# Patient Record
Sex: MALE | Race: ASIAN | NOT HISPANIC OR LATINO | Employment: OTHER | URBAN - METROPOLITAN AREA
[De-identification: names, ages, dates, MRNs, and addresses within clinical notes are randomized per-mention and may not be internally consistent; named-entity substitution may affect disease eponyms.]

---

## 2017-02-23 ENCOUNTER — SLEEP CENTER VISIT (OUTPATIENT)
Dept: SLEEP MEDICINE | Facility: MEDICAL CENTER | Age: 67
End: 2017-02-23
Payer: COMMERCIAL

## 2017-02-23 VITALS
WEIGHT: 175 LBS | RESPIRATION RATE: 16 BRPM | HEART RATE: 71 BPM | OXYGEN SATURATION: 91 % | HEIGHT: 69 IN | DIASTOLIC BLOOD PRESSURE: 82 MMHG | BODY MASS INDEX: 25.92 KG/M2 | SYSTOLIC BLOOD PRESSURE: 126 MMHG | TEMPERATURE: 97.7 F

## 2017-02-23 DIAGNOSIS — G47.33 OSA (OBSTRUCTIVE SLEEP APNEA): ICD-10-CM

## 2017-02-23 DIAGNOSIS — R06.02 SHORTNESS OF BREATH: ICD-10-CM

## 2017-02-23 PROCEDURE — 99213 OFFICE O/P EST LOW 20 MIN: CPT | Performed by: NURSE PRACTITIONER

## 2017-02-23 NOTE — MR AVS SNAPSHOT
"        Jamal Dee   2017 10:40 AM   Sleep Center Visit   MRN: [id number]    Department:  [facility]   Dept Phone:  [phone]    Description:  Male : 1950   Provider:  ERIK Devlin           Reason for Visit     Apnea           Allergies as of 2017     No Known Allergies      You were diagnosed with     DELMA (obstructive sleep apnea)   [966856]       Shortness of breath   [786.05.ICD-9-CM]         Vital Signs     Blood Pressure Pulse Temperature Respirations Height Weight    126/82 mmHg 71 36.5 °C (97.7 °F) 16 1.753 m (5' 9\") 79.379 kg (175 lb)    Body Mass Index Oxygen Saturation Smoking Status             25.83 kg/m2 91% Former Smoker         Basic Information     Date Of Birth Sex Race Ethnicity Preferred Language    1950 Male  Non- English      Problem List              ICD-10-CM Priority Class Noted - Resolved    DELMA (obstructive sleep apnea) G47.33   2016 - Present    Shortness of breath R06.02   2017 - Present      Health Maintenance        Date Due Completion Dates    IMM DTaP/Tdap/Td Vaccine (1 - Tdap) 1968 ---    COLONOSCOPY 1999 ---    IMM ZOSTER VACCINE 2009 ---    IMM PNEUMOCOCCAL 65+ (ADULT) LOW/MEDIUM RISK SERIES (1 of 2 - PCV13) 2014 ---    IMM INFLUENZA (1) 2016 ---            Current Immunizations     No immunizations on file.      Below and/or attached are the medications your provider expects you to take. Review all of your home medications and newly ordered medications with your provider and/or pharmacist. Follow medication instructions as directed by your provider and/or pharmacist. Please keep your medication list with you and share with your provider. Update the information when medications are discontinued, doses are changed, or new medications (including over-the-counter products) are added; and carry medication information at all times in the event of emergency situations     Allergies:  " No Known Allergies          Medications  Valid as of: February 23, 2017 - 10:58 AM    Generic Name Brand Name Tablet Size Instructions for use    Atorvastatin Calcium   Take  by mouth.        Bimatoprost (Solution) LUMIGAN 0.01 % Place 1 Drop in both eyes every bedtime. 1 DROP IN EACH EYE AT BED TIME.        Chlorhexidine Gluconate (Solution) PERIDEX 0.12 % RINSE 15ML'S AS DIRECTED AS NEEDED OR AFTER BREAKFAST/BEFORE BEDTIME FOLLOWING BRUSHING AND FLOSSING        Dutasteride (Cap) AVODART 0.5 MG Take 0.5 mg by mouth.        Dutasteride-Tamsulosin HCl (Cap) Dutasteride-Tamsulosin HCl 0.5-0.4 MG Take  by mouth every day. 1 TABLET DAILY        Fluticasone Propionate (Suspension) FLONASE 50 MCG/ACT USE 1 TO 2 SPRAYS IN EACH NOSTRIL ONCE DAILY        Fluvastatin Sodium (TABLET SR 24 HR) LESCOL XL 80 MG Take  by mouth every day.        PredniSONE (Tab) DELTASONE 10 MG TAKE 1 TABLET BY MOUTH EVERY MORNING FOR 2 DAYS        Zolpidem Tartrate (Tab) AMBIEN 5 MG Take 5 mg by mouth at bedtime as needed for Sleep. 1 to 3 po qhs prn. BRING TO SLEEP STUDY        .                 Medicines prescribed today were sent to:     [facility]Y #[id number] - [place], [place] - [address] [place] [address]    Phone: [phone] Fax: [phone]    Open 24 Hours?: No      Medication refill instructions:       If your prescription bottle indicates you have medication refills left, it is not necessary to call your provider’s office. Please contact your pharmacy and they will refill your medication.    If your prescription bottle indicates you do not have any refills left, you may request refills at any time through one of the following ways: The online [employer]on system (except Urgent Care), by calling your provider’s office, or by asking your pharmacy to contact your provider’s office with a refill request. Medication refills are processed only during regular business hours and may not be available  until the next business day. Your provider may request additional information or to have a follow-up visit with you prior to refilling your medication.   *Please Note: Medication refills are assigned a new Rx number when refilled electronically. Your pharmacy may indicate that no refills were authorized even though a new prescription for the same medication is available at the pharmacy. Please request the medicine by name with the pharmacy before contacting your provider for a refill.           QuarterSpot Access Code: [email or web address]  Expires: 3/25/2017 10:58 AM    QuarterSpot  A secure, online tool to manage your health information     [employer]’s QuarterSpot® is a secure, online tool that connects you to your personalized health information from the privacy of your home -- day or night - making it very easy for you to manage your healthcare. Once the activation process is completed, you can even access your medical information using the QuarterSpot gagan, which is available for free in the Apple Gagan store or Google Play store.     QuarterSpot provides the following levels of access (as shown below):   My Chart Features   [facility] Doctor [facility]  Specialists [facility]  Urgent  Care Non-[facility]  Primary Care  Doctor   Email your healthcare team securely and privately 24/7 X X X    Manage appointments: schedule your next appointment; view details of past/upcoming appointments X      Request prescription refills. X      View recent personal medical records, including lab and immunizations X X X X   View health record, including health history, allergies, medications X X X X   Read reports about your outpatient visits, procedures, consult and ER notes X X X X   See your discharge summary, which is a recap of your hospital and/or ER visit that includes your diagnosis, lab results, and care plan. X X       How to register for QuarterSpot:  1. Go to  [email or web address].  2. Click on the Sign Up Now box, which takes you to the  New Member Sign Up page. You will need to provide the following information:  a. Enter your Cabify Access Code exactly as it appears at the top of this page. (You will not need to use this code after you’ve completed the sign-up process. If you do not sign up before the expiration date, you must request a new code.)   b. Enter your date of birth.   c. Enter your home email address.   d. Click Submit, and follow the next screen’s instructions.  3. Create a Cabify ID. This will be your Cabify login ID and cannot be changed, so think of one that is secure and easy to remember.  4. Create a Cabify password. You can change your password at any time.  5. Enter your Password Reset Question and Answer. This can be used at a later time if you forget your password.   6. Enter your e-mail address. This allows you to receive e-mail notifications when new information is available in Cabify.  7. Click Sign Up. You can now view your health information.    For assistance activating your Cabify account, call [phone]

## 2017-02-23 NOTE — PROGRESS NOTES
Chief Complaint   Patient presents with   • Apnea       HPI:  Jamal Dee is a 66 y.o. year old male here today for follow-up on his obstructive sleep apnea. He has a seven-year history of excessive snoring, witnessed apneas, and daytime sleepiness. He underwent a dedicated in lab Polysomnogram on 5/2/2016 which indicates evidence of severe obstructive sleep apnea with an AHI of 55.6 and a low 02 saturation of 68%. He was successfully titrated to CPAP at 10 CM H20 with a resultant AHI of 2.3 and a low 02 saturation of 90%. He was started on a CPAP of 10 CM H20. He felt the pressures were too high on that setting and he requested a decrease. Compliance card download on CPAP of 10 CM H20 indicated an AHI of 4.6 with an average use of over 7 hours at night. He was empirically decreased to 8 CM H20, but continued to have tolerance issues. He was decreased to 6 CM at his last office visit. Repeat compliance card download today in the office indicates an AHI of 6.9 with an average use of 7 hours at night.   He had complaints of dyspnea at his last office visit. An echocardiogram was ordered. He did not have this performed as he is feeling much better. He denies dyspnea during the day. He denies current cough, mucous or wheeze. He denies any fevers or chills. He is using the dream wear mask which he feels is more comfortable. He tolerates the new pressure better. He is sleeping better and waking more refreshed. He denies daytime fatigue. He denies any morning headache.      Past Medical History   Diagnosis Date   • Hyperlipidemia    • Glaucoma        Past Surgical History   Procedure Laterality Date   • Cataract phaco with iol  5/2/2013     Performed by Theodore Guzman M.D. at [facility] ORS   • Cataract phaco with iol  5/16/2013     Performed by Theodore Guzman M.D. at [facility]       Family History   Problem Relation Age of Onset   • Heart Attack Father        Social History     Social  History   • Marital Status:      Spouse Name: N/A   • Number of Children: N/A   • Years of Education: N/A     Occupational History   • Not on file.     Social History Main Topics   • Smoking status: Former Smoker -- 1.50 packs/day   • Smokeless tobacco: Not on file      Comment: quit 1985   • Alcohol Use: 0.0 oz/week     0 Standard drinks or equivalent per week      Comment: wine   • Drug Use: Not on file   • Sexual Activity: Not on file     Other Topics Concern   • Not on file     Social History Narrative         ROS:  Constitutional: Denies fevers, chills, sweats, fatigue, weight loss  Eyes: Denies glasses, vision loss, pain, drainage, double vision  Ears/Nose/Mouth/Throat: Denies rhinitis, nasal congestion, ear ache, difficulty hearing, sore throat, persistent hoarseness, decayed teeth/toothache  Cardiovascular: Denies chest pain, tightness, palpitations, swelling in feet/legs, fainting, difficulty breathing when laying down  Respiratory: See HPI   GI: Denies heartburn, difficulty swallowing, nausea, vomiting, abdominal pain, diarrhea, constipation  : Denies frequent urination, painful urination  Integumentary: Denies rashes, lumps or color changes  MSK: Denies painful joints, sore muscles, and back pain.   Neurological: Denies frequent headaches, dizziness, weakness  Sleep: See HPI       Current Outpatient Prescriptions on File Prior to Visit   Medication Sig Dispense Refill   • dutasteride (AVODART) 0.5 MG capsule Take 0.5 mg by mouth.  0   • fluticasone (FLONASE) 50 MCG/ACT nasal spray USE 1 TO 2 SPRAYS IN EACH NOSTRIL ONCE DAILY  0   • predniSONE (DELTASONE) 10 MG Tab TAKE 1 TABLET BY MOUTH EVERY MORNING FOR 2 DAYS  0   • chlorhexidine (PERIDEX) 0.12 % Solution RINSE 15ML'S AS DIRECTED AS NEEDED OR AFTER BREAKFAST/BEFORE BEDTIME FOLLOWING BRUSHING AND FLOSSING  0   • Atorvastatin Calcium (LIPITOR PO) Take  by mouth.     • zolpidem (AMBIEN) 5 MG Tab Take 5 mg by mouth at bedtime as needed for Sleep. 1  "to 3 po qhs prn. BRING TO SLEEP STUDY     • bimatoprost (LUMIGAN) 0.01 % Solution Place 1 Drop in both eyes every bedtime. 1 DROP IN EACH EYE AT BED TIME.     • fluvastatin XL (LESCOL XL) 80 MG SR tablet Take  by mouth every day.     • Dutasteride-Tamsulosin HCl 0.5-0.4 MG Cap Take  by mouth every day. 1 TABLET DAILY       No current facility-administered medications on file prior to visit.     Review of patient's allergies indicates no known allergies.    Blood pressure 126/82, pulse 71, temperature 36.5 °C (97.7 °F), resp. rate 16, height 1.753 m (5' 9\"), weight 79.379 kg (175 lb), SpO2 91 %.  PE:   Appearance: Well developed, well nourished, no acute distress  Eyes: PERRL, EOM intact, sclera white, conjunctiva moist  Ears: no lesions or deformities  Hearing: grossly intact  Nose: no lesions or deformities  Oropharynx: tongue normal, posterior pharynx without erythema or exudate  Mallampati Classification: class 4  Neck: supple, trachea midline, no masses   Respiratory effort: no intercostal retractions or use of accessory muscles  Lung auscultation: no rales, rhonchi or wheezes  Heart auscultation: no murmur rub or gallop  Extremities: no cyanosis or edema  Abdomen: soft ,non tender, no masses  Gait and Station: normal  Digits and nails: no clubbing, cyanosis, petechiae or nodes.  Cranial nerves: grossly intact  Skin: no rashes, lesions or ulcers noted  Orientation: Oriented to time, person and place  Mood and affect: mood and affect appropriate, normal interaction with examiner  Judgement: Intact          Assessment:  1. DELMA (obstructive sleep apnea)           Plan:    1) Continue CPAP of 6 CM H20. He is tolerating the new pressure much better. He is sleeping better and waking more refreshed and his shortness of breath has resolved. D/C order for Echo.   2) Sleep hygiene discussed.   3) Annual follow up, sooner if needed.   "

## 2017-02-23 NOTE — PATIENT INSTRUCTIONS
Plan:    1) Continue CPAP of 6 CM H20. He is tolerating the new pressure much better. He is sleeping better and waking more refreshed and his shortness of breath has resolved. D/C order for Echo.   2) Sleep hygiene discussed.   3) Annual follow up, sooner if needed.

## 2017-03-27 ENCOUNTER — TELEPHONE (OUTPATIENT)
Dept: MEDICAL GROUP | Age: 67
End: 2017-03-27

## 2017-03-27 RX ORDER — ATORVASTATIN CALCIUM 40 MG/1
40 TABLET, FILM COATED ORAL
Refills: 0 | COMMUNITY
Start: 2017-03-13 | End: 2017-04-04 | Stop reason: SDUPTHER

## 2017-03-27 NOTE — Clinical Note
[facility]  Merari Juarez M.D.  [address] [place] [address]  Fax: [phone]   Authorization for Release/Disclosure of   Protected Health Information   Name: AYO DEE : 1950 SSN: [id number]   Address: [address]  [place] [address] Phone:    [phone] (home)    I authorize the entity listed below to release/disclose the PHI below to:   [facility]/ Maya Gage M.D.   Provider or Entity Name:  [facility]   Address   City, State, [facility]   Phone:      Fax:  [phone]     Reason for request: continuity of care   Information to be released:    [XX  ] LAST COLONOSCOPY,  including any PATH REPORT and follow-up  [  ] LAST FIT/COLOGUARD RESULT [  ] LAST DEXA  [  ] LAST MAMMOGRAM  [  ] LAST PAP  [  ] LAST LABS [  ] RETINA EXAM REPORT  [  ] IMMUNIZATION RECORDS  [  ] Release all info      [  ] Check here and initial the line next to each item to release ALL health information INCLUDING  _____ Care and treatment for drug and / or alcohol abuse  _____ HIV testing, infection status, or AIDS  _____ Genetic Testing    DATES OF SERVICE OR TIME PERIOD TO BE DISCLOSED: _____________  I understand and acknowledge that:  * This Authorization may be revoked at any time by you in writing, except if your health information has already been used or disclosed.  * Your health information that will be used or disclosed as a result of you signing this authorization could be re-disclosed by the recipient. If this occurs, your re-disclosed health information may no longer be protected by State or Federal laws.  * You may refuse to sign this Authorization. Your refusal will not affect your ability to obtain treatment.  * This Authorization becomes effective upon signing and will  on (date) __________.      If no date is indicated, this Authorization will  one (1) year from the signature date.    Name: Ayo Dee    Signature:   Date:     3/28/2017       PLEASE FAX REQUESTED RECORDS BACK  TO: [phone]

## 2017-03-27 NOTE — TELEPHONE ENCOUNTER
Called Jamal Dee and left a message to return my call regarding his/her upcoming NP appointment with Maya Gage M.D..   If patient returns the call please transfer them to extension: [phone]

## 2017-03-28 NOTE — TELEPHONE ENCOUNTER
NEW PATIENT VISIT PRE-VISIT PLANNING    1.  [employer] Patient is checked in Patient Demographics? YES    2.  Immunizations were updated in [facility] using WebIZ?: Yes       •  Web Iz Recommendations: FLU HEPATITIS A  HEPATITIS B TDAP    3.  Patient is due for the following Health Maintenance Topics:   Health Maintenance Due   Topic Date Due   • IMM DTaP/Tdap/Td Vaccine (1 - Tdap) 09/30/1968   • COLONOSCOPY  09/30/1999   • IMM PNEUMOCOCCAL 65+ (ADULT) LOW/MEDIUM RISK SERIES (1 of 2 - PCV13) 09/30/2014   • IMM INFLUENZA (1) 09/01/2016       - Patient has completed Colonoscopy/FIT and HMR Letter faxed to:  [facility] faxed on 3/28/17.    4.  Reviewed/Updated the following with patient:       •   Preferred Pharmacy? YES       •   Preferred Lab? YES       •   Medications? YES. Was Abstract Encounter opened and chart updated? YES       •   Social History? YES. Was Abstract Encounter opened and chart updated? YES       •   Family History? YES. Was Abstract Encounter opened and chart updated? YES    5.  Updated Care Team?       •   DME Company (gait device, O2, CPAP, etc.) N\A       •   Other Specialists (eye doctor, derm, GYN, cardiology, endo, etc): YES    6.  Patient was informed to arrive 15 min prior to their scheduled appointment and bring in their medication bottles? YES

## 2017-04-04 ENCOUNTER — OFFICE VISIT (OUTPATIENT)
Dept: MEDICAL GROUP | Age: 67
End: 2017-04-04
Payer: MEDICARE

## 2017-04-04 VITALS
TEMPERATURE: 97.3 F | HEART RATE: 75 BPM | SYSTOLIC BLOOD PRESSURE: 120 MMHG | DIASTOLIC BLOOD PRESSURE: 72 MMHG | WEIGHT: 180 LBS | HEIGHT: 67 IN | RESPIRATION RATE: 16 BRPM | OXYGEN SATURATION: 94 % | BODY MASS INDEX: 28.25 KG/M2

## 2017-04-04 DIAGNOSIS — R73.03 PREDIABETES: ICD-10-CM

## 2017-04-04 DIAGNOSIS — R33.8 BPH (BENIGN PROSTATIC HYPERTROPHY) WITH URINARY RETENTION: ICD-10-CM

## 2017-04-04 DIAGNOSIS — Z11.59 NEED FOR HEPATITIS C SCREENING TEST: ICD-10-CM

## 2017-04-04 DIAGNOSIS — E78.2 MIXED HYPERLIPIDEMIA: ICD-10-CM

## 2017-04-04 DIAGNOSIS — Z12.5 SCREENING FOR PROSTATE CANCER: ICD-10-CM

## 2017-04-04 DIAGNOSIS — Z11.59 NEED FOR HEPATITIS B SCREENING TEST: ICD-10-CM

## 2017-04-04 DIAGNOSIS — G47.33 OSA (OBSTRUCTIVE SLEEP APNEA): ICD-10-CM

## 2017-04-04 DIAGNOSIS — N40.1 BPH (BENIGN PROSTATIC HYPERTROPHY) WITH URINARY RETENTION: ICD-10-CM

## 2017-04-04 DIAGNOSIS — Z01.84 IMMUNITY STATUS TESTING: ICD-10-CM

## 2017-04-04 DIAGNOSIS — R73.01 IFG (IMPAIRED FASTING GLUCOSE): ICD-10-CM

## 2017-04-04 PROBLEM — R06.02 SHORTNESS OF BREATH: Status: RESOLVED | Noted: 2017-02-23 | Resolved: 2017-04-04

## 2017-04-04 PROCEDURE — 99204 OFFICE O/P NEW MOD 45 MIN: CPT | Performed by: INTERNAL MEDICINE

## 2017-04-04 RX ORDER — TAMSULOSIN HYDROCHLORIDE 0.4 MG/1
0.4 CAPSULE ORAL
Qty: 90 CAP | Refills: 3 | Status: SHIPPED | OUTPATIENT
Start: 2017-04-04 | End: 2018-03-27 | Stop reason: SDUPTHER

## 2017-04-04 RX ORDER — ATORVASTATIN CALCIUM 40 MG/1
40 TABLET, FILM COATED ORAL EVERY EVENING
Qty: 90 TAB | Refills: 3 | Status: SHIPPED | OUTPATIENT
Start: 2017-04-04 | End: 2018-03-27 | Stop reason: SDUPTHER

## 2017-04-04 RX ORDER — DUTASTERIDE 0.5 MG/1
0.5 CAPSULE, LIQUID FILLED ORAL DAILY
Qty: 90 CAP | Refills: 3 | Status: SHIPPED | OUTPATIENT
Start: 2017-04-04 | End: 2018-03-27 | Stop reason: SDUPTHER

## 2017-04-04 ASSESSMENT — PATIENT HEALTH QUESTIONNAIRE - PHQ9: CLINICAL INTERPRETATION OF PHQ2 SCORE: 0

## 2017-04-04 ASSESSMENT — PAIN SCALES - GENERAL: PAINLEVEL: NO PAIN

## 2017-04-04 NOTE — ASSESSMENT & PLAN NOTE
Patient has prediabetes with A1c 5.9-6. He reported that he started eating brown rice instead of white rice. He cut down carbohydrate and sweet. He does not exercise regularly.

## 2017-04-04 NOTE — MR AVS SNAPSHOT
"        Jamal Dee   2017 8:40 AM   Office Visit   MRN: [id number]    Department:  [address] [place] [address]   Dept Phone:  [phone]    Description:  Male : 1950   Provider:  Maya Gage M.D.           Reason for Visit     Establish Care Rx, blood test results    Hyperlipidemia     Benign Prostatic Hypertrophy     Other sleep apnea      Allergies as of 2017     Allergen Noted Reactions    Other Environmental 2017   Unspecified    Goose down      You were diagnosed with     Mixed hyperlipidemia   [272.2.ICD-9-CM]       BPH (benign prostatic hypertrophy) with urinary retention   [757619]       DELMA (obstructive sleep apnea)   [803835]       Screening for prostate cancer   [969657]       Need for hepatitis B screening test   [9856984]       Need for hepatitis C screening test   [825670]       Immunity status testing   [443776]       Prediabetes   [273913]         Vital Signs     Blood Pressure Pulse Temperature Respirations Height Weight    120/72 mmHg 75 36.3 °C (97.3 °F) 16 1.702 m (5' 7.01\") 81.647 kg (180 lb)    Body Mass Index Oxygen Saturation Smoking Status             28.19 kg/m2 94% Former Smoker         Basic Information     Date Of Birth Sex Race Ethnicity Preferred Language    1950 Male  Non- English      Your appointments     May 31, 2017  8:40 AM   Established Patient with Maya Gage M.D.   [address] [id number] [phone]           You will be receiving a confirmation call a few days before your appointment from our automated call confirmation system.              Problem List              ICD-10-CM Priority Class Noted - Resolved    DELMA (obstructive sleep apnea) G47.33   2016 - Present    Mixed hyperlipidemia E78.2   2017 - Present    BPH (benign prostatic hypertrophy) with urinary retention N40.1, R33.8   2017 - Present    Prediabetes R73.03   2017 - Present      "   Health Maintenance        Date Due Completion Dates    IMM DTaP/Tdap/Td Vaccine (1 - Tdap) 9/30/1968 ---    IMM PNEUMOCOCCAL 65+ (ADULT) LOW/MEDIUM RISK SERIES (1 of 2 - PCV13) 9/30/2014 ---    COLONOSCOPY 11/11/2018 11/11/2013            Current Immunizations     SHINGLES VACCINE 1/12/2015      Below and/or attached are the medications your provider expects you to take. Review all of your home medications and newly ordered medications with your provider and/or pharmacist. Follow medication instructions as directed by your provider and/or pharmacist. Please keep your medication list with you and share with your provider. Update the information when medications are discontinued, doses are changed, or new medications (including over-the-counter products) are added; and carry medication information at all times in the event of emergency situations     Allergies:  OTHER ENVIRONMENTAL - Unspecified               Medications  Valid as of: April 04, 2017 -  9:29 AM    Generic Name Brand Name Tablet Size Instructions for use    Atorvastatin Calcium (Tab) LIPITOR 40 MG Take 1 Tab by mouth every evening.        Bimatoprost (Solution) LUMIGAN 0.01 % Place 1 Drop in both eyes every bedtime. 1 DROP IN EACH EYE AT BED TIME.        Dutasteride (Cap) AVODART 0.5 MG Take 1 Cap by mouth every day.        Fluticasone Propionate (Suspension) FLONASE 50 MCG/ACT USE 1 TO 2 SPRAYS IN EACH NOSTRIL ONCE DAILY        Tamsulosin HCl (Cap) FLOMAX 0.4 MG Take 1 Cap by mouth ONE-HALF HOUR AFTER BREAKFAST.        .                 Medicines prescribed today were sent to:     [id number] - [place] - [address] [facility]    [address] [place] [address] [place] [address]    Phone: [phone] Fax: [phone]    Open 24 Hours?: No      Medication refill instructions:       If your prescription bottle indicates you have medication refills left, it is not necessary to call your provider’s office. Please contact your pharmacy and they will refill your  medication.    If your prescription bottle indicates you do not have any refills left, you may request refills at any time through one of the following ways: The online eucl3D system (except Urgent Care), by calling your provider’s office, or by asking your pharmacy to contact your provider’s office with a refill request. Medication refills are processed only during regular business hours and may not be available until the next business day. Your provider may request additional information or to have a follow-up visit with you prior to refilling your medication.   *Please Note: Medication refills are assigned a new Rx number when refilled electronically. Your pharmacy may indicate that no refills were authorized even though a new prescription for the same medication is available at the pharmacy. Please request the medicine by name with the pharmacy before contacting your provider for a refill.        Your To Do List     Future Labs/Procedures Complete By Expires    CBC WITH DIFFERENTIAL  As directed 4/5/2018    COMP METABOLIC PANEL  As directed 4/5/2018    HEP B CORE AB TOTAL  As directed 4/4/2018    HEP B SURFACE AB  As directed 4/5/2018    HEP C VIRUS ANTIBODY  As directed 4/5/2018    HEPATITIS A AB IGM+TOTAL  As directed 4/5/2018    HEPATITIS B SURFACE ANTIGEN  As directed 4/5/2018    LIPID PROFILE  As directed 4/5/2018    PROSTATE SPECIFIC AG SCREENING  As directed 4/5/2018         [email or web address] Access Code: [email or web address]  Expires: 4/26/2017  2:29 PM    eucl3D  A secure, online tool to manage your health information     [employer]’s eucl3D® is a secure, online tool that connects you to your personalized health information from the privacy of your home -- day or night - making it very easy for you to manage your healthcare. Once the activation process is completed, you can even access your medical information using the eucl3D gagan, which is available for free in the Apple Gagan store or Google Play store.      [employer] provides the following levels of access (as shown below):   My Chart Features   [facility] Doctor [facility]  Specialists [facility]  Urgent  Care Non-[facility]  Primary Care  Doctor   Email your healthcare team securely and privately 24/7 X X X    Manage appointments: schedule your next appointment; view details of past/upcoming appointments X      Request prescription refills. X      View recent personal medical records, including lab and immunizations X X X X   View health record, including health history, allergies, medications X X X X   Read reports about your outpatient visits, procedures, consult and ER notes X X X X   See your discharge summary, which is a recap of your hospital and/or ER visit that includes your diagnosis, lab results, and care plan. X X       How to register for [employer]:  1. Go to  [email or web address].  2. Click on the Sign Up Now box, which takes you to the New Member Sign Up page. You will need to provide the following information:  a. Enter your CelePost Access Code exactly as it appears at the top of this page. (You will not need to use this code after you’ve completed the sign-up process. If you do not sign up before the expiration date, you must request a new code.)   b. Enter your date of birth.   c. Enter your home email address.   d. Click Submit, and follow the next screen’s instructions.  3. Create a CelePost ID. This will be your CelePost login ID and cannot be changed, so think of one that is secure and easy to remember.  4. Create a CelePost password. You can change your password at any time.  5. Enter your Password Reset Question and Answer. This can be used at a later time if you forget your password.   6. Enter your e-mail address. This allows you to receive e-mail notifications when new information is available in CelePost.  7. Click Sign Up. You can now view your health information.    For assistance activating your [employer]Post account, call [phone]

## 2017-04-04 NOTE — ASSESSMENT & PLAN NOTE
He was diagnosed with sleep apnea in 2016. He started using CPAP 8 months ago. He follows with sleep medicine. He stated that he tolerates machine after adjusting the pressure.

## 2017-04-04 NOTE — ASSESSMENT & PLAN NOTE
Patient is taking atorvastatin 40 mg every evening. He denies side effects from taking atorvastatin. His last lipid panel showed that his cholesterol is well controlled except triglyceride which remains high.

## 2017-04-04 NOTE — ASSESSMENT & PLAN NOTE
Patient is taking dutasteride 0.5 mg every evening. He reported that his nocturia improves with medication but he still has urinary retention and frequency of urination in the daytime. We discussed to try Flomax 0.4 mg every morning after breakfast. He agreed with the plan.

## 2017-04-04 NOTE — PROGRESS NOTES
Jamal Dee is a 66 y.o. male here to establish care and the evaluation and management of:      HPI:    BPH (benign prostatic hypertrophy) with urinary retention  Patient is taking dutasteride 0.5 mg every evening. He reported that his nocturia improves with medication but he still has urinary retention and frequency of urination in the daytime. We discussed to try Flomax 0.4 mg every morning after breakfast. He agreed with the plan.    Mixed hyperlipidemia  Patient is taking atorvastatin 40 mg every evening. He denies side effects from taking atorvastatin. His last lipid panel showed that his cholesterol is well controlled except triglyceride which remains high.     DELMA (obstructive sleep apnea)  He was diagnosed with sleep apnea in 2016. He started using CPAP 8 months ago. He follows with sleep medicine. He stated that he tolerates machine after adjusting the pressure.    Prediabetes  Patient has prediabetes with A1c 5.9-6. He reported that he started eating brown rice instead of white rice. He cut down carbohydrate and sweet. He does not exercise regularly.    Current medicines (including changes today)  Current Outpatient Prescriptions   Medication Sig Dispense Refill   • atorvastatin (LIPITOR) 40 MG Tab Take 1 Tab by mouth every evening. 90 Tab 3   • dutasteride (AVODART) 0.5 MG capsule Take 1 Cap by mouth every day. 90 Cap 3   • tamsulosin (FLOMAX) 0.4 MG capsule Take 1 Cap by mouth ONE-HALF HOUR AFTER BREAKFAST. 90 Cap 3   • fluticasone (FLONASE) 50 MCG/ACT nasal spray USE 1 TO 2 SPRAYS IN EACH NOSTRIL ONCE DAILY  0   • bimatoprost (LUMIGAN) 0.01 % Solution Place 1 Drop in both eyes every bedtime. 1 DROP IN EACH EYE AT BED TIME.       No current facility-administered medications for this visit.     He  has a past medical history of Hyperlipidemia and Glaucoma.  He  has past surgical history that includes cataract phaco with iol (5/2/2013) and cataract phaco with iol (5/16/2013).  Social History  "  Substance Use Topics   • Smoking status: Former Smoker -- 1.50 packs/day for 15 years     Types: Cigarettes     Quit date: 1984   • Smokeless tobacco: Never Used      Comment: quit    • Alcohol Use: 4.2 oz/week     7 Glasses of wine, 0 Standard drinks or equivalent per week      Comment: 1 glass a night     Social History     Social History Narrative     Family History   Problem Relation Age of Onset   • Heart Attack Father    • Cancer Sister 62     colon cancer     Family Status   Relation Status Death Age   • Father     • Mother  95   • Sister     • Maternal Grandmother     • Maternal Grandfather     • Paternal Grandmother     • Paternal Grandfather       Health Maintenance Topics with due status: Overdue       Topic Date Due    IMM DTaP/Tdap/Td Vaccine 1968    IMM PNEUMOCOCCAL 65+ (ADULT) LOW/MEDIUM RISK SERIES 2014         ROS    Gen.: Denied weight change, appetite change, fatigue.  ENT: Denied sinus tenderness, nasal congestion, runny nose, or sore throat  CVS: Denied chest pain, palpitations, legs swelling.  Respiratory: Denied cough, shortness of breath, wheezing.  GI: Denied abdominal pain, constipation or diarrhea.  Endocrine: Denied temperature intolerance, increased frequency of urination, polyphagia or polydipsia.  Musculoskeletal: Denied back pain or joint pain.    All other systems reviewed and are negative     Objective:     Blood pressure 120/72, pulse 75, temperature 36.3 °C (97.3 °F), resp. rate 16, height 1.702 m (5' 7.01\"), weight 81.647 kg (180 lb), SpO2 94 %. Body mass index is 28.19 kg/(m^2).  Physical Exam:    Constitutional: Well nourished and Well developed, Alert, no distress.  Skin: Warm, dry, good turgor, no rashes in visible areas.  Eye: Equal, round and reactive, conjunctiva clear, lids normal.  ENMT: Lips without lesions, good dentition, oropharynx clear.  Neck: Trachea midline, no masses, no thyromegaly. " No cervical or supraclavicular lymphadenopathy.  Respiratory: Unlabored respiratory effort, lungs clear to auscultation, no wheezes, no ronchi.  Cardiovascular: Normal S1, S2, no murmur, no edema.   Abdomen: Soft, non distended, non-tender, no masses, no hepatosplenomegaly. Bowel sound normal.  Extremities: No edema, no clubbing, no cyanosis.  Psych: Alert and oriented x3, normal affect and mood.          Assessment and Plan:   The following treatment plan was discussed       1. Mixed hyperlipidemia  - Well-controlled except triglyceride is remaining high. Continue current regimens. Recheck lab 1-2 weeks before next follow up visit.  - Advised to eat low fat, low carbohydrate and high fiber diet as well as do cardio physical exercise regularly.   - atorvastatin (LIPITOR) 40 MG Tab; Take 1 Tab by mouth every evening.  Dispense: 90 Tab; Refill: 3  - COMP METABOLIC PANEL; Future  - LIPID PROFILE; Future    2. BPH (benign prostatic hypertrophy) with urinary retention  - Not well-controlled. Will add tamsulosin in the morning and continue dutasteride daily. Reviewed the side effects of medications with patient.  - dutasteride (AVODART) 0.5 MG capsule; Take 1 Cap by mouth every day.  Dispense: 90 Cap; Refill: 3  - tamsulosin (FLOMAX) 0.4 MG capsule; Take 1 Cap by mouth ONE-HALF HOUR AFTER BREAKFAST.  Dispense: 90 Cap; Refill: 3  - CBC WITH DIFFERENTIAL; Future  - COMP METABOLIC PANEL; Future    3. DELMA (obstructive sleep apnea)  - Well-controlled. Continue current regimens. Recheck lab 1-2 weeks before next follow up visit.  - CBC WITH DIFFERENTIAL; Future  - COMP METABOLIC PANEL; Future    4. Prediabetes  - Counseling for low carbohydrate diet and encouraged to do regular physical exercise.  - HEMOGLOBIN A1C; Future    5. Screening for prostate cancer  - Discussed pros and cons of PSA test as well as sensitivity and specificity of the test with patient. He would like to have early detection and request to order PSA  test.  - PROSTATE SPECIFIC AG SCREENING; Future    6. Need for hepatitis B screening test  - Patient does not know of his immunization status for hepatitis A and B. He wants to screening for hepatitis A, B, and C and also wants to know his immunization status.  - HEP B SURFACE AB; Future  - HEPATITIS B SURFACE ANTIGEN; Future  - HEP B CORE AB TOTAL; Future    7. Need for hepatitis C screening test  - He wants to screening for hepatitis A, B, and C and also wants to know his immunization status.  - HEP C VIRUS ANTIBODY; Future    8. Immunity status testing  - He wants to screening for hepatitis A, B, and C and also wants to know his immunization status.  - HEPATITIS A AB IGM+TOTAL; Future  - HEP B SURFACE AB; Future  - HEP B CORE AB TOTAL; Future    9. IFG (impaired fasting glucose)  - Fasting blood sugar improved with diet control.  - HEMOGLOBIN A1C; Future        Records requested.  Followup: Return in about 4 weeks (around 5/2/2017), or if symptoms worsen or fail to improve, for hyperlipidemia, DELMA, BPH, prediabetes, lab review. sooner should new symptoms or problems arise.      Please note that this dictation was created using voice recognition software. I have made every reasonable attempt to correct obvious errors, but I expect that there may have unintended errors in text, spelling, punctuation, or grammar that I did not discover.

## 2017-06-01 ENCOUNTER — HOSPITAL ENCOUNTER (OUTPATIENT)
Dept: LAB | Facility: MEDICAL CENTER | Age: 67
End: 2017-06-01
Attending: INTERNAL MEDICINE
Payer: MEDICARE

## 2017-06-01 DIAGNOSIS — E78.2 MIXED HYPERLIPIDEMIA: ICD-10-CM

## 2017-06-01 DIAGNOSIS — G47.33 OSA (OBSTRUCTIVE SLEEP APNEA): ICD-10-CM

## 2017-06-01 DIAGNOSIS — R73.01 IFG (IMPAIRED FASTING GLUCOSE): ICD-10-CM

## 2017-06-01 DIAGNOSIS — Z12.5 SCREENING FOR PROSTATE CANCER: ICD-10-CM

## 2017-06-01 DIAGNOSIS — R73.03 PREDIABETES: ICD-10-CM

## 2017-06-01 DIAGNOSIS — Z11.59 NEED FOR HEPATITIS C SCREENING TEST: ICD-10-CM

## 2017-06-01 DIAGNOSIS — N40.1 BPH (BENIGN PROSTATIC HYPERTROPHY) WITH URINARY RETENTION: ICD-10-CM

## 2017-06-01 DIAGNOSIS — Z11.59 NEED FOR HEPATITIS B SCREENING TEST: ICD-10-CM

## 2017-06-01 DIAGNOSIS — R33.8 BPH (BENIGN PROSTATIC HYPERTROPHY) WITH URINARY RETENTION: ICD-10-CM

## 2017-06-01 DIAGNOSIS — Z01.84 IMMUNITY STATUS TESTING: ICD-10-CM

## 2017-06-01 LAB
ALBUMIN SERPL BCP-MCNC: 4.1 G/DL (ref 3.2–4.9)
ALBUMIN/GLOB SERPL: 1.6 G/DL
ALP SERPL-CCNC: 65 U/L (ref 30–99)
ALT SERPL-CCNC: 19 U/L (ref 2–50)
ANION GAP SERPL CALC-SCNC: 6 MMOL/L (ref 0–11.9)
AST SERPL-CCNC: 19 U/L (ref 12–45)
BASOPHILS # BLD AUTO: 0.6 % (ref 0–1.8)
BASOPHILS # BLD: 0.03 K/UL (ref 0–0.12)
BILIRUB SERPL-MCNC: 0.6 MG/DL (ref 0.1–1.5)
BUN SERPL-MCNC: 18 MG/DL (ref 8–22)
CALCIUM SERPL-MCNC: 8.8 MG/DL (ref 8.5–10.5)
CHLORIDE SERPL-SCNC: 111 MMOL/L (ref 96–112)
CHOLEST SERPL-MCNC: 141 MG/DL (ref 100–199)
CO2 SERPL-SCNC: 26 MMOL/L (ref 20–33)
CREAT SERPL-MCNC: 0.92 MG/DL (ref 0.5–1.4)
EOSINOPHIL # BLD AUTO: 0.19 K/UL (ref 0–0.51)
EOSINOPHIL NFR BLD: 4.1 % (ref 0–6.9)
ERYTHROCYTE [DISTWIDTH] IN BLOOD BY AUTOMATED COUNT: 48.9 FL (ref 35.9–50)
EST. AVERAGE GLUCOSE BLD GHB EST-MCNC: 114 MG/DL
GFR SERPL CREATININE-BSD FRML MDRD: >60 ML/MIN/1.73 M 2
GLOBULIN SER CALC-MCNC: 2.6 G/DL (ref 1.9–3.5)
GLUCOSE SERPL-MCNC: 112 MG/DL (ref 65–99)
HBA1C MFR BLD: 5.6 % (ref 0–5.6)
HBV CORE AB SERPL QL IA: NEGATIVE
HBV SURFACE AB SERPL IA-ACNC: <3.1 MIU/ML (ref 0–10)
HCT VFR BLD AUTO: 48.9 % (ref 42–52)
HCV AB SER QL: NEGATIVE
HDLC SERPL-MCNC: 45 MG/DL
HGB BLD-MCNC: 15.7 G/DL (ref 14–18)
IMM GRANULOCYTES # BLD AUTO: 0.01 K/UL (ref 0–0.11)
IMM GRANULOCYTES NFR BLD AUTO: 0.2 % (ref 0–0.9)
LDLC SERPL CALC-MCNC: 67 MG/DL
LYMPHOCYTES # BLD AUTO: 1.15 K/UL (ref 1–4.8)
LYMPHOCYTES NFR BLD: 24.6 % (ref 22–41)
MCH RBC QN AUTO: 30.3 PG (ref 27–33)
MCHC RBC AUTO-ENTMCNC: 32.1 G/DL (ref 33.7–35.3)
MCV RBC AUTO: 94.4 FL (ref 81.4–97.8)
MONOCYTES # BLD AUTO: 0.34 K/UL (ref 0–0.85)
MONOCYTES NFR BLD AUTO: 7.3 % (ref 0–13.4)
NEUTROPHILS # BLD AUTO: 2.96 K/UL (ref 1.82–7.42)
NEUTROPHILS NFR BLD: 63.2 % (ref 44–72)
NRBC # BLD AUTO: 0 K/UL
NRBC BLD AUTO-RTO: 0 /100 WBC
PLATELET # BLD AUTO: 197 K/UL (ref 164–446)
PMV BLD AUTO: 10 FL (ref 9–12.9)
POTASSIUM SERPL-SCNC: 4 MMOL/L (ref 3.6–5.5)
PROT SERPL-MCNC: 6.7 G/DL (ref 6–8.2)
PSA SERPL-MCNC: 0.52 NG/ML (ref 0–4)
RBC # BLD AUTO: 5.18 M/UL (ref 4.7–6.1)
SODIUM SERPL-SCNC: 143 MMOL/L (ref 135–145)
TRIGL SERPL-MCNC: 147 MG/DL (ref 0–149)
WBC # BLD AUTO: 4.7 K/UL (ref 4.8–10.8)

## 2017-06-01 PROCEDURE — 86708 HEPATITIS A ANTIBODY: CPT

## 2017-06-01 PROCEDURE — 84153 ASSAY OF PSA TOTAL: CPT

## 2017-06-01 PROCEDURE — 86803 HEPATITIS C AB TEST: CPT

## 2017-06-01 PROCEDURE — 80061 LIPID PANEL: CPT

## 2017-06-01 PROCEDURE — 85025 COMPLETE CBC W/AUTO DIFF WBC: CPT

## 2017-06-01 PROCEDURE — 86706 HEP B SURFACE ANTIBODY: CPT

## 2017-06-01 PROCEDURE — 86704 HEP B CORE ANTIBODY TOTAL: CPT

## 2017-06-01 PROCEDURE — 36415 COLL VENOUS BLD VENIPUNCTURE: CPT

## 2017-06-01 PROCEDURE — 86709 HEPATITIS A IGM ANTIBODY: CPT

## 2017-06-01 PROCEDURE — 80053 COMPREHEN METABOLIC PANEL: CPT

## 2017-06-01 PROCEDURE — 83036 HEMOGLOBIN GLYCOSYLATED A1C: CPT

## 2017-06-02 LAB
HAV AB SER QL IA: POSITIVE
HAV IGM SERPL QL IA: NEGATIVE

## 2017-06-06 ENCOUNTER — OFFICE VISIT (OUTPATIENT)
Dept: MEDICAL GROUP | Age: 67
End: 2017-06-06
Payer: MEDICARE

## 2017-06-06 VITALS
HEART RATE: 85 BPM | OXYGEN SATURATION: 94 % | DIASTOLIC BLOOD PRESSURE: 72 MMHG | WEIGHT: 178 LBS | TEMPERATURE: 97.6 F | HEIGHT: 67 IN | BODY MASS INDEX: 27.94 KG/M2 | SYSTOLIC BLOOD PRESSURE: 120 MMHG

## 2017-06-06 DIAGNOSIS — Z23 NEED FOR PNEUMOCOCCAL VACCINATION: ICD-10-CM

## 2017-06-06 DIAGNOSIS — R73.03 PREDIABETES: ICD-10-CM

## 2017-06-06 DIAGNOSIS — E78.2 MIXED HYPERLIPIDEMIA: ICD-10-CM

## 2017-06-06 DIAGNOSIS — N40.1 BPH (BENIGN PROSTATIC HYPERTROPHY) WITH URINARY RETENTION: ICD-10-CM

## 2017-06-06 DIAGNOSIS — R33.8 BPH (BENIGN PROSTATIC HYPERTROPHY) WITH URINARY RETENTION: ICD-10-CM

## 2017-06-06 PROCEDURE — 1101F PT FALLS ASSESS-DOCD LE1/YR: CPT | Performed by: INTERNAL MEDICINE

## 2017-06-06 PROCEDURE — 99214 OFFICE O/P EST MOD 30 MIN: CPT | Mod: 25 | Performed by: INTERNAL MEDICINE

## 2017-06-06 PROCEDURE — G8432 DEP SCR NOT DOC, RNG: HCPCS | Performed by: INTERNAL MEDICINE

## 2017-06-06 PROCEDURE — 3017F COLORECTAL CA SCREEN DOC REV: CPT | Performed by: INTERNAL MEDICINE

## 2017-06-06 PROCEDURE — G8419 CALC BMI OUT NRM PARAM NOF/U: HCPCS | Performed by: INTERNAL MEDICINE

## 2017-06-06 PROCEDURE — 90670 PCV13 VACCINE IM: CPT | Performed by: INTERNAL MEDICINE

## 2017-06-06 PROCEDURE — G0009 ADMIN PNEUMOCOCCAL VACCINE: HCPCS | Performed by: INTERNAL MEDICINE

## 2017-06-06 PROCEDURE — 4040F PNEUMOC VAC/ADMIN/RCVD: CPT | Performed by: INTERNAL MEDICINE

## 2017-06-06 PROCEDURE — 1036F TOBACCO NON-USER: CPT | Performed by: INTERNAL MEDICINE

## 2017-06-06 RX ORDER — TIMOLOL MALEATE 5 MG/ML
1 SOLUTION/ DROPS OPHTHALMIC EVERY MORNING
Refills: 6 | COMMUNITY
Start: 2017-05-18

## 2017-06-06 ASSESSMENT — PAIN SCALES - GENERAL: PAINLEVEL: NO PAIN

## 2017-06-06 NOTE — PROGRESS NOTES
Subjective:   Jamal Dee is a 66 y.o. male here today for evaluation and management of:      Prediabetes  Patient still has elevated fasting blood sugar at 112 on 6/1/17. A1c improved to 5.6 on 6/1/17. He denied polyuria, polydipsia, extreme fatigue, weight loss. He likes to eat carbohydrate and drinks one or 2 glasses of wine every evening. He will cut down the carbohydrate and cut down the wine intake. He does not have regular cardio exercise, but he plays golf and summertime.    Mixed hyperlipidemia  He is taking Lipitor 40 mg every evening. He denies side effects from taking Lipitor. His recent chemistry panel showed normal liver enzymes. His cholesterol level is stable and well controlled. I reviewed recent blood test with patient today.    Results for JAMAL DEE (MRN [id number]) as of 6/6/2017 13:53   Ref. Range 6/1/2017 08:26   Cholesterol,Tot Latest Ref Range: 100-199 mg/dL 141   Triglycerides Latest Ref Range: 0-149 mg/dL 147   HDL Latest Ref Range: >=40 mg/dL 45   LDL Latest Ref Range: <100 mg/dL 67       BPH (benign prostatic hypertrophy) with urinary retention  Patient is taking Flomax 0.4 mg daily and dutasteride 0.5 mg every evening. He sometimes forgets to take Flomax. Patient reported that his urinary symptoms improved and well-controlled with current regimens and he does not have any adverse effects from taking Flomax and dutasteride. Recent PSA was 0.52 on 6/1/17.         Current medicines (including changes today)  Current Outpatient Prescriptions   Medication Sig Dispense Refill   • timolol (TIMOPTIC) 0.5 % Solution INSTILL 1 DROP INTO RIGHT EYE EVERY MORNING  6   • atorvastatin (LIPITOR) 40 MG Tab Take 1 Tab by mouth every evening. 90 Tab 3   • dutasteride (AVODART) 0.5 MG capsule Take 1 Cap by mouth every day. 90 Cap 3   • tamsulosin (FLOMAX) 0.4 MG capsule Take 1 Cap by mouth ONE-HALF HOUR AFTER BREAKFAST. 90 Cap 3   • fluticasone (FLONASE) 50 MCG/ACT nasal spray USE 1 TO 2  "SPRAYS IN EACH NOSTRIL ONCE DAILY  0   • bimatoprost (LUMIGAN) 0.01 % Solution Place 1 Drop in both eyes every bedtime. 1 DROP IN EACH EYE AT BED TIME.       No current facility-administered medications for this visit.     He  has a past medical history of Hyperlipidemia and Glaucoma.    ROS   No chest pain, no shortness of breath, no abdominal pain       Objective:     Blood pressure 120/72, pulse 85, temperature 36.4 °C (97.6 °F), height 1.702 m (5' 7.01\"), weight 80.74 kg (178 lb), SpO2 94 %. Body mass index is 27.87 kg/(m^2).   Physical Exam:  General: Alert, oriented and no acute distress.  Eye contact is good, speech goal directed, affect calm  HEENT: conjunctiva non-injected, sclera non-icteric.  Oral mucous membranes pink and moist with no lesions.  Pinna normal.   Lungs: Normal respiratory effort, clear to auscultation bilaterally with good excursion.  CV: regular rate and rhythm. No murmurs.   Abdomen: soft, non distended, nontender, Bowel sound normal.  Ext: no edema, color normal, vascularity normal, temperature normal      Assessment and Plan:   The following treatment plan was discussed     1. Prediabetes  - Patient will continue to control with diet and exercise for his blood sugar. He will cut down wine intake as well.    2. Mixed hyperlipidemia  - Well-controlled. Continue current regimens. Recheck lab 1-2 weeks before next follow up visit.  - Advised to eat low fat, low carbohydrate and high fiber diet as well as do cardio physical exercise regularly.     3. BPH (benign prostatic hypertrophy) with urinary retention  - Well-controlled. Continue current regimens. Recheck lab 1-2 weeks before next follow up visit.    4. Need for pneumococcal vaccination  - Prevnar 13 vaccine was given today after reviewing risks and benefits as well as side effects of vaccine.  - PNEUMOCOCCAL CONJUGATE VACCINE 13-VALENT    5. Health Maintenance  Patient has negative for hepatitis B screening test and he also has not " had immunization to Hepatitis B. He wants hepatitis B vaccine. He will confirm with his insurance regarding the coverage of hepatitis B vaccines.        Followup: Return in about 3 months (around 9/6/2017), or if symptoms worsen or fail to improve, for annual wellness visit.      Please note that this dictation was created using voice recognition software. I have made every reasonable attempt to correct obvious errors, but I expect that there may have unintended errors in text, spelling, punctuation, or grammar that I did not discover.

## 2017-06-06 NOTE — ASSESSMENT & PLAN NOTE
Patient still has elevated fasting blood sugar at 112 on 6/1/17. A1c improved to 5.6 on 6/1/17. He denied polyuria, polydipsia, extreme fatigue, weight loss. He likes to eat carbohydrate and drinks one or 2 glasses of wine every evening. He will cut down the carbohydrate and cut down the wine intake. He does not have regular cardio exercise, but he plays golf and summertime.

## 2017-06-06 NOTE — ASSESSMENT & PLAN NOTE
Patient is taking Flomax 0.4 mg daily and dutasteride 0.5 mg every evening. He sometimes forgets to take Flomax. Patient reported that his urinary symptoms improved and well-controlled with current regimens and he does not have any adverse effects from taking Flomax and dutasteride. Recent PSA was 0.52 on 6/1/17.

## 2017-06-06 NOTE — ASSESSMENT & PLAN NOTE
He is taking Lipitor 40 mg every evening. He denies side effects from taking Lipitor. His recent chemistry panel showed normal liver enzymes. His cholesterol level is stable and well controlled. I reviewed recent blood test with patient today.    Results for AYO HICKS (MRN [id number]) as of 6/6/2017 13:53   Ref. Range 6/1/2017 08:26   Cholesterol,Tot Latest Ref Range: 100-199 mg/dL 141   Triglycerides Latest Ref Range: 0-149 mg/dL 147   HDL Latest Ref Range: >=40 mg/dL 45   LDL Latest Ref Range: <100 mg/dL 67

## 2017-07-02 ENCOUNTER — HOSPITAL ENCOUNTER (EMERGENCY)
Facility: MEDICAL CENTER | Age: 67
End: 2017-07-02
Attending: EMERGENCY MEDICINE
Payer: MEDICARE

## 2017-07-02 VITALS
TEMPERATURE: 97.8 F | WEIGHT: 178.57 LBS | HEART RATE: 81 BPM | OXYGEN SATURATION: 93 % | HEIGHT: 67 IN | SYSTOLIC BLOOD PRESSURE: 96 MMHG | BODY MASS INDEX: 28.03 KG/M2 | RESPIRATION RATE: 18 BRPM | DIASTOLIC BLOOD PRESSURE: 57 MMHG

## 2017-07-02 DIAGNOSIS — S01.111A LACERATION OF EYEBROW, RIGHT, INITIAL ENCOUNTER: ICD-10-CM

## 2017-07-02 PROCEDURE — 304999 HCHG REPAIR-SIMPLE/INTERMED LEVEL 1

## 2017-07-02 PROCEDURE — 99283 EMERGENCY DEPT VISIT LOW MDM: CPT

## 2017-07-02 PROCEDURE — 304217 HCHG IRRIGATION SYSTEM

## 2017-07-02 PROCEDURE — 303747 HCHG EXTRA SUTURE

## 2017-07-02 ASSESSMENT — PAIN SCALES - GENERAL: PAINLEVEL_OUTOF10: 3

## 2017-07-02 NOTE — ED AVS SNAPSHOT
testhub Access Code: [email or web address]  Expires: 8/1/2017  8:09 PM    testhub  A secure, online tool to manage your health information     [employer]’s testhub® is a secure, online tool that connects you to your personalized health information from the privacy of your home -- day or night - making it very easy for you to manage your healthcare. Once the activation process is completed, you can even access your medical information using the testhub gagan, which is available for free in the Apple Gagan store or Google Play store.     testhub provides the following levels of access (as shown below):   My Chart Features   [facility] Primary Care Doctor [facility]  Specialists [facility]  Urgent  Care Non-[facility]  Primary Care  Doctor   Email your healthcare team securely and privately 24/7 X X X X   Manage appointments: schedule your next appointment; view details of past/upcoming appointments X      Request prescription refills. X      View recent personal medical records, including lab and immunizations X X X X   View health record, including health history, allergies, medications X X X X   Read reports about your outpatient visits, procedures, consult and ER notes X X X X   See your discharge summary, which is a recap of your hospital and/or ER visit that includes your diagnosis, lab results, and care plan. X X       How to register for testhub:  1. Go to  [email or web address].  2. Click on the Sign Up Now box, which takes you to the New Member Sign Up page. You will need to provide the following information:  a. Enter your testhub Access Code exactly as it appears at the top of this page. (You will not need to use this code after you’ve completed the sign-up process. If you do not sign up before the expiration date, you must request a new code.)   b. Enter your date of birth.   c. Enter your home email address.   d. Click Submit, and follow the next screen’s instructions.  3. Create a testhub ID. This will be your testhub  login ID and cannot be changed, so think of one that is secure and easy to remember.  4. Create a [employer] password. You can change your password at any time.  5. Enter your Password Reset Question and Answer. This can be used at a later time if you forget your password.   6. Enter your e-mail address. This allows you to receive e-mail notifications when new information is available in Streamix.  7. Click Sign Up. You can now view your health information.    For assistance activating your [employer] account, call [phone]

## 2017-07-02 NOTE — ED AVS SNAPSHOT
Home Care Instructions                                                                                                                Jamal Dee   MRN: [id number]    Department:  [facility], Emergency Dept   Date of Visit:  7/2/2017            [facility], Emergency Dept    [address]    [place] [address]    Phone:  [phone]      You were seen by     Chad Barba M.D.      Your Diagnosis Was     Laceration of eyebrow, right, initial encounter     S01.111A       Follow-up Information     1. Follow up with [facility], Emergency Dept.    Specialty:  Emergency Medicine    Why:  sutures out in 5 days or with your PCP    Contact information    [address] [id number] [phone]      Medication Information     Review all of your home medications and newly ordered medications with your primary doctor and/or pharmacist as soon as possible. Follow medication instructions as directed by your doctor and/or pharmacist.     Please keep your complete medication list with you and share with your physician. Update the information when medications are discontinued, doses are changed, or new medications (including over-the-counter products) are added; and carry medication information at all times in the event of emergency situations.               Medication List      ASK your doctor about these medications        Instructions    Morning Afternoon Evening Bedtime    atorvastatin 40 MG Tabs   Commonly known as:  LIPITOR        Take 1 Tab by mouth every evening.   Dose:  40 mg                        bimatoprost 0.01 % Soln   Commonly known as:  LUMIGAN        Place 1 Drop in both eyes every bedtime. 1 DROP IN EACH EYE AT BED TIME.   Dose:  1 Drop                        dutasteride 0.5 MG capsule   Commonly known as:  AVODART        Take 1 Cap by mouth every day.   Dose:  0.5 mg                        fluticasone 50  MCG/ACT nasal spray   Commonly known as:  FLONASE        USE 1 TO 2 SPRAYS IN EACH NOSTRIL ONCE DAILY                        tamsulosin 0.4 MG capsule   Commonly known as:  FLOMAX        Take 1 Cap by mouth ONE-HALF HOUR AFTER BREAKFAST.   Dose:  0.4 mg                        timolol 0.5 % Soln   Commonly known as:  TIMOPTIC        INSTILL 1 DROP INTO RIGHT EYE EVERY MORNING                                  Discharge Instructions       Laceration Care, Adult  A laceration is a cut that goes through all of the layers of the skin and into the tissue that is right under the skin. Some lacerations heal on their own. Others need to be closed with stitches (sutures), staples, skin adhesive strips, or skin glue. Proper laceration care minimizes the risk of infection and helps the laceration to heal better.  HOW TO CARE FOR YOUR LACERATION  If sutures or staples were used:  · Keep the wound clean and dry.  · If you were given a bandage (dressing), you should change it at least one time per day or as told by your health care provider. You should also change it if it becomes wet or dirty.  · Keep the wound completely dry for the first 24 hours or as told by your health care provider. After that time, you may shower or bathe. However, make sure that the wound is not soaked in water until after the sutures or staples have been removed.  · Clean the wound one time each day or as told by your health care provider:  ¨ Wash the wound with soap and water.  ¨ Rinse the wound with water to remove all soap.  ¨ Pat the wound dry with a clean towel. Do not rub the wound.  · After cleaning the wound, apply a thin layer of antibiotic ointment as told by your health care provider. This will help to prevent infection and keep the dressing from sticking to the wound.  · Have the sutures or staples removed as told by your health care provider.  If skin adhesive strips were used:  · Keep the wound clean and dry.  · If you were given a bandage  (dressing), you should change it at least one time per day or as told by your health care provider. You should also change it if it becomes dirty or wet.  · Do not get the skin adhesive strips wet. You may shower or bathe, but be careful to keep the wound dry.  · If the wound gets wet, pat it dry with a clean towel. Do not rub the wound.  · Skin adhesive strips fall off on their own. You may trim the strips as the wound heals. Do not remove skin adhesive strips that are still stuck to the wound. They will fall off in time.  If skin glue was used:  · Try to keep the wound dry, but you may briefly wet it in the shower or bath. Do not soak the wound in water, such as by swimming.  · After you have showered or bathed, gently pat the wound dry with a clean towel. Do not rub the wound.  · Do not do any activities that will make you sweat heavily until the skin glue has fallen off on its own.  · Do not apply liquid, cream, or ointment medicine to the wound while the skin glue is in place. Using those may loosen the film before the wound has healed.  · If you were given a bandage (dressing), you should change it at least one time per day or as told by your health care provider. You should also change it if it becomes dirty or wet.  · If a dressing is placed over the wound, be careful not to apply tape directly over the skin glue. Doing that may cause the glue to be pulled off before the wound has healed.  · Do not pick at the glue. The skin glue usually remains in place for 5-10 days, then it falls off of the skin.  General Instructions  · Take over-the-counter and prescription medicines only as told by your health care provider.  · If you were prescribed an antibiotic medicine or ointment, take or apply it as told by your doctor. Do not stop using it even if your condition improves.  · To help prevent scarring, make sure to cover your wound with sunscreen whenever you are outside after stitches are removed, after adhesive  strips are removed, or when glue remains in place and the wound is healed. Make sure to wear a sunscreen of at least 30 SPF.  · Do not scratch or pick at the wound.  · Keep all follow-up visits as told by your health care provider. This is important.  · Check your wound every day for signs of infection. Watch for:  ¨ Redness, swelling, or pain.  ¨ Fluid, blood, or pus.  · Raise (elevate) the injured area above the level of your heart while you are sitting or lying down, if possible.  SEEK MEDICAL CARE IF:  · You received a tetanus shot and you have swelling, severe pain, redness, or bleeding at the injection site.  · You have a fever.  · A wound that was closed breaks open.  · You notice a bad smell coming from your wound or your dressing.  · You notice something coming out of the wound, such as wood or glass.  · Your pain is not controlled with medicine.  · You have increased redness, swelling, or pain at the site of your wound.  · You have fluid, blood, or pus coming from your wound.  · You notice a change in the color of your skin near your wound.  · You need to change the dressing frequently due to fluid, blood, or pus draining from the wound.  · You develop a new rash.  · You develop numbness around the wound.  SEEK IMMEDIATE MEDICAL CARE IF:  · You develop severe swelling around the wound.  · Your pain suddenly increases and is severe.  · You develop painful lumps near the wound or on skin that is anywhere on your body.  · You have a red streak going away from your wound.  · The wound is on your hand or foot and you cannot properly move a finger or toe.  · The wound is on your hand or foot and you notice that your fingers or toes look pale or bluish.     This information is not intended to replace advice given to you by your health care provider. Make sure you discuss any questions you have with your health care provider.     Document Released: 12/18/2006 Document Revised: 05/03/2016 Document Reviewed:  12/14/2015  Elsevier Interactive Patient Education ©2016 Elsevier Inc.            Patient Information     Patient Information    Following emergency treatment: all patient requiring follow-up care must return either to a private physician or a clinic if your condition worsens before you are able to obtain further medical attention, please return to the emergency room.     Billing Information    At [facility], we work to make the billing process streamlined for our patients.  Our Representatives are here to answer any questions you may have regarding your hospital bill.  If you have insurance coverage and have supplied your insurance information to us, we will submit a claim to your insurer on your behalf.  Should you have any questions regarding your bill, we can be reached online or by phone as follows:  Online: You are able pay your bills online or live chat with our representatives about any billing questions you may have. We are here to help Monday - Friday from 8:00am to 7:30pm and 9:00am - 12:00pm on Saturdays.  Please visit [email or web address]  for more information.   Phone:  [phone] or [phone]    Please note that your emergency physician, surgeon, pathologist, radiologist, anesthesiologist, and other specialists are not employed by [facility] and will therefore bill separately for their services.  Please contact them directly for any questions concerning their bills at the numbers below:     Emergency Physician Services:  [phone]  [facility]:  [phone]  [facility]:  [phone]  [facility]:  [phone]    1. Your final bill may vary from the amount quoted upon discharge if all procedures are not complete at that time, or if your doctor has additional procedures of which we are not aware. You will receive an additional bill if you return to the Emergency Department at [facility] for suture removal  regardless of the facility of which the sutures were placed.     2. Please arrange for settlement of this account at the emergency registration.    3. All self-pay accounts are due in full at the time of treatment.  If you are unable to meet this obligation then payment is expected within 4-5 days.     4. If you have had radiology studies (CT, X-ray, Ultrasound, MRI), you have received a preliminary result during your emergency department visit. Please contact the radiology department [phone] to receive a copy of your final result. Please discuss the Final result with your primary physician or with the follow up physician provided.     Crisis Hotline:  [place] Crisis Hotline:  2-416-RIPQYDC or 1-636.156.9232  [place] Crisis Hotline:    1-428.783.7841 or 075-853-8870         ED Discharge Follow Up Questions    1. In order to provide you with very good care, we would like to follow up with a phone call in the next few days.  May we have your permission to contact you?     YES /  NO    2. What is the best phone number to call you? (       )_____-__________    3. What is the best time to call you?      Morning  /  Afternoon  /  Evening                   Patient Signature:  ____________________________________________________________    Date:  ____________________________________________________________      Your appointments     Sep 06, 2017  8:20 AM   ANNUAL WELLNESS with Maya Gage M.D., [facility]    [address])    [address] [place] [id number] [phone]

## 2017-07-02 NOTE — ED AVS SNAPSHOT
7/2/2017    Jamal Dee  [address] [place] [address]    Dear Jamal:    [facility] wants to ensure your discharge home is safe and you or your loved ones have had all of your questions answered regarding your care after you leave the hospital.    Below is a list of resources and contact information should you have any questions regarding your hospital stay, follow-up instructions, or active medical symptoms.    Questions or Concerns Regarding… Contact   Medical Questions Related to Your Discharge  (7 days a week, 8am-5pm) Contact a Nurse Care Coordinator   [phone]   Medical Questions Not Related to Your Discharge  (24 hours a day / 7 days a week)  Contact the Nurse Health Line   [phone]    Medications or Discharge Instructions Refer to your discharge packet   or contact your [facility] Primary Care Provider   [phone]   Follow-up Appointment(s) Schedule your appointment via [employer]   or contact Scheduling [phone]   Billing Review your statement via [employer]  or contact Billing [phone]   Medical Records Review your records via [employer]   or contact Medical Records [phone]     You may receive a telephone call within two days of discharge. This call is to make certain you understand your discharge instructions and have the opportunity to have any questions answered. You can also easily access your medical information, test results and upcoming appointments via the [employer] free online health management tool. You can learn more and sign up at [employer]. For assistance setting up your [employer] account, please call [phone].    Once again, we want to ensure your discharge home is safe and that you have a clear understanding of any next steps in your care. If you have any questions or concerns, please do not hesitate to contact us, we are here for you. Thank you for choosing [facility] for your healthcare needs.    Sincerely,    Your [facility] Healthcare Team

## 2017-07-03 NOTE — ED NOTES
ERP at bedside. Pt agrees with plan of care discussed by ERP. AIDET acknowledged with patient. Cj in low position, side rail up for pt safety. Call light within reach. Will continue to monitor.

## 2017-07-03 NOTE — ED NOTES
Dressing applied per ERP orders. Patient instructed for follow up in 5 days for suture removal. Discharge instructions provided.  Pt verbalized the understanding of discharge instructions to follow up with PCP and to return to ER if condition worsens.  Pt ambulated out of ER without difficulty.

## 2017-07-03 NOTE — ED PROVIDER NOTES
ED Provider Note    CHIEF COMPLAINT  Chief Complaint   Patient presents with   • Laceration       HPI  Jamal Dee is a 66 y.o. male who presents after suffering a laceration over his right eyebrow. The patient says he had finished golfing, was getting his his car, when he struck his head against the door. He suffered a laceration over the right eyebrow. He did have some bleeding initially, which resolved after applying pressure and placed a Band-Aid. The patient denies any other injuries. He denies any headache, visual changes, facial pain, or neck pain.    REVIEW OF SYSTEMS  See HPI for further details.      PAST MEDICAL HISTORY  Past Medical History   Diagnosis Date   • Hyperlipidemia    • Glaucoma        FAMILY HISTORY  Family History   Problem Relation Age of Onset   • Heart Attack Father    • Cancer Sister 62     colon cancer       SOCIAL HISTORY  Social History     Social History   • Marital Status:      Spouse Name: N/A   • Number of Children: N/A   • Years of Education: N/A     Social History Main Topics   • Smoking status: Former Smoker -- 1.50 packs/day for 15 years     Types: Cigarettes     Quit date: 01/28/1984   • Smokeless tobacco: Never Used      Comment: quit 1985   • Alcohol Use: 4.2 oz/week     7 Glasses of wine, 0 Standard drinks or equivalent per week      Comment: 1 glass a night   • Drug Use: No   • Sexual Activity:     Partners: Female     Other Topics Concern   • None     Social History Narrative       SURGICAL HISTORY  Past Surgical History   Procedure Laterality Date   • Cataract phaco with iol  5/2/2013     Performed by Theodore Guzman M.D. at [facility]   • Cataract phaco with iol  5/16/2013     Performed by Theodore Guzman M.D. at [facility]       CURRENT MEDICATIONS  Home Medications     Reviewed by Stalin Noland R.N. (Registered Nurse) on 07/02/17 at 1940  Med List Status: Complete    Medication Last Dose Status    atorvastatin (LIPITOR) 40  "MG Tab 7/1/2017 Active    bimatoprost (LUMIGAN) 0.01 % Solution 7/1/2017 Active    dutasteride (AVODART) 0.5 MG capsule 7/1/2017 Active    fluticasone (FLONASE) 50 MCG/ACT nasal spray  Active    tamsulosin (FLOMAX) 0.4 MG capsule 7/1/2017 Active    timolol (TIMOPTIC) 0.5 % Solution 7/1/2017 Active                ALLERGIES  Allergies   Allergen Reactions   • Other Environmental Unspecified     Goose down       PHYSICAL EXAM  VITAL SIGNS: /58 mmHg  Pulse 86  Temp(Src) 36.6 °C (97.8 °F)  Resp 20  Ht 1.702 m (5' 7.01\")  Wt 81 kg (178 lb 9.2 oz)  BMI 27.96 kg/m2  SpO2 94%  Constitutional: Awake, alert, in no acute distress, Non-toxic appearance.   HENT: Atraumatic. Bilateral external ears normal, mucous membranes moist, throat nonerythematous without exudates, nose is normal.  There is a 2 cm laceration over the right eyebrow. No active bleeding. There is no tenderness to the periorbital areas, zygoma, or mandible.  Eyes: PERRL, EOMI, conjunctiva moist, noninjected.      COURSE & MEDICAL DECISION MAKING  Pertinent Labs & Imaging studies reviewed. (See chart for details)  The patient presents with the above complaints. The wound was cleaned, anesthetized with 1% lidocaine with epinephrine. The wound was then thoroughly irrigated with sterile saline solution, and closed using 4 interrupted sutures of 6-0 nylon. Wound was stressed by nursing. Patient is to have sutures out in 5 days. He is to return to the ER for any worsening pain, redness, swelling, or any other problems.    FINAL IMPRESSION  1. Laceration right eyebrow 2 cm repaired  2.   3.         Electronically signed by: Chad Barba, 7/2/2017 8:08 PM    "

## 2017-07-03 NOTE — DISCHARGE INSTRUCTIONS
Laceration Care, Adult  A laceration is a cut that goes through all of the layers of the skin and into the tissue that is right under the skin. Some lacerations heal on their own. Others need to be closed with stitches (sutures), staples, skin adhesive strips, or skin glue. Proper laceration care minimizes the risk of infection and helps the laceration to heal better.  HOW TO CARE FOR YOUR LACERATION  If sutures or staples were used:  · Keep the wound clean and dry.  · If you were given a bandage (dressing), you should change it at least one time per day or as told by your health care provider. You should also change it if it becomes wet or dirty.  · Keep the wound completely dry for the first 24 hours or as told by your health care provider. After that time, you may shower or bathe. However, make sure that the wound is not soaked in water until after the sutures or staples have been removed.  · Clean the wound one time each day or as told by your health care provider:  ¨ Wash the wound with soap and water.  ¨ Rinse the wound with water to remove all soap.  ¨ Pat the wound dry with a clean towel. Do not rub the wound.  · After cleaning the wound, apply a thin layer of antibiotic ointment as told by your health care provider. This will help to prevent infection and keep the dressing from sticking to the wound.  · Have the sutures or staples removed as told by your health care provider.  If skin adhesive strips were used:  · Keep the wound clean and dry.  · If you were given a bandage (dressing), you should change it at least one time per day or as told by your health care provider. You should also change it if it becomes dirty or wet.  · Do not get the skin adhesive strips wet. You may shower or bathe, but be careful to keep the wound dry.  · If the wound gets wet, pat it dry with a clean towel. Do not rub the wound.  · Skin adhesive strips fall off on their own. You may trim the strips as the wound heals. Do not  remove skin adhesive strips that are still stuck to the wound. They will fall off in time.  If skin glue was used:  · Try to keep the wound dry, but you may briefly wet it in the shower or bath. Do not soak the wound in water, such as by swimming.  · After you have showered or bathed, gently pat the wound dry with a clean towel. Do not rub the wound.  · Do not do any activities that will make you sweat heavily until the skin glue has fallen off on its own.  · Do not apply liquid, cream, or ointment medicine to the wound while the skin glue is in place. Using those may loosen the film before the wound has healed.  · If you were given a bandage (dressing), you should change it at least one time per day or as told by your health care provider. You should also change it if it becomes dirty or wet.  · If a dressing is placed over the wound, be careful not to apply tape directly over the skin glue. Doing that may cause the glue to be pulled off before the wound has healed.  · Do not pick at the glue. The skin glue usually remains in place for 5-10 days, then it falls off of the skin.  General Instructions  · Take over-the-counter and prescription medicines only as told by your health care provider.  · If you were prescribed an antibiotic medicine or ointment, take or apply it as told by your doctor. Do not stop using it even if your condition improves.  · To help prevent scarring, make sure to cover your wound with sunscreen whenever you are outside after stitches are removed, after adhesive strips are removed, or when glue remains in place and the wound is healed. Make sure to wear a sunscreen of at least 30 SPF.  · Do not scratch or pick at the wound.  · Keep all follow-up visits as told by your health care provider. This is important.  · Check your wound every day for signs of infection. Watch for:  ¨ Redness, swelling, or pain.  ¨ Fluid, blood, or pus.  · Raise (elevate) the injured area above the level of your heart  while you are sitting or lying down, if possible.  SEEK MEDICAL CARE IF:  · You received a tetanus shot and you have swelling, severe pain, redness, or bleeding at the injection site.  · You have a fever.  · A wound that was closed breaks open.  · You notice a bad smell coming from your wound or your dressing.  · You notice something coming out of the wound, such as wood or glass.  · Your pain is not controlled with medicine.  · You have increased redness, swelling, or pain at the site of your wound.  · You have fluid, blood, or pus coming from your wound.  · You notice a change in the color of your skin near your wound.  · You need to change the dressing frequently due to fluid, blood, or pus draining from the wound.  · You develop a new rash.  · You develop numbness around the wound.  SEEK IMMEDIATE MEDICAL CARE IF:  · You develop severe swelling around the wound.  · Your pain suddenly increases and is severe.  · You develop painful lumps near the wound or on skin that is anywhere on your body.  · You have a red streak going away from your wound.  · The wound is on your hand or foot and you cannot properly move a finger or toe.  · The wound is on your hand or foot and you notice that your fingers or toes look pale or bluish.     This information is not intended to replace advice given to you by your health care provider. Make sure you discuss any questions you have with your health care provider.     Document Released: 12/18/2006 Document Revised: 05/03/2016 Document Reviewed: 12/14/2015  [employer] Interactive Patient Education ©2016 [employer]

## 2017-07-07 ENCOUNTER — HOSPITAL ENCOUNTER (EMERGENCY)
Facility: MEDICAL CENTER | Age: 67
End: 2017-07-07
Payer: MEDICARE

## 2017-07-07 VITALS
OXYGEN SATURATION: 94 % | HEART RATE: 60 BPM | DIASTOLIC BLOOD PRESSURE: 81 MMHG | SYSTOLIC BLOOD PRESSURE: 112 MMHG | RESPIRATION RATE: 16 BRPM | TEMPERATURE: 97.2 F

## 2017-07-07 PROCEDURE — 99281 EMR DPT VST MAYX REQ PHY/QHP: CPT

## 2017-07-07 ASSESSMENT — PAIN SCALES - GENERAL: PAINLEVEL_OUTOF10: 0

## 2017-07-07 NOTE — ED NOTES
Ambulatory to triage for suture removal. Denies fevers or pain. Healing well. 4 sutures removed without difficulty. Patient educated on wound care and sun protection.

## 2017-08-31 ENCOUNTER — TELEPHONE (OUTPATIENT)
Dept: MEDICAL GROUP | Age: 67
End: 2017-08-31

## 2017-08-31 NOTE — TELEPHONE ENCOUNTER
ANNUAL WELLNESS VISIT PRE-VISIT PLANNING     1.  Reviewed note from last office visit with PCP: YES    2.  If any orders were placed at last visit, do we have Results/Consult Notes?        •  Labs - Labs ordered, completed and results are in chart.       •  Imaging - Imaging was not ordered at last office visit.       •  Referrals - No referrals were ordered at last office visit.    3.  Immunizations were updated in [facility] using WebIZ?: Yes       •  WebIZ Recommendations: FLU and TDAP       •  Is patient due for Tdap? YES. Patient was notified of copay.       •  Is patient due for Shingles? NO     4.  Patient is due for the following Health Maintenance Topics:   Health Maintenance Due   Topic Date Due   • Annual Wellness Visit  1950   • IMM DTaP/Tdap/Td Vaccine (1 - Tdap) 09/30/1968   • IMM INFLUENZA (1) 09/01/2017       - Patient has completed PREVNAR (PCV13)  and ZOSTAVAX (Shingles) Immunization(s) per WebIZ. Chart has been updated.      5.  Reviewed/Updated the following with patient:       •   Preferred Pharmacy? YES       •   Preferred Lab? YES       •   Medications? YES. Was Abstract Encounter opened and chart updated? YES       •   Social History? YES. Was Abstract Encounter opened and chart updated? YES       •   Family History? YES. Was Abstract Encounter opened and chart updated? YES    6.  Care Team Updated:       •   DME Company (gait device, O2, CPAP, etc.): NO       •   Other Specialists (eye doctor, derm, GYN, cardiology, endo, etc): YES    7.  Patient has the following Care Path diagnoses on Problem List:  NONE    8.  Specialty Comments was updated with diagnosis information provided by [facility]: YES    9.  Patient was advised: “This is a free wellness visit. The provider will screen for medical conditions to help you stay healthy. If you have other concerns to address you may be asked to discuss these at a separate visit or there may be an additional fee.”     6.  Patient was informed to arrive 15  min prior to their scheduled appointment and bring in their medication bottles.

## 2017-09-06 ENCOUNTER — OFFICE VISIT (OUTPATIENT)
Dept: MEDICAL GROUP | Age: 67
End: 2017-09-06
Payer: MEDICARE

## 2017-09-06 VITALS
HEART RATE: 60 BPM | TEMPERATURE: 97.2 F | OXYGEN SATURATION: 95 % | WEIGHT: 180 LBS | DIASTOLIC BLOOD PRESSURE: 72 MMHG | HEIGHT: 68 IN | SYSTOLIC BLOOD PRESSURE: 118 MMHG | BODY MASS INDEX: 27.28 KG/M2

## 2017-09-06 DIAGNOSIS — G47.33 OSA (OBSTRUCTIVE SLEEP APNEA): ICD-10-CM

## 2017-09-06 DIAGNOSIS — E78.2 MIXED HYPERLIPIDEMIA: ICD-10-CM

## 2017-09-06 DIAGNOSIS — R73.01 IFG (IMPAIRED FASTING GLUCOSE): ICD-10-CM

## 2017-09-06 DIAGNOSIS — Z23 NEED FOR VACCINATION: ICD-10-CM

## 2017-09-06 DIAGNOSIS — N40.1 BPH (BENIGN PROSTATIC HYPERTROPHY) WITH URINARY RETENTION: ICD-10-CM

## 2017-09-06 DIAGNOSIS — R33.8 BPH (BENIGN PROSTATIC HYPERTROPHY) WITH URINARY RETENTION: ICD-10-CM

## 2017-09-06 DIAGNOSIS — R73.03 PREDIABETES: ICD-10-CM

## 2017-09-06 DIAGNOSIS — Z00.00 MEDICARE ANNUAL WELLNESS VISIT, INITIAL: ICD-10-CM

## 2017-09-06 PROCEDURE — G0439 PPPS, SUBSEQ VISIT: HCPCS | Mod: 25 | Performed by: INTERNAL MEDICINE

## 2017-09-06 PROCEDURE — 90662 IIV NO PRSV INCREASED AG IM: CPT | Performed by: INTERNAL MEDICINE

## 2017-09-06 PROCEDURE — 90715 TDAP VACCINE 7 YRS/> IM: CPT | Performed by: INTERNAL MEDICINE

## 2017-09-06 PROCEDURE — G0008 ADMIN INFLUENZA VIRUS VAC: HCPCS | Performed by: INTERNAL MEDICINE

## 2017-09-06 PROCEDURE — 90472 IMMUNIZATION ADMIN EACH ADD: CPT | Performed by: INTERNAL MEDICINE

## 2017-09-06 ASSESSMENT — PATIENT HEALTH QUESTIONNAIRE - PHQ9
CLINICAL INTERPRETATION OF PHQ2 SCORE: 0
SUM OF ALL RESPONSES TO PHQ QUESTIONS 1-9: 0

## 2017-09-06 NOTE — PROGRESS NOTES
Chief Complaint   Patient presents with   • Annual Wellness Visit         HPI:  Jamal is a 66 y.o. here for Medicare Annual Wellness Visit        Patient Active Problem List    Diagnosis Date Noted   • Mixed hyperlipidemia 04/04/2017   • BPH (benign prostatic hypertrophy) with urinary retention 04/04/2017   • Prediabetes 04/04/2017   • DELMA (obstructive sleep apnea) 05/06/2016       Current Outpatient Prescriptions   Medication Sig Dispense Refill   • timolol (TIMOPTIC) 0.5 % Solution INSTILL 1 DROP INTO RIGHT EYE EVERY MORNING  6   • atorvastatin (LIPITOR) 40 MG Tab Take 1 Tab by mouth every evening. 90 Tab 3   • dutasteride (AVODART) 0.5 MG capsule Take 1 Cap by mouth every day. 90 Cap 3   • tamsulosin (FLOMAX) 0.4 MG capsule Take 1 Cap by mouth ONE-HALF HOUR AFTER BREAKFAST. 90 Cap 3   • bimatoprost (LUMIGAN) 0.01 % Solution Place 1 Drop in both eyes every bedtime. 1 DROP IN EACH EYE AT BED TIME.       No current facility-administered medications for this visit.         Patient is taking medications as noted in medication list.  Current supplements as per medication list.     Allergies: Other environmental    Current social contact/activities: learning how to cook, playing golf, spending time with spouse     Is patient current with immunizations? No, due for FLU and TDAP. Patient is interested in receiving FLU and TDAP today.    He  reports that he quit smoking about 33 years ago. His smoking use included Cigarettes. He has a 22.50 pack-year smoking history. He has never used smokeless tobacco. He reports that he drinks about 4.2 oz of alcohol per week . He reports that he does not use drugs.  Counseling given: Yes        DPA/Advanced directive: Patient does not have an Advanced Directive.  A packet and workshop information was given on Advanced Directives.    ROS:    Gait: Uses no assistive device   Ostomy: no   Other tubes: no   Amputations: no   Chronic oxygen use no   Last eye exam 8/2017   Wears hearing  aids: no   : Denies incontinence.         Depression Screening    Little interest or pleasure in doing things?  0 - not at all  Feeling down, depressed, or hopeless? 0 - not at all  Patient Health Questionnaire Score: 0    If depressive symptoms identified deferred to follow up visit unless specifically addressed in assessment and plan.    Interpretation of PHQ-9 Total Score   Score Severity   1-4 No Depression   5-9 Mild Depression   10-14 Moderate Depression   15-19 Moderately Severe Depression   20-27 Severe Depression    Screening for Cognitive Impairment    Three Minute Recall (apple, watch, zuleika)  2/3    Draw clock face with all 12 numbers set to the hand to show 10 minutes past 11 o'clock  1 5/5  If cognitive concerns identified, deferred for follow up unless specifically addressed in assessment and plan.    Fall Risk Assessment    Has the patient had two or more falls in the last year or any fall with injury in the last year?  No  If fall risk identified, deferred for follow up unless specifically addressed in assessment and plan.    Safety Assessment    Throw rugs on floor.  Yes  Handrails on all stairs.  Yes  Good lighting in all hallways.  Yes  Difficulty hearing.  No  Patient counseled about all safety risks that were identified.    Functional Assessment ADLs    Are there any barriers preventing you from cooking for yourself or meeting nutritional needs?  No.    Are there any barriers preventing you from driving safely or obtaining transportation?  No.    Are there any barriers preventing you from using a telephone or calling for help?  No.    Are there any barriers preventing you from shopping?  No.    Are there any barriers preventing you from taking care of your own finances?  No.    Are there any barriers preventing you from managing your medications?  No.    Are you currently engaging any exercise or physical activity?  Yes.  Plays golf.    Health Maintenance Summary                Annual  "Wellness Visit Overdue 1950     IMM DTaP/Tdap/Td Vaccine Overdue 9/30/1968     IMM INFLUENZA Overdue 9/1/2017     IMM PNEUMOCOCCAL 65+ (ADULT) LOW/MEDIUM RISK SERIES Next Due 6/6/2018      Done 6/6/2017 Imm Admin: Pneumococcal Conjugate Vaccine (Prevnar/PCV-13)    COLONOSCOPY Next Due 11/11/2018      Done 11/11/2013 REFERRAL TO GI FOR COLONOSCOPY          Patient Care Team:  Maya Gage M.D. as PCP - General (Internal Medicine)  Theodore Guzman M.D. as Consulting Physician (Ophthalmology)  [facility] as Consulting Physician (Gastroenterology)    Social History   Substance Use Topics   • Smoking status: Former Smoker     Packs/day: 1.50     Years: 15.00     Types: Cigarettes     Quit date: 1/28/1984   • Smokeless tobacco: Never Used      Comment: quit 1985   • Alcohol use 4.2 oz/week     7 Glasses of wine per week      Comment: 1 glass a night     Family History   Problem Relation Age of Onset   • Heart Attack Father    • Cancer Sister 62     colon cancer     He  has a past medical history of Glaucoma and Hyperlipidemia.   Past Surgical History:   Procedure Laterality Date   • CATARACT PHACO WITH IOL  5/16/2013    Performed by Theodore Guzman M.D. at [facility]   • CATARACT PHACO WITH IOL  5/2/2013    Performed by Theodore Guzman M.D. at [facility]           Exam:     Blood pressure 118/72, pulse 60, temperature 36.2 °C (97.2 °F), height 1.727 m (5' 8\"), weight 81.6 kg (180 lb), SpO2 95 %. Body mass index is 27.37 kg/m².    Hearing excellent.    Dentition good  Alert, oriented in no acute distress.  Eye contact is good, speech goal directed, affect calm      Assessment and Plan. The following treatment and monitoring plan is recommended:    1. Medicare annual wellness visit, initial       2. Prediabetes  COMP METABOLIC PANEL    HEMOGLOBIN A1C    Well-controlled with diet and exercise. We will recheck lab in 6 month.     3. IFG (impaired fasting glucose)  COMP " METABOLIC PANEL    HEMOGLOBIN A1C     4. DELMA (obstructive sleep apnea)  CBC WITH DIFFERENTIAL    COMP METABOLIC PANEL    Chronic and stable. Well controlled with CPAP machine. Discussed the complications of untreated sleep apnea. Counseling for compliance with CPAP.     5. Mixed hyperlipidemia  COMP METABOLIC PANEL    LIPID PROFILE    Well-controlled. Continue. Lipitor 40 mg every evening Recheck lab 1-2 weeks before next follow up visit.    Advised to eat low fat, low carbohydrate and high fiber diet as well as do cardio physical exercise regularly.      6. BPH (benign prostatic hypertrophy) with urinary retention      Well-controlled. Continue current regimens, Flomax 0.4 mg daily and dutasteride 0.5 mg daily. Recheck lab 1-2 weeks before next follow up visit.     7. Need for vaccination  INFLUENZA VACCINE, HIGH DOSE (65+ ONLY)               Tdap and influenza vaccine were given today          after reviewing risks and benefits as well as side          effects of vaccine.   TDAP VACCINE =>6YO IM         Services suggested: No services needed at this time  Health Care Screening recommendations as per orders if indicated.  Referrals offered: PT/OT/Nutrition counseling/Behavioral Health/Smoking cessation as per orders if indicated.    Discussion today about general wellness and lifestyle habits:    · Prevent falls and reduce trip hazards; Cautioned about securing or removing rugs.  · Have a working fire alarm and carbon monoxide detector;   · Engage in regular physical activity and social activities       Follow-up: Return in about 6 months (around 3/6/2018), or if symptoms worsen or fail to improve, for hyperlipidemia, BPH, prediabetes, DELMA, lab review.

## 2018-02-01 ENCOUNTER — TELEPHONE (OUTPATIENT)
Dept: SLEEP MEDICINE | Facility: MEDICAL CENTER | Age: 68
End: 2018-02-01

## 2018-02-01 DIAGNOSIS — G47.33 OSA (OBSTRUCTIVE SLEEP APNEA): ICD-10-CM

## 2018-02-01 NOTE — TELEPHONE ENCOUNTER
Pt needs supply order faxed to [facility]. Was unaware of the change for SCP. Last seen by Robert on 02/23/2017. Has a 1 Year follow up but not until 05/01/2018. Has not had new supplies for 8 months.

## 2018-02-02 NOTE — TELEPHONE ENCOUNTER
Please sign order for mask and supplies. Patient has annual visit on 5/1/18 and was last seen 2/23/17

## 2018-03-06 ENCOUNTER — APPOINTMENT (OUTPATIENT)
Dept: MEDICAL GROUP | Age: 68
End: 2018-03-06
Payer: MEDICARE

## 2018-03-08 ENCOUNTER — APPOINTMENT (OUTPATIENT)
Dept: MEDICAL GROUP | Age: 68
End: 2018-03-08
Payer: MEDICARE

## 2018-03-13 ENCOUNTER — HOSPITAL ENCOUNTER (OUTPATIENT)
Dept: LAB | Facility: MEDICAL CENTER | Age: 68
End: 2018-03-13
Attending: INTERNAL MEDICINE
Payer: MEDICARE

## 2018-03-13 DIAGNOSIS — R73.01 IFG (IMPAIRED FASTING GLUCOSE): ICD-10-CM

## 2018-03-13 DIAGNOSIS — R73.03 PREDIABETES: ICD-10-CM

## 2018-03-13 DIAGNOSIS — E78.2 MIXED HYPERLIPIDEMIA: ICD-10-CM

## 2018-03-13 DIAGNOSIS — G47.33 OSA (OBSTRUCTIVE SLEEP APNEA): ICD-10-CM

## 2018-03-13 LAB
ALBUMIN SERPL BCP-MCNC: 3.9 G/DL (ref 3.2–4.9)
ALBUMIN/GLOB SERPL: 1.4 G/DL
ALP SERPL-CCNC: 58 U/L (ref 30–99)
ALT SERPL-CCNC: 24 U/L (ref 2–50)
ANION GAP SERPL CALC-SCNC: 6 MMOL/L (ref 0–11.9)
AST SERPL-CCNC: 24 U/L (ref 12–45)
BASOPHILS # BLD AUTO: 0.9 % (ref 0–1.8)
BASOPHILS # BLD: 0.04 K/UL (ref 0–0.12)
BILIRUB SERPL-MCNC: 0.8 MG/DL (ref 0.1–1.5)
BUN SERPL-MCNC: 16 MG/DL (ref 8–22)
CALCIUM SERPL-MCNC: 9.2 MG/DL (ref 8.5–10.5)
CHLORIDE SERPL-SCNC: 108 MMOL/L (ref 96–112)
CHOLEST SERPL-MCNC: 110 MG/DL (ref 100–199)
CO2 SERPL-SCNC: 27 MMOL/L (ref 20–33)
CREAT SERPL-MCNC: 0.98 MG/DL (ref 0.5–1.4)
EOSINOPHIL # BLD AUTO: 0.18 K/UL (ref 0–0.51)
EOSINOPHIL NFR BLD: 4 % (ref 0–6.9)
ERYTHROCYTE [DISTWIDTH] IN BLOOD BY AUTOMATED COUNT: 48.8 FL (ref 35.9–50)
EST. AVERAGE GLUCOSE BLD GHB EST-MCNC: 117 MG/DL
GLOBULIN SER CALC-MCNC: 2.8 G/DL (ref 1.9–3.5)
GLUCOSE SERPL-MCNC: 87 MG/DL (ref 65–99)
HBA1C MFR BLD: 5.7 % (ref 0–5.6)
HCT VFR BLD AUTO: 48.9 % (ref 42–52)
HDLC SERPL-MCNC: 39 MG/DL
HGB BLD-MCNC: 15.2 G/DL (ref 14–18)
IMM GRANULOCYTES # BLD AUTO: 0.02 K/UL (ref 0–0.11)
IMM GRANULOCYTES NFR BLD AUTO: 0.4 % (ref 0–0.9)
LDLC SERPL CALC-MCNC: 49 MG/DL
LYMPHOCYTES # BLD AUTO: 1.23 K/UL (ref 1–4.8)
LYMPHOCYTES NFR BLD: 27.2 % (ref 22–41)
MCH RBC QN AUTO: 30.2 PG (ref 27–33)
MCHC RBC AUTO-ENTMCNC: 31.1 G/DL (ref 33.7–35.3)
MCV RBC AUTO: 97.2 FL (ref 81.4–97.8)
MONOCYTES # BLD AUTO: 0.39 K/UL (ref 0–0.85)
MONOCYTES NFR BLD AUTO: 8.6 % (ref 0–13.4)
NEUTROPHILS # BLD AUTO: 2.67 K/UL (ref 1.82–7.42)
NEUTROPHILS NFR BLD: 58.9 % (ref 44–72)
NRBC # BLD AUTO: 0 K/UL
NRBC BLD-RTO: 0 /100 WBC
PLATELET # BLD AUTO: 187 K/UL (ref 164–446)
PMV BLD AUTO: 11 FL (ref 9–12.9)
POTASSIUM SERPL-SCNC: 4.1 MMOL/L (ref 3.6–5.5)
PROT SERPL-MCNC: 6.7 G/DL (ref 6–8.2)
RBC # BLD AUTO: 5.03 M/UL (ref 4.7–6.1)
SODIUM SERPL-SCNC: 141 MMOL/L (ref 135–145)
TRIGL SERPL-MCNC: 110 MG/DL (ref 0–149)
WBC # BLD AUTO: 4.5 K/UL (ref 4.8–10.8)

## 2018-03-13 PROCEDURE — 80061 LIPID PANEL: CPT

## 2018-03-13 PROCEDURE — 85025 COMPLETE CBC W/AUTO DIFF WBC: CPT

## 2018-03-13 PROCEDURE — 80053 COMPREHEN METABOLIC PANEL: CPT

## 2018-03-13 PROCEDURE — 83036 HEMOGLOBIN GLYCOSYLATED A1C: CPT

## 2018-03-13 PROCEDURE — 36415 COLL VENOUS BLD VENIPUNCTURE: CPT

## 2018-03-26 ENCOUNTER — TELEPHONE (OUTPATIENT)
Dept: MEDICAL GROUP | Age: 68
End: 2018-03-26

## 2018-03-26 NOTE — TELEPHONE ENCOUNTER
ESTABLISHED PATIENT PRE-VISIT PLANNING     Note: Patient will not be contacted if there is no indication to call.     1.  Reviewed notes from the last few office visits within the medical group: Yes    2.  If any orders were placed at last visit or intended to be done for this visit (i.e. 6 mos follow-up), do we have Results/Consult Notes?        •  Labs - Labs ordered, completed on 3/13/18 and results are in chart.   Note: If patient appointment is for lab review and patient did not complete labs, check with provider if OK to reschedule patient until labs completed.       •  Imaging - Imaging was not ordered at last office visit.       •  Referrals - No referrals were ordered at last office visit.    3. Is this appointment scheduled as a Hospital Follow-Up? No    4.  Immunizations were updated in Epic using WebIZ?: Epic matches WebIZ       •  Web Iz Recommendations: Patient is up to date on all vaccines    5.  Patient is due for the following Health Maintenance Topics:   There are no preventive care reminders to display for this patient.    - Patient is up-to-date on all Health Maintenance topics. No records have been requested at this time.    6.  MDX printed for Provider? NO YES    7.  Patient was NOT informed to arrive 15 min prior to their scheduled appointment and bring in their medication bottles.

## 2018-03-27 ENCOUNTER — OFFICE VISIT (OUTPATIENT)
Dept: MEDICAL GROUP | Age: 68
End: 2018-03-27
Payer: MEDICARE

## 2018-03-27 VITALS
BODY MASS INDEX: 26.9 KG/M2 | DIASTOLIC BLOOD PRESSURE: 70 MMHG | TEMPERATURE: 97 F | HEART RATE: 90 BPM | SYSTOLIC BLOOD PRESSURE: 110 MMHG | WEIGHT: 181.6 LBS | OXYGEN SATURATION: 92 % | HEIGHT: 69 IN

## 2018-03-27 DIAGNOSIS — R33.8 BENIGN PROSTATIC HYPERPLASIA WITH URINARY RETENTION: ICD-10-CM

## 2018-03-27 DIAGNOSIS — E78.2 MIXED HYPERLIPIDEMIA: ICD-10-CM

## 2018-03-27 DIAGNOSIS — Z23 NEED FOR HEPATITIS B VACCINATION: ICD-10-CM

## 2018-03-27 DIAGNOSIS — J30.89 CHRONIC NONSEASONAL ALLERGIC RHINITIS DUE TO POLLEN: ICD-10-CM

## 2018-03-27 DIAGNOSIS — N40.1 BENIGN PROSTATIC HYPERPLASIA WITH URINARY RETENTION: ICD-10-CM

## 2018-03-27 DIAGNOSIS — R73.01 IFG (IMPAIRED FASTING GLUCOSE): ICD-10-CM

## 2018-03-27 PROCEDURE — 90746 HEPB VACCINE 3 DOSE ADULT IM: CPT | Performed by: INTERNAL MEDICINE

## 2018-03-27 PROCEDURE — 99214 OFFICE O/P EST MOD 30 MIN: CPT | Mod: 25 | Performed by: INTERNAL MEDICINE

## 2018-03-27 PROCEDURE — G0010 ADMIN HEPATITIS B VACCINE: HCPCS | Performed by: INTERNAL MEDICINE

## 2018-03-27 RX ORDER — ATORVASTATIN CALCIUM 40 MG/1
40 TABLET, FILM COATED ORAL EVERY EVENING
Qty: 90 TAB | Refills: 3 | Status: SHIPPED | OUTPATIENT
Start: 2018-03-27 | End: 2019-04-27 | Stop reason: SDUPTHER

## 2018-03-27 RX ORDER — DUTASTERIDE 0.5 MG/1
0.5 CAPSULE, LIQUID FILLED ORAL DAILY
Qty: 90 CAP | Refills: 3 | Status: SHIPPED | OUTPATIENT
Start: 2018-03-27 | End: 2019-04-27 | Stop reason: SDUPTHER

## 2018-03-27 RX ORDER — MOMETASONE FUROATE 50 UG/1
2 SPRAY, METERED NASAL DAILY
Qty: 16 G | Refills: 3 | Status: SHIPPED | OUTPATIENT
Start: 2018-03-27 | End: 2018-07-23

## 2018-03-27 RX ORDER — TAMSULOSIN HYDROCHLORIDE 0.4 MG/1
0.4 CAPSULE ORAL
Qty: 90 CAP | Refills: 3 | Status: SHIPPED | OUTPATIENT
Start: 2018-03-27 | End: 2019-04-27 | Stop reason: SDUPTHER

## 2018-03-27 NOTE — PROGRESS NOTES
Subjective:   Jamal Dee is a 67 y.o. male here today for evaluation and management of:      Mixed hyperlipidemia  Patient is taking Lipitor 40 mg every evening without side effects. His cholesterol is stable and well-controlled except low HDL on recent blood tests. I reviewed the blood tests with him in clinic today. Patient reported that he is exercising regularly.    Results for JAMAL DEE (MRN [id number]) as of 3/27/2018 10:34   Ref. Range 3/13/2018 10:39   Cholesterol,Tot Latest Ref Range: 100 - 199 mg/dL 110   Triglycerides Latest Ref Range: 0 - 149 mg/dL 110   HDL Latest Ref Range: >=40 mg/dL 39 (A)   LDL Latest Ref Range: <100 mg/dL 49       Chronic nonseasonal allergic rhinitis due to pollen  Patient stated that he has runny nose constantly and sometimes have stuffy nose. He tries over-the-counter oral antihistamines without improvement. He wants to know how to rinse his nose. He also has Flonase nasal spray at home, but he did not feel improvement by using Flonase nasal spray.    BPH (benign prostatic hypertrophy) with urinary retention  Patient is taking dutasteride 0.5 mg daily and Flomax 0.4 mg daily. His urinary symptom is stable with current regimens. She denies side effects from taking dutasteride and Flomax.         Current medicines (including changes today)  Current Outpatient Prescriptions   Medication Sig Dispense Refill   • mometasone (NASONEX) 50 MCG/ACT nasal spray Spray 2 Sprays in nose every day. 16 g 3   • tamsulosin (FLOMAX) 0.4 MG capsule Take 1 Cap by mouth ONE-HALF HOUR AFTER BREAKFAST. 90 Cap 3   • dutasteride (AVODART) 0.5 MG capsule Take 1 Cap by mouth every day. 90 Cap 3   • atorvastatin (LIPITOR) 40 MG Tab Take 1 Tab by mouth every evening. 90 Tab 3   • timolol (TIMOPTIC) 0.5 % Solution INSTILL 1 DROP INTO RIGHT EYE EVERY MORNING  6   • bimatoprost (LUMIGAN) 0.01 % Solution Place 1 Drop in both eyes every bedtime. 1 DROP IN EACH EYE AT BED TIME.       No current  "facility-administered medications for this visit.      He  has a past medical history of Glaucoma and Hyperlipidemia.    ROS   No chest pain, no shortness of breath, no abdominal pain       Objective:     Blood pressure 110/70, pulse 90, temperature 36.1 °C (97 °F), height 1.753 m (5' 9\"), weight 82.4 kg (181 lb 9.6 oz), SpO2 92 %. Body mass index is 26.82 kg/m².   Physical Exam:  General: Alert, oriented and no acute distress.  Eye contact is good, speech goal directed, affect calm  HEENT: conjunctiva non-injected, sclera non-icteric.  Oral mucous membranes pink and moist with no lesions.  Pinna normal.   Lungs: Normal respiratory effort, clear to auscultation bilaterally with good excursion.  CV: regular rate and rhythm. No murmurs.   Abdomen: soft, non distended, nontender, Bowel sound normal.  Ext: no edema, color normal, vascularity normal, temperature normal      Assessment and Plan:   The following treatment plan was discussed     1. Chronic nonseasonal allergic rhinitis due to pollen  - Discussed at length to rinse sinuses with over the counter nasal wash or Netti pot once or twice a day and then use Nasonex nasal spray once or twice a day after rinsing sinuses  - Advised to try nasal steam therapy.   - mometasone (NASONEX) 50 MCG/ACT nasal spray; Spray 2 Sprays in nose every day.  Dispense: 16 g; Refill: 3    2. Mixed hyperlipidemia  - Well-controlled. Continue current regimens. Recheck lab 1-2 weeks before next follow up visit.  - Advised to eat low fat, low carbohydrate and high fiber diet as well as do cardio physical exercise regularly.   - atorvastatin (LIPITOR) 40 MG Tab; Take 1 Tab by mouth every evening.  Dispense: 90 Tab; Refill: 3  - COMP METABOLIC PANEL; Future  - LIPID PROFILE; Future    3. BPH (benign prostatic hypertrophy) with urinary retention  - Well-controlled. Continue current regimens. Recheck lab 1-2 weeks before next follow up visit.  - tamsulosin (FLOMAX) 0.4 MG capsule; Take 1 Cap by " mouth ONE-HALF HOUR AFTER BREAKFAST.  Dispense: 90 Cap; Refill: 3  - dutasteride (AVODART) 0.5 MG capsule; Take 1 Cap by mouth every day.  Dispense: 90 Cap; Refill: 3    4. IFG (impaired fasting glucose)  - Continue to control with diet and exercise. Recheck lab in 6 months.  - COMP METABOLIC PANEL; Future  - HEMOGLOBIN A1C; Future    5. Need for hepatitis B vaccination  - Hepatitis B vaccine was given today after reviewing risks and benefits as well as side effects of vaccine.  - HEPATITIS B VACCINE ADULT IM      Followup: Return in about 6 months (around 9/27/2018), or if symptoms worsen or fail to improve, for hyperlipidemia, BPH, chronic allergic rhinitis, lab review.      Please note that this dictation was created using voice recognition software. I have made every reasonable attempt to correct obvious errors, but I expect that there may have unintended errors in text, spelling, punctuation, or grammar that I did not discover.

## 2018-03-27 NOTE — ASSESSMENT & PLAN NOTE
Patient is taking dutasteride 0.5 mg daily and Flomax 0.4 mg daily. His urinary symptom is stable with current regimens. She denies side effects from taking dutasteride and Flomax.

## 2018-03-27 NOTE — ASSESSMENT & PLAN NOTE
Patient stated that he has runny nose constantly and sometimes have stuffy nose. He tries over-the-counter oral antihistamines without improvement. He wants to know how to rinse his nose. He also has Flonase nasal spray at home, but he did not feel improvement by using Flonase nasal spray.

## 2018-03-27 NOTE — ASSESSMENT & PLAN NOTE
Patient is taking Lipitor 40 mg every evening without side effects. His cholesterol is stable and well-controlled except low HDL on recent blood tests. I reviewed the blood tests with him in clinic today. Patient reported that he is exercising regularly.    Results for AYO HICKS (MRN [id number]) as of 3/27/2018 10:34   Ref. Range 3/13/2018 10:39   Cholesterol,Tot Latest Ref Range: 100 - 199 mg/dL 110   Triglycerides Latest Ref Range: 0 - 149 mg/dL 110   HDL Latest Ref Range: >=40 mg/dL 39 (A)   LDL Latest Ref Range: <100 mg/dL 49

## 2018-05-29 ENCOUNTER — TELEPHONE (OUTPATIENT)
Dept: MEDICAL GROUP | Age: 68
End: 2018-05-29

## 2018-05-29 ENCOUNTER — PATIENT OUTREACH (OUTPATIENT)
Dept: HEALTH INFORMATION MANAGEMENT | Facility: OTHER | Age: 68
End: 2018-05-29

## 2018-05-29 DIAGNOSIS — Z23 NEED FOR VACCINATION: ICD-10-CM

## 2018-05-29 NOTE — TELEPHONE ENCOUNTER
Patient is on the MA Schedule today for Hep B #2 vaccine/injection.    SPECIFIC Action To Be Taken: Orders pending, please sign.

## 2018-05-29 NOTE — PROGRESS NOTES
1. Attempt #: 1    2. HealthConnect Verified: yes    3. Verify PCP: yes    4. Care Team Updated:       •   DME Company (gait device, O2, CPAP, etc.): YES       •   Other Specialists (eye doctor, derm, GYN, cardiology, endo, etc): YES    5.  Reviewed/Updated the following with patient:       •   Communication Preference Obtained? YES       •   Preferred Pharmacy? YES       •   Preferred Lab? YES       •   Family History (document living status of immediate family members and if + hx of cancer, diabetes, hypertension, hyperlipidemia, heart attack, stroke) YES. Was Abstract Encounter opened and chart updated? YES    6. Pressy Activation: already active    7. Pressy Gagan: no    8. Annual Wellness Visit Scheduling  Scheduling Status:Scheduled      9. Care Gap Scheduling (Attempt to Schedule EACH Overdue Care Gap!)     There are no preventive care reminders to display for this patient.     Scheduled patient for Annual Wellness Visit    10. Patient was advised: “This is a free wellness visit. The provider will screen for medical conditions to help you stay healthy. If you have other concerns to address you may be asked to discuss these at a separate visit or there may be an additional fee.”     11. Patient was informed to arrive 15 min prior to their scheduled appointment and bring in their medication bottles.

## 2018-05-30 ENCOUNTER — NON-PROVIDER VISIT (OUTPATIENT)
Dept: MEDICAL GROUP | Age: 68
End: 2018-05-30
Payer: MEDICARE

## 2018-05-30 DIAGNOSIS — Z23 NEED FOR VACCINATION: ICD-10-CM

## 2018-05-30 PROCEDURE — 90746 HEPB VACCINE 3 DOSE ADULT IM: CPT | Performed by: INTERNAL MEDICINE

## 2018-05-30 PROCEDURE — G0010 ADMIN HEPATITIS B VACCINE: HCPCS | Performed by: INTERNAL MEDICINE

## 2018-05-30 NOTE — NON-PROVIDER
"Jamal Dee is a 67 y.o. male here for a non-provider visit for:   HEPATITIS B 2 of 3    Reason for immunization: continue or complete series started at the office  Immunization records indicate need for vaccine: Yes, confirmed with Epic  Minimum interval has been met for this vaccine: Yes  ABN completed: Not Indicated    Order and dose verified by: SOPHIA  VIS Dated  7/20/2016 was given to patient: Yes  All IAC Questionnaire questions were answered \"No.\"    Patient tolerated injection and no adverse effects were observed or reported: Yes    Pt scheduled for next dose in series: No    "

## 2018-07-23 ENCOUNTER — SLEEP CENTER VISIT (OUTPATIENT)
Dept: SLEEP MEDICINE | Facility: MEDICAL CENTER | Age: 68
End: 2018-07-23
Payer: MEDICARE

## 2018-07-23 VITALS
RESPIRATION RATE: 15 BRPM | DIASTOLIC BLOOD PRESSURE: 65 MMHG | HEIGHT: 69 IN | BODY MASS INDEX: 26.96 KG/M2 | WEIGHT: 182 LBS | OXYGEN SATURATION: 90 % | HEART RATE: 66 BPM | SYSTOLIC BLOOD PRESSURE: 117 MMHG

## 2018-07-23 DIAGNOSIS — G47.33 OSA (OBSTRUCTIVE SLEEP APNEA): ICD-10-CM

## 2018-07-23 PROCEDURE — 99213 OFFICE O/P EST LOW 20 MIN: CPT | Performed by: NURSE PRACTITIONER

## 2018-07-23 NOTE — PROGRESS NOTES
Chief Complaint   Patient presents with   • Follow-Up     ANNUAL    • Apnea       HPI:  Jamal Dee is a 67 y.o. year old male here today for follow-up on his obstructive sleep apnea. Polysomnogram on 5/2/2016 which indicates evidence of severe obstructive sleep apnea with an AHI of 55.6 and a low 02 saturation of 68%. He was successfully titrated to CPAP at 10 CM H20 with a resultant AHI of 2.3 and a low 02 saturation of 90%. He was started on a CPAP of 10 CM H20. He felt the pressures were too high on that setting and he requested a decrease. He was empirically decreased to 8 CM H20, but continued to have tolerance issues. He was decreased to 6 CM. Repeat compliance card download at his last office visit indicated an AHI of 6.9 with an average use of 7 hours at night.   Repeat compliance card download today in the office indicates an AHI of 15.1 with an average use of 7.5 hours at night.   His weight has been stable. He denies excessive mask leaks. He has a full face mask which he feels is comfortable. He does feel he sleeps better on therapy and wakes more refreshed. He denies any morning headaches.       Past Medical History:   Diagnosis Date   • Glaucoma    • Hyperlipidemia        Past Surgical History:   Procedure Laterality Date   • CATARACT PHACO WITH IOL  5/16/2013    Performed by Theodore Guzman M.D. at [facility]   • CATARACT PHACO WITH IOL  5/2/2013    Performed by Theodore Guzman M.D. at [facility]       Family History   Problem Relation Age of Onset   • Heart Attack Father    • Cancer Sister 62     colon cancer   • Sleep Apnea Neg Hx        Social History     Social History   • Marital status:      Spouse name: N/A   • Number of children: N/A   • Years of education: N/A     Occupational History   • Not on file.     Social History Main Topics   • Smoking status: Former Smoker     Packs/day: 1.50     Years: 15.00     Types: Cigarettes     Quit date: 1/28/1984    • Smokeless tobacco: Never Used      Comment: quit 1985   • Alcohol use 4.2 oz/week     7 Glasses of wine per week      Comment: 1 glass a night   • Drug use: No   • Sexual activity: Not Currently     Partners: Female     Other Topics Concern   • Not on file     Social History Narrative   • No narrative on file         ROS:  Constitutional: Denies fevers, chills, sweats, fatigue, weight loss  Eyes: Denies glasses, vision loss, pain, drainage, double vision  Ears/Nose/Mouth/Throat: Denies rhinitis, nasal congestion, ear ache, difficulty hearing, sore throat, persistent hoarseness, decayed teeth/toothache  Cardiovascular: Denies chest pain, tightness, palpitations, swelling in feet/legs, fainting, difficulty breathing when laying down  Respiratory: Denies shortness of breath, cough, sputum, wheezing, painful breathing, coughing up blood  GI: Denies heartburn, difficulty swallowing, nausea, vomiting, abdominal pain, diarrhea, constipation  : Denies frequent urination, painful urination  Integumentary: Denies rashes, lumps or color changes  MSK: Denies painful joints, sore muscles, and back pain.   Neurological: Denies frequent headaches, dizziness, weakness  Sleep: See HPI       Current Outpatient Prescriptions   Medication Sig Dispense Refill   • dutasteride (AVODART) 0.5 MG capsule Take 1 Cap by mouth every day. 90 Cap 3   • atorvastatin (LIPITOR) 40 MG Tab Take 1 Tab by mouth every evening. 90 Tab 3   • timolol (TIMOPTIC) 0.5 % Solution INSTILL 1 DROP INTO RIGHT EYE EVERY MORNING  6   • tamsulosin (FLOMAX) 0.4 MG capsule Take 1 Cap by mouth ONE-HALF HOUR AFTER BREAKFAST. 90 Cap 3   • bimatoprost (LUMIGAN) 0.01 % Solution Place 1 Drop in both eyes every bedtime. 1 DROP IN EACH EYE AT BED TIME.       No current facility-administered medications for this visit.        Allergies   Allergen Reactions   • Other Environmental Unspecified     Goose down       Blood pressure 117/65, pulse 66, resp. rate 15, height 1.753  "m (5' 9\"), weight 82.6 kg (182 lb), SpO2 90 %.    PE:   Appearance: Well developed, well nourished, no acute distress  Eyes: PERRL, EOM intact, sclera white, conjunctiva moist  Ears: no lesions or deformities  Hearing: grossly intact  Nose: no lesions or deformities  Oropharynx: tongue normal, posterior pharynx without erythema or exudate  Mallampati Classification: Class 4  Neck: supple, trachea midline, no masses   Respiratory effort: no intercostal retractions or use of accessory muscles  Lung auscultation: no rales, rhonchi or wheezes  Heart auscultation: no murmur rub or gallop  Extremities: no cyanosis or edema  Abdomen: soft ,non tender, no masses  Gait and Station: normal  Digits and nails: no clubbing, cyanosis, petechiae or nodes.  Cranial nerves: grossly intact  Skin: no rashes, lesions or ulcers noted  Orientation: Oriented to time, person and place  Mood and affect: mood and affect appropriate, normal interaction with examiner  Judgement: Intact          Assessment:    1. DELMA (obstructive sleep apnea)  DME OTHER         Plan:    1) His AHI has increased to 15.1. He does not feel his mask is leaking. Increase CPAP to 7 CM H20.   2) Sleep hygiene discussed.  3) Follow up in 3 months with repeat compliance card download, sooner if needed.       "

## 2018-07-23 NOTE — PATIENT INSTRUCTIONS
Plan:    1) His AHI has increased to 15.1. He does not feel his mask is leaking. Increase CPAP to 7 CM H20.   2) Sleep hygiene discussed.  3) Follow up in 3 months with repeat compliance card download, sooner if needed.

## 2018-09-14 ENCOUNTER — TELEPHONE (OUTPATIENT)
Dept: MEDICAL GROUP | Age: 68
End: 2018-09-14

## 2018-09-14 NOTE — TELEPHONE ENCOUNTER
ANNUAL WELLNESS VISIT PRE-VISIT PLANNING WITH OUTREACH    1.  If any orders were placed at last visit, do we have Results/Consult Notes?        •  Labs - Labs ordered, but not to be completed until 10/18.  Note: If patient appointment is for lab review and patient did not complete labs, check with provider if OK to reschedule patient until labs completed.       •  Imaging - Imaging was not ordered at last office visit.       •  Referrals - No referrals were ordered at last office visit.    2.  Immunizations were updated in Epic using WebIZ?:Epic matches WebIZ       •  WebIZ Recommendations: FLU and PNEUMOVAX (PPSV23)       •  Is patient due for Tdap? NO       •  Is patient due for Shingles?NO    3.  Patient has the following Care Path diagnoses on Problem List:  NONE    4.  MDX printed for Provider? NO

## 2018-09-18 ENCOUNTER — OFFICE VISIT (OUTPATIENT)
Dept: MEDICAL GROUP | Age: 68
End: 2018-09-18
Payer: MEDICARE

## 2018-09-18 VITALS
BODY MASS INDEX: 27.19 KG/M2 | SYSTOLIC BLOOD PRESSURE: 112 MMHG | TEMPERATURE: 97.1 F | HEIGHT: 69 IN | HEART RATE: 58 BPM | OXYGEN SATURATION: 91 % | DIASTOLIC BLOOD PRESSURE: 66 MMHG | WEIGHT: 183.6 LBS

## 2018-09-18 DIAGNOSIS — R73.03 PREDIABETES: ICD-10-CM

## 2018-09-18 DIAGNOSIS — J30.89 CHRONIC NONSEASONAL ALLERGIC RHINITIS DUE TO POLLEN: ICD-10-CM

## 2018-09-18 DIAGNOSIS — Z23 NEED FOR VACCINATION: ICD-10-CM

## 2018-09-18 DIAGNOSIS — G47.33 OSA (OBSTRUCTIVE SLEEP APNEA): ICD-10-CM

## 2018-09-18 DIAGNOSIS — N40.1 BENIGN PROSTATIC HYPERPLASIA WITH URINARY RETENTION: ICD-10-CM

## 2018-09-18 DIAGNOSIS — Z12.11 SCREENING FOR COLON CANCER: ICD-10-CM

## 2018-09-18 DIAGNOSIS — Z00.00 MEDICARE ANNUAL WELLNESS VISIT, SUBSEQUENT: ICD-10-CM

## 2018-09-18 DIAGNOSIS — E78.2 MIXED HYPERLIPIDEMIA: ICD-10-CM

## 2018-09-18 DIAGNOSIS — R33.8 BENIGN PROSTATIC HYPERPLASIA WITH URINARY RETENTION: ICD-10-CM

## 2018-09-18 PROCEDURE — G0009 ADMIN PNEUMOCOCCAL VACCINE: HCPCS | Performed by: INTERNAL MEDICINE

## 2018-09-18 PROCEDURE — 90732 PPSV23 VACC 2 YRS+ SUBQ/IM: CPT | Performed by: INTERNAL MEDICINE

## 2018-09-18 PROCEDURE — 90662 IIV NO PRSV INCREASED AG IM: CPT | Performed by: INTERNAL MEDICINE

## 2018-09-18 PROCEDURE — G0439 PPPS, SUBSEQ VISIT: HCPCS | Mod: 25 | Performed by: INTERNAL MEDICINE

## 2018-09-18 PROCEDURE — G0008 ADMIN INFLUENZA VIRUS VAC: HCPCS | Performed by: INTERNAL MEDICINE

## 2018-09-18 ASSESSMENT — PATIENT HEALTH QUESTIONNAIRE - PHQ9: CLINICAL INTERPRETATION OF PHQ2 SCORE: 0

## 2018-09-18 ASSESSMENT — ACTIVITIES OF DAILY LIVING (ADL): BATHING_REQUIRES_ASSISTANCE: 0

## 2018-09-18 ASSESSMENT — ENCOUNTER SYMPTOMS: GENERAL WELL-BEING: GOOD

## 2018-09-18 NOTE — PROGRESS NOTES
Chief Complaint   Patient presents with   • Annual Wellness Visit         HPI:  Jamal is a 67 y.o. here for Medicare Annual Wellness Visit        Patient Active Problem List    Diagnosis Date Noted   • Chronic nonseasonal allergic rhinitis due to pollen 03/27/2018   • Mixed hyperlipidemia 04/04/2017   • BPH (benign prostatic hypertrophy) with urinary retention 04/04/2017   • Prediabetes 04/04/2017   • DELMA (obstructive sleep apnea) 05/06/2016       Current Outpatient Prescriptions   Medication Sig Dispense Refill   • tamsulosin (FLOMAX) 0.4 MG capsule Take 1 Cap by mouth ONE-HALF HOUR AFTER BREAKFAST. 90 Cap 3   • dutasteride (AVODART) 0.5 MG capsule Take 1 Cap by mouth every day. 90 Cap 3   • atorvastatin (LIPITOR) 40 MG Tab Take 1 Tab by mouth every evening. 90 Tab 3   • timolol (TIMOPTIC) 0.5 % Solution INSTILL 1 DROP INTO RIGHT EYE EVERY MORNING  6   • bimatoprost (LUMIGAN) 0.01 % Solution Place 1 Drop in both eyes every bedtime. 1 DROP IN EACH EYE AT BED TIME.       No current facility-administered medications for this visit.         Patient is taking medications as noted in medication list.  Current supplements as per medication list.     Allergies: Other environmental    Current social contact/activities: travel     Is patient current with immunizations? No, due for FLU and PNEUMOVAX (PPSV23). Patient is interested in receiving FLU and PNEUMOVAX (PPSV23) today.    He  reports that he quit smoking about 34 years ago. His smoking use included Cigarettes. He has a 22.50 pack-year smoking history. He has never used smokeless tobacco. He reports that he drinks about 4.2 oz of alcohol per week . He reports that he does not use drugs.  Counseling given: Yes        DPA/Advanced directive: Patient does not have an Advanced Directive.  A packet and workshop information was given on Advanced Directives.    ROS:    Gait: Uses no assistive device   Ostomy: No   Other tubes: No   Amputations: No   Chronic oxygen use No    Last eye exam 8/18   Wears hearing aids: No   : Denies any urinary leakage during the last 6 months      Screening:        Depression Screening    Little interest or pleasure in doing things?  0 - not at all  Feeling down, depressed, or hopeless? 0 - not at all  Patient Health Questionnaire Score: 0    If depressive symptoms identified deferred to follow up visit unless specifically addressed in assessment and plan.    Interpretation of PHQ-9 Total Score   Score Severity   1-4 No Depression   5-9 Mild Depression   10-14 Moderate Depression   15-19 Moderately Severe Depression   20-27 Severe Depression    Screening for Cognitive Impairment    Three Minute Recall (leader, season, table)  3/3    James clock face with all 12 numbers and set the hands to show 10 past 11.  No Correct time  If cognitive concerns identified, deferred for follow up unless specifically addressed in assessment and plan.    Fall Risk Assessment    Has the patient had two or more falls in the last year or any fall with injury in the last year?  No  If fall risk identified, deferred for follow up unless specifically addressed in assessment and plan.    Safety Assessment    Throw rugs on floor.  Yes  Handrails on all stairs.  Yes  Good lighting in all hallways.  Yes  Difficulty hearing.  No  Patient counseled about all safety risks that were identified.    Functional Assessment ADLs    Are there any barriers preventing you from cooking for yourself or meeting nutritional needs?  No.    Are there any barriers preventing you from driving safely or obtaining transportation?  No.    Are there any barriers preventing you from using a telephone or calling for help?  No.    Are there any barriers preventing you from shopping?  No.    Are there any barriers preventing you from taking care of your own finances?  No.    Are there any barriers preventing you from managing your medications?  No.    Are there any barriers preventing you from showering,  bathing or dressing yourself?  No.    Are you currently engaging in any exercise or physical activity?  Yes.  walk  What is your perception of your health?  Good.    Health Maintenance Summary                IMM ZOSTER VACCINES Overdue 3/9/2015      Done 1/12/2015 Imm Admin: Zoster Vaccine Live (ZVL) (Zostavax)    IMM PNEUMOCOCCAL 65+ (ADULT) LOW/MEDIUM RISK SERIES Overdue 6/6/2018      Done 6/6/2017 Imm Admin: Pneumococcal Conjugate Vaccine (Prevnar/PCV-13)    IMM INFLUENZA Overdue 9/1/2018      Done 9/6/2017 Imm Admin: Influenza Vaccine Adult HD    Annual Wellness Visit Overdue 9/7/2018      Done 9/6/2017 Visit Dx: Medicare annual wellness visit, initial    COLONOSCOPY Next Due 11/11/2018      Done 11/11/2013 REFERRAL TO GI FOR COLONOSCOPY    IMM DTaP/Tdap/Td Vaccine Next Due 9/6/2027      Done 9/6/2017 Imm Admin: Tdap Vaccine          Patient Care Team:  Maya Gage M.D. as PCP - General (Internal Medicine)  Theodore Guzman M.D. as Consulting Physician (Ophthalmology)  [facility] as Consulting Physician (Gastroenterology)  [facility] as Home Health Provider (DME Supplier)    Social History   Substance Use Topics   • Smoking status: Former Smoker     Packs/day: 1.50     Years: 15.00     Types: Cigarettes     Quit date: 1/28/1984   • Smokeless tobacco: Never Used      Comment: quit 1985   • Alcohol use 4.2 oz/week     7 Glasses of wine per week      Comment: 1 glass a night     Family History   Problem Relation Age of Onset   • Heart Attack Father    • Cancer Sister 62        colon cancer   • Sleep Apnea Neg Hx      He  has a past medical history of Glaucoma and Hyperlipidemia.   Past Surgical History:   Procedure Laterality Date   • CATARACT PHACO WITH IOL  5/16/2013    Performed by Theodore Guzman M.D. at [facility]   • CATARACT PHACO WITH IOL  5/2/2013    Performed by Theodore Guzman M.D. at [facility]           Exam:     Blood pressure 112/66, pulse (!)  "58, temperature 36.2 °C (97.1 °F), height 1.753 m (5' 9\"), weight 83.3 kg (183 lb 9.6 oz), SpO2 91 %. Body mass index is 27.11 kg/m².    Hearing good.    Dentition good  Alert, oriented in no acute distress.  Eye contact is good, speech goal directed, affect calm      Assessment and Plan. The following treatment and monitoring plan is recommended:    1. Medicare annual wellness visit, subsequent  Annual Wellness Visit - Includes PPPS Subsequent ()   2. Prediabetes  Annual Wellness Visit - Includes PPPS Subsequent ()    Stable. Last A1c was 5.7 in March 2018. Patient is advised to avoid eating simple carbohydrate and processed sugar. Encourage patient to do regular physical ex   3. DELMA (obstructive sleep apnea)  Annual Wellness Visit - Includes PPPS Subsequent ()    Stable. Discussed to comply with CPAP machine and the complications of uncontrolled sleep apnea.  Patient will follow with pulmonologist in November 2018.   4. Mixed hyperlipidemia  Annual Wellness Visit - Includes PPPS Subsequent ()    Well-controlled. Continue current regimen, atorvastatin 40 mg every evening. Recheck lab 1-2 weeks before next follow up visit.  Reviewed the risks and benefits of treatment and potential side effects of medication.  Advised to eat low fat, low carbohydrate and high fiber diet as well as do cardio physical exercise regularly.   5. Chronic nonseasonal allergic rhinitis due to pollen  Annual Wellness Visit - Includes PPPS Subsequent ()    Chronic and stable. Continue conservative treatment with sinus rinse and over-the-counter antihistamine as needed.   6. BPH (benign prostatic hypertrophy) with urinary retention  Annual Wellness Visit - Includes PPPS Subsequent ()    Improved. Continue dutasteride 0.5 mg daily and Flomax 0.4 mg daily half hour after breakfast. Reviewed potential side effects of medications with patient.  Patient denied side effects from taking those medications.   7. Need for " vaccination  INFLUENZA VACCINE, HIGH DOSE (65+ ONLY)    Pneumococal Polysaccharide Vaccine 23-Valent =>1yo SQ/IM    Annual Wellness Visit - Includes PPPS Subsequent ()    Influenza and Pneumovax 23 vaccine were given today after reviewing risks and benefits as well as side effects of vaccine.   8. Screening for colon cancer  REFERRAL TO GI FOR COLONOSCOPY    Annual Wellness Visit - Includes PPPS Subsequent ()    Patient had colonoscopy 5 years ago with digestive [facility] and recommended to repeat it in 5 year due to tubular adenoma polyp.  Referral to digestive health was placed.         Services suggested: No services needed at this time  Health Care Screening recommendations as per orders if indicated.  Referrals offered: PT/OT/Nutrition counseling/Behavioral Health/Smoking cessation as per orders if indicated.    Discussion today about general wellness and lifestyle habits:    · Prevent falls and reduce trip hazards; Cautioned about securing or removing rugs.  · Have a working fire alarm and carbon monoxide detector;   · Engage in regular physical activity and social activities       Follow-up: Return in about 2 weeks (around 10/2/2018), or if symptoms worsen or fail to improve, for Prediabetes, Hyperlipidemia, DELMA, BPH, Lab review.

## 2018-10-01 ENCOUNTER — TELEPHONE (OUTPATIENT)
Dept: MEDICAL GROUP | Age: 68
End: 2018-10-01

## 2018-10-01 NOTE — TELEPHONE ENCOUNTER
ESTABLISHED PATIENT PRE-VISIT PLANNING     Note: Patient will not be contacted if there is no indication to call.     1.  Reviewed notes from the last few office visits within the medical group: Yes    2.  If any orders were placed at last visit or intended to be done for this visit (i.e. 6 mos follow-up), do we have Results/Consult Notes?        •  Labs - Labs were not ordered at last office visit.   Note: If patient appointment is for lab review and patient did not complete labs, check with provider if OK to reschedule patient until labs completed.       •  Imaging - Imaging was not ordered at last office visit.       •  Referrals - Referral ordered, patient has NOT been seen.    3. Is this appointment scheduled as a Hospital Follow-Up? No    4.  Immunizations were updated in Epic using WebIZ?: Epic matches WebIZ       •  Web Iz Recommendations: Patient is up to date on all vaccines    5.  Patient is due for the following Health Maintenance Topics:   Health Maintenance Due   Topic Date Due   • IMM ZOSTER VACCINES (2 of 3) 03/09/2015       - Patient is up-to-date on all Health Maintenance topics. No records have been requested at this time.    6.  MDX printed for Provider? NO    7.  Patient was NOT informed to arrive 15 min prior to their scheduled appointment and bring in their medication bottles.

## 2018-10-02 ENCOUNTER — OFFICE VISIT (OUTPATIENT)
Dept: MEDICAL GROUP | Age: 68
End: 2018-10-02
Payer: MEDICARE

## 2018-10-02 VITALS
BODY MASS INDEX: 28.47 KG/M2 | TEMPERATURE: 97.7 F | WEIGHT: 181.4 LBS | SYSTOLIC BLOOD PRESSURE: 106 MMHG | OXYGEN SATURATION: 95 % | HEIGHT: 67 IN | DIASTOLIC BLOOD PRESSURE: 68 MMHG | HEART RATE: 73 BPM

## 2018-10-02 DIAGNOSIS — R73.03 PREDIABETES: ICD-10-CM

## 2018-10-02 DIAGNOSIS — R33.8 BENIGN PROSTATIC HYPERPLASIA WITH URINARY RETENTION: ICD-10-CM

## 2018-10-02 DIAGNOSIS — N40.1 BENIGN PROSTATIC HYPERPLASIA WITH URINARY RETENTION: ICD-10-CM

## 2018-10-02 DIAGNOSIS — J30.89 CHRONIC NONSEASONAL ALLERGIC RHINITIS DUE TO POLLEN: ICD-10-CM

## 2018-10-02 DIAGNOSIS — G47.33 OSA (OBSTRUCTIVE SLEEP APNEA): ICD-10-CM

## 2018-10-02 DIAGNOSIS — E78.2 MIXED HYPERLIPIDEMIA: ICD-10-CM

## 2018-10-02 PROCEDURE — 99214 OFFICE O/P EST MOD 30 MIN: CPT | Performed by: INTERNAL MEDICINE

## 2018-10-02 NOTE — ASSESSMENT & PLAN NOTE
Patient stated that he is using CPAP machine as managed by pulmonology sleep medicine.  He is doing stable with CPAP machine and he tolerates CPAP.

## 2018-10-02 NOTE — ASSESSMENT & PLAN NOTE
Patient reported that his allergy symptoms is well controlled.  He has Martin pot and Flonase nasal spray at home which he uses as needed.  He does not require to take oral antihistamine lately.

## 2018-10-02 NOTE — ASSESSMENT & PLAN NOTE
Patient is taking Flomax 0.4 mg daily and dutasteride 0.5 mg daily.  His urinary symptom improved with current regimens and he denies side effects from taking due to steroids and Flomax.

## 2018-10-02 NOTE — ASSESSMENT & PLAN NOTE
Patient has prediabetes with A1c 5.7 on 3/13/18.  Patient stated that he will try to do more exercise as he has not exercised regularly.  He will also avoid processed sugar.

## 2018-10-02 NOTE — PROGRESS NOTES
Subjective:   Jamal Dee is a 68 y.o. male here today for evaluation and management of:      Prediabetes  Patient has prediabetes with A1c 5.7 on 3/13/18.  Patient stated that he will try to do more exercise as he has not exercised regularly.  He will also avoid processed sugar.    DELMA (obstructive sleep apnea)  Patient stated that he is using CPAP machine as managed by pulmonology sleep medicine.  He is doing stable with CPAP machine and he tolerates CPAP.    Mixed hyperlipidemia  He is taking atorvastatin 40 mg every evening.  His cholesterol is stable and well controlled.  He denies side effects from taking atorvastatin.  I reviewed blood test result with him in clinic today.    Results for JAMAL DEE (MRN [id number]) as of 10/2/2018 10:47   Ref. Range 6/1/2017 08:26 3/13/2018 10:39   Cholesterol,Tot Latest Ref Range: 100 - 199 mg/dL 141 110   Triglycerides Latest Ref Range: 0 - 149 mg/dL 147 110   HDL Latest Ref Range: >=40 mg/dL 45 39 (A)   LDL Latest Ref Range: <100 mg/dL 67 49       Chronic nonseasonal allergic rhinitis due to pollen  Patient reported that his allergy symptoms is well controlled.  He has Vandana pot and Flonase nasal spray at home which he uses as needed.  He does not require to take oral antihistamine lately.    BPH (benign prostatic hypertrophy) with urinary retention  Patient is taking Flomax 0.4 mg daily and dutasteride 0.5 mg daily.  His urinary symptom improved with current regimens and he denies side effects from taking due to steroids and Flomax.         Current medicines (including changes today)  Current Outpatient Prescriptions   Medication Sig Dispense Refill   • tamsulosin (FLOMAX) 0.4 MG capsule Take 1 Cap by mouth ONE-HALF HOUR AFTER BREAKFAST. 90 Cap 3   • dutasteride (AVODART) 0.5 MG capsule Take 1 Cap by mouth every day. 90 Cap 3   • atorvastatin (LIPITOR) 40 MG Tab Take 1 Tab by mouth every evening. 90 Tab 3   • timolol (TIMOPTIC) 0.5 % Solution INSTILL 1  "DROP INTO RIGHT EYE EVERY MORNING  6   • bimatoprost (LUMIGAN) 0.01 % Solution Place 1 Drop in both eyes every bedtime. 1 DROP IN EACH EYE AT BED TIME.       No current facility-administered medications for this visit.      He  has a past medical history of Glaucoma and Hyperlipidemia.    ROS   No chest pain, no shortness of breath, no abdominal pain       Objective:     Blood pressure 106/68, pulse 73, temperature 36.5 °C (97.7 °F), temperature source Temporal, height 1.689 m (5' 6.5\"), weight 82.3 kg (181 lb 6.4 oz), SpO2 95 %. Body mass index is 28.84 kg/m².   Physical Exam:  General: Alert, oriented and no acute distress.  Eye contact is good, speech goal directed, affect calm  HEENT: conjunctiva non-injected, sclera non-icteric.  Oral mucous membranes pink and moist with no lesions.  Pinna normal.  Lungs: Normal respiratory effort, clear to auscultation bilaterally with good excursion.  CV: regular rate and rhythm. No murmurs.   Abdomen: soft, non distended, nontender, Bowel sound normal.  Ext: no edema, color normal, vascularity normal, temperature normal        Assessment and Plan:   The following treatment plan was discussed     1. Mixed hyperlipidemia  - Well-controlled. Continue current regimens, atorvastatin 40 mg every evening. Recheck lab 1-2 weeks before next follow up visit.  - Reviewed the risks and benefits of treatment and potential side effects of medication.  - Advised to eat low fat, low carbohydrate and high fiber diet as well as do cardio physical exercise regularly.    2. Chronic nonseasonal allergic rhinitis due to pollen  - Stable.  Patient will continue Central pot to rinse sinus as needed and Flonase nasal spray 1-2 spray/day as needed.  Patient already received flu vaccine and pneumonia vaccine on 9/18/18.    3. BPH (benign prostatic hypertrophy) with urinary retention  - Well-controlled. Continue current regimens, Flomax 0.4 mg daily and dutasteride 0.5 mg daily. Recheck lab 1-2 weeks " before next follow up visit.    4. Prediabetes  - Stable.  Continue to control with diet and exercise.  Advised to avoid processed sugar and simple carbohydrate.    5. DELMA (obstructive sleep apnea)  - Stable.  Continue CPAP machine and follow with pulmonology sleep medicine as scheduled.      Followup: Return in about 6 months (around 4/2/2019), or if symptoms worsen or fail to improve, for Hyperlipidemia, Prediabetes, BPH, Lab review.      Please note that this dictation was created using voice recognition software. I have made every reasonable attempt to correct obvious errors, but I expect that there may have unintended errors in text, spelling, punctuation, or grammar that I did not discover.

## 2018-11-05 ENCOUNTER — APPOINTMENT (OUTPATIENT)
Dept: SLEEP MEDICINE | Facility: MEDICAL CENTER | Age: 68
End: 2018-11-05
Payer: MEDICARE

## 2019-01-30 ENCOUNTER — HOSPITAL ENCOUNTER (EMERGENCY)
Facility: MEDICAL CENTER | Age: 69
End: 2019-01-30
Attending: EMERGENCY MEDICINE
Payer: MEDICARE

## 2019-01-30 VITALS
SYSTOLIC BLOOD PRESSURE: 144 MMHG | DIASTOLIC BLOOD PRESSURE: 74 MMHG | TEMPERATURE: 97.7 F | HEART RATE: 62 BPM | RESPIRATION RATE: 16 BRPM | BODY MASS INDEX: 27.43 KG/M2 | OXYGEN SATURATION: 94 % | HEIGHT: 69 IN | WEIGHT: 185.19 LBS

## 2019-01-30 DIAGNOSIS — Z23 NEED FOR TETANUS BOOSTER: ICD-10-CM

## 2019-01-30 DIAGNOSIS — S61.216A LACERATION OF RIGHT LITTLE FINGER WITHOUT DAMAGE TO NAIL, FOREIGN BODY PRESENCE UNSPECIFIED, INITIAL ENCOUNTER: ICD-10-CM

## 2019-01-30 PROCEDURE — 303485 HCHG DRESSING MEDIUM

## 2019-01-30 PROCEDURE — 99283 EMERGENCY DEPT VISIT LOW MDM: CPT

## 2019-01-30 NOTE — ED TRIAGE NOTES
Jamal Dee 68 y.o. male     Chief Complaint   Patient presents with   • Laceration     Right 5th digit.  Cut on stove doll in kitchen. Bleeding controlled in triage with bandage.  Wound not visualized.  Tetanus >10 years        Pt returned to lobby and educated on triage process.  Advised to notify RN with changes or concerns.

## 2019-01-30 NOTE — ED NOTES
Tetanus given and dressing applied per ERp order. Release with all follow-up and instructions for wound care.

## 2019-01-30 NOTE — ED PROVIDER NOTES
ED Provider Note    CHIEF COMPLAINT   Chief Complaint   Patient presents with   • Laceration     Right 5th digit.  Cut on stove doll in kitchen. Bleeding controlled in triage with bandage.  Wound not visualized.  Tetanus >10 years       HPI   Jamal Dee is a 68 y.o. male who presents To the emergency department with a chief complaint of a laceration to the right small finger.  The patient accidentally cut his finger on the doll of his of it at home.  He sustained a laceration.  He is worried that he may need suturing and therefore comes emergency department for evaluation.  He denies any numbness or weakness.  Wound is hemostatic with direct pressure.  He is not sure when he had his tetanus booster.    REVIEW OF SYSTEMS   See HPI for further details. All other systems are negative.     PAST MEDICAL HISTORY   Past Medical History:   Diagnosis Date   • Glaucoma    • Hyperlipidemia        FAMILY HISTORY  Family History   Problem Relation Age of Onset   • Heart Attack Father    • Cancer Sister 62        colon cancer   • Sleep Apnea Neg Hx        SOCIAL HISTORY  Social History     Social History   • Marital status:      Spouse name: N/A   • Number of children: N/A   • Years of education: N/A     Social History Main Topics   • Smoking status: Former Smoker     Packs/day: 1.50     Years: 15.00     Types: Cigarettes     Quit date: 1/28/1984   • Smokeless tobacco: Never Used      Comment: quit 1985   • Alcohol use 4.2 oz/week     7 Glasses of wine per week      Comment: 1 glass a night   • Drug use: No   • Sexual activity: Not Currently     Partners: Female     Other Topics Concern   • Not on file     Social History Narrative   • No narrative on file       SURGICAL HISTORY  Past Surgical History:   Procedure Laterality Date   • CATARACT PHACO WITH IOL  5/16/2013    Performed by Theodore Guzman M.D. at [facility]S   • CATARACT PHACO WITH IOL  5/2/2013    Performed by Theodore Guzman M.D. at  "[facility]       CURRENT MEDICATIONS   Home Medications    **Home medications have not yet been reviewed for this encounter**         ALLERGIES   Allergies   Allergen Reactions   • Other Environmental Unspecified     Goose down       PHYSICAL EXAM  VITAL SIGNS: /62   Pulse 74   Temp 36.6 °C (97.8 °F) (Temporal)   Resp 16   Ht 1.753 m (5' 9\")   Wt 84 kg (185 lb 3 oz)   SpO2 94%   BMI 27.35 kg/m²   Nursing note and vitals reviewed.  Constitutional: Well-developed and well-nourished. No distress.   HENT: Head is normocephalic and atraumatic. Oropharynx is clear and moist without exudate or erythema.   Eyes: Pupils are equal, round. Conjunctiva are normal.   Cardiovascular: Strong radial pulse. Capillary refill is less than 2 seconds distal to the point of injury.  Pulmonary/Chest: No respiratory distress. Chest wall is atraumatic.   Abdominal: Soft and non-tender. No distention. Atraumatic.    Musculoskeletal: Extremities exhibit normal range of motion there is tenderness along the ulnar aspect of the right small finger.  The patient has 2 parallel lacerations each measuring approximately 1 cm.  These are superficial.  No bone visible.  There is no foreign body.  Wound is hemostatic.. Bilateral lower extremities are atraumatic.  Neurological: Awake, alert and oriented to person, place, and time. No focal deficits noted. Strength and sensation are normal distal to the point of injury.  Skin: Skin is warm and dry. No rash.  Laceration as above  Psychiatric: Appropriate for clinical situation.    RADIOLOGY/PROCEDURES      COURSE & MEDICAL DECISION MAKING  Pertinent Labs & Imaging studies reviewed. (See chart for details)    Patient presents today with a laceration.  This is a fairly superficial laceration.  It is not gaping.  No exposed bone or tendon.  This should heal with local wound care.  Antibiotic ointment and dressing is applied.  The patient requires a tetanus booster.  This provided.  " Patient's discharged home in stable condition.    FINAL IMPRESSION  1. Laceration of right little finger without damage to nail, foreign body presence unspecified, initial encounter    2. Need for tetanus booster            Electronically signed by: Manish Hastings, 1/30/2019 12:17 PM

## 2019-02-12 ENCOUNTER — SLEEP CENTER VISIT (OUTPATIENT)
Dept: SLEEP MEDICINE | Facility: MEDICAL CENTER | Age: 69
End: 2019-02-12
Payer: MEDICARE

## 2019-02-12 VITALS
WEIGHT: 184 LBS | HEIGHT: 69 IN | RESPIRATION RATE: 16 BRPM | BODY MASS INDEX: 27.25 KG/M2 | SYSTOLIC BLOOD PRESSURE: 130 MMHG | HEART RATE: 66 BPM | OXYGEN SATURATION: 92 % | TEMPERATURE: 97 F | DIASTOLIC BLOOD PRESSURE: 74 MMHG

## 2019-02-12 DIAGNOSIS — G47.33 OSA (OBSTRUCTIVE SLEEP APNEA): ICD-10-CM

## 2019-02-12 PROCEDURE — 99213 OFFICE O/P EST LOW 20 MIN: CPT | Performed by: NURSE PRACTITIONER

## 2019-02-12 NOTE — PATIENT INSTRUCTIONS
Plan:    1) Increase CPAP to 8 CM H20. Order sent to his DME for new mask and supplies. He also states he was provided padding for his mask straps which have helped with comfort. Order to replace this as well.   2) Sleep hygiene discussed. Recommend keeping a set sleep/wake schedule. Logging enough hours of sleep. Limiting/Avoiding naps. No caffeine after noon and no heavy meals in the evening. Recommend daily exercise.   3) Patient is up to date on his Prevnar 13, Pneumovax 23 and Influenza vaccinations.   4) Follow up in 6 months with repeat compliance card download, sooner if needed.

## 2019-02-12 NOTE — PROGRESS NOTES
Chief Complaint   Patient presents with   • Apnea     Last Seen 7/23/18       HPI:  Jamal Dee is a 68 y.o. year old male here today for follow-up on his obstructive sleep apnea. Polysomnogram on 5/2/2016 which indicated evidence of severe obstructive sleep apnea with an AHI of 55.6 and a low 02 saturation of 68%. He was successfully titrated to CPAP at 10 CM H20 with a resultant AHI of 2.3 and a low 02 saturation of 90%. He was started on a CPAP of 10 CM H20. He felt the pressures were too high on that setting and he requested a decrease. He was empirically decreased to 8 CM H20, but continued to have tolerance issues. He was decreased to 6 CM. Repeat compliance card download at his last office visit indicated an AHI of 15.1 with an average use of 7.5 hours at night. His pressure was increased to 7 CM H20 at his last office visit. Repeat compliance card download today in the office indicates an AHI of 12.1 with an average use of 7.5 hours at night. He does have evidence of mask leaks.   He is tolerating the new pressure well. He states he is due for a new mask. He does note occasional mask leaks. He has a full face mask. He states when it is new he does not experience mask leaks. He denies significant daytime fatigue. He denies any morning headaches.       Past Medical History:   Diagnosis Date   • Glaucoma    • Hyperlipidemia        Past Surgical History:   Procedure Laterality Date   • CATARACT PHACO WITH IOL  5/16/2013    Performed by Theodore Guzman M.D. at SUR[facility]S   • CATARACT PHACO WITH IOL  5/2/2013    Performed by Theodore Guzman M.D. at [facility]       Family History   Problem Relation Age of Onset   • Heart Attack Father    • Cancer Sister 62        colon cancer   • Sleep Apnea Neg Hx        Social History     Social History   • Marital status:      Spouse name: N/A   • Number of children: N/A   • Years of education: N/A     Occupational History   • Not on  file.     Social History Main Topics   • Smoking status: Former Smoker     Packs/day: 1.50     Years: 15.00     Types: Cigarettes     Quit date: 1/28/1984   • Smokeless tobacco: Never Used      Comment: quit 1985   • Alcohol use 4.2 oz/week     7 Glasses of wine per week      Comment: 1 glass a night   • Drug use: No   • Sexual activity: Not Currently     Partners: Female     Other Topics Concern   • Not on file     Social History Narrative   • No narrative on file       ROS:  Constitutional: Denies fevers, chills, sweats, weight loss  Eyes: Denies glasses, vision loss, pain, drainage, double vision  Ears/Nose/Mouth/Throat: Denies rhinitis, nasal congestion, ear ache, difficulty hearing, sore throat, persistent hoarseness, decayed teeth/toothache  Cardiovascular: Denies chest pain, tightness, palpitations, swelling in feet/legs, fainting, difficulty breathing when laying down  Respiratory: Denies shortness of breath, cough, sputum, wheezing, painful breathing, coughing up blood  GI: Denies heartburn, difficulty swallowing, nausea, vomiting, abdominal pain, diarrhea, constipation  : Denies frequent urination, painful urination  Integumentary: Denies rashes, lumps or color changes  MSK: Denies painful joints, sore muscles, and back pain.   Neurological: Denies frequent headaches, dizziness, weakness  Sleep: See HPI       Current Outpatient Prescriptions   Medication Sig Dispense Refill   • dutasteride (AVODART) 0.5 MG capsule Take 1 Cap by mouth every day. 90 Cap 3   • atorvastatin (LIPITOR) 40 MG Tab Take 1 Tab by mouth every evening. 90 Tab 3   • timolol (TIMOPTIC) 0.5 % Solution INSTILL 1 DROP INTO RIGHT EYE EVERY MORNING  6   • bimatoprost (LUMIGAN) 0.01 % Solution Place 1 Drop in both eyes every bedtime. 1 DROP IN EACH EYE AT BED TIME.     • tamsulosin (FLOMAX) 0.4 MG capsule Take 1 Cap by mouth ONE-HALF HOUR AFTER BREAKFAST. (Patient not taking: Reported on 2/12/2019) 90 Cap 3     No current  "facility-administered medications for this visit.        Allergies   Allergen Reactions   • Other Environmental Unspecified     Goose down       Blood pressure 130/74, pulse 66, temperature 36.1 °C (97 °F), temperature source Temporal, resp. rate 16, height 1.753 m (5' 9\"), weight 83.5 kg (184 lb), SpO2 92 %.    PE:   Appearance: Well developed, well nourished, no acute distress  Eyes: PERRL, EOM intact, sclera white, conjunctiva moist  Ears: no lesions or deformities  Hearing: grossly intact  Nose: no lesions or deformities  Oropharynx: tongue normal, posterior pharynx without erythema or exudate  Mallampati Classification: Class 4   Neck: supple, trachea midline, no masses   Respiratory effort: no intercostal retractions or use of accessory muscles  Lung auscultation: no rales, rhonchi or wheezes  Heart auscultation: no murmur rub or gallop  Extremities: no cyanosis or edema  Abdomen: soft ,non tender, no masses  Gait and Station: normal  Digits and nails: no clubbing, cyanosis, petechiae or nodes.  Cranial nerves: grossly intact  Skin: no rashes, lesions or ulcers noted  Orientation: Oriented to time, person and place  Mood and affect: mood and affect appropriate, normal interaction with examiner  Judgement: Intact          Assessment:    1. DELMA (obstructive sleep apnea)  DME Mask and Supplies    DME Other    DME Other         Plan:    1) Increase CPAP to 8 CM H20. Order sent to his DME for new mask and supplies. He also states he was provided padding for his mask straps which have helped with comfort. Order to replace this as well.   2) Sleep hygiene discussed. Recommend keeping a set sleep/wake schedule. Logging enough hours of sleep. Limiting/Avoiding naps. No caffeine after noon and no heavy meals in the evening. Recommend daily exercise.   3) Patient is up to date on his Prevnar 13, Pneumovax 23 and Influenza vaccinations.   4) Follow up in 6 months with repeat compliance card download, sooner if needed. "

## 2019-03-19 ENCOUNTER — TELEPHONE (OUTPATIENT)
Dept: MEDICAL GROUP | Age: 69
End: 2019-03-19

## 2019-03-19 ENCOUNTER — HOSPITAL ENCOUNTER (OUTPATIENT)
Dept: LAB | Facility: MEDICAL CENTER | Age: 69
End: 2019-03-19
Attending: INTERNAL MEDICINE
Payer: MEDICARE

## 2019-03-19 DIAGNOSIS — E78.2 MIXED HYPERLIPIDEMIA: ICD-10-CM

## 2019-03-19 DIAGNOSIS — R73.01 IFG (IMPAIRED FASTING GLUCOSE): ICD-10-CM

## 2019-03-19 LAB
ALBUMIN SERPL BCP-MCNC: 4 G/DL (ref 3.2–4.9)
ALBUMIN/GLOB SERPL: 1.4 G/DL
ALP SERPL-CCNC: 56 U/L (ref 30–99)
ALT SERPL-CCNC: 32 U/L (ref 2–50)
ANION GAP SERPL CALC-SCNC: 7 MMOL/L (ref 0–11.9)
AST SERPL-CCNC: 25 U/L (ref 12–45)
BILIRUB SERPL-MCNC: 0.7 MG/DL (ref 0.1–1.5)
BUN SERPL-MCNC: 16 MG/DL (ref 8–22)
CALCIUM SERPL-MCNC: 8.5 MG/DL (ref 8.5–10.5)
CHLORIDE SERPL-SCNC: 111 MMOL/L (ref 96–112)
CHOLEST SERPL-MCNC: 113 MG/DL (ref 100–199)
CO2 SERPL-SCNC: 26 MMOL/L (ref 20–33)
CREAT SERPL-MCNC: 0.83 MG/DL (ref 0.5–1.4)
EST. AVERAGE GLUCOSE BLD GHB EST-MCNC: 131 MG/DL
FASTING STATUS PATIENT QL REPORTED: NORMAL
GLOBULIN SER CALC-MCNC: 2.8 G/DL (ref 1.9–3.5)
GLUCOSE SERPL-MCNC: 100 MG/DL (ref 65–99)
HBA1C MFR BLD: 6.2 % (ref 0–5.6)
HDLC SERPL-MCNC: 44 MG/DL
LDLC SERPL CALC-MCNC: 45 MG/DL
POTASSIUM SERPL-SCNC: 3.8 MMOL/L (ref 3.6–5.5)
PROT SERPL-MCNC: 6.8 G/DL (ref 6–8.2)
SODIUM SERPL-SCNC: 144 MMOL/L (ref 135–145)
TRIGL SERPL-MCNC: 122 MG/DL (ref 0–149)

## 2019-03-19 PROCEDURE — 80053 COMPREHEN METABOLIC PANEL: CPT

## 2019-03-19 PROCEDURE — 36415 COLL VENOUS BLD VENIPUNCTURE: CPT

## 2019-03-19 PROCEDURE — 80061 LIPID PANEL: CPT

## 2019-03-19 PROCEDURE — 83036 HEMOGLOBIN GLYCOSYLATED A1C: CPT

## 2019-03-19 NOTE — TELEPHONE ENCOUNTER
ESTABLISHED PATIENT PRE-VISIT PLANNING     Patient was NOT contacted to complete PVP.     Note: Patient will not be contacted if there is no indication to call.     1.  Reviewed notes from the last few office visits within the medical group: Yes    2.  If any orders were placed at last visit or intended to be done for this visit (i.e. 6 mos follow-up), do we have Results/Consult Notes?        •  Labs - Labs ordered, but not to be completed until 3/19/19.   Note: If patient appointment is for lab review and patient did not complete labs, check with provider if OK to reschedule patient until labs completed.       •  Imaging - Imaging was not ordered at last office visit.       •  Referrals - No referrals were ordered at last office visit.    3. Is this appointment scheduled as a Hospital Follow-Up? No    4.  Immunizations were updated in Epic using WebIZ?: Epic matches WebIZ       •  Web Iz Recommendations: SHINGRIX (Shingles)    5.  Patient is due for the following Health Maintenance Topics:   Health Maintenance Due   Topic Date Due   • IMM ZOSTER VACCINES (2 of 3) 03/09/2015           6. Orders for overdue Health Maintenance topics pended in Pre-Charting? N\A    7.  AHA (MDX) form printed for Provider? YES    8.  Patient was NOT informed to arrive 15 min prior to their scheduled appointment and bring in their medication bottles.

## 2019-03-26 ENCOUNTER — OFFICE VISIT (OUTPATIENT)
Dept: MEDICAL GROUP | Age: 69
End: 2019-03-26
Payer: MEDICARE

## 2019-03-26 VITALS
HEART RATE: 78 BPM | HEIGHT: 66 IN | OXYGEN SATURATION: 90 % | SYSTOLIC BLOOD PRESSURE: 120 MMHG | DIASTOLIC BLOOD PRESSURE: 82 MMHG | TEMPERATURE: 96.8 F | BODY MASS INDEX: 29.8 KG/M2 | WEIGHT: 185.4 LBS

## 2019-03-26 DIAGNOSIS — N40.1 BENIGN PROSTATIC HYPERPLASIA WITH URINARY RETENTION: ICD-10-CM

## 2019-03-26 DIAGNOSIS — A60.00 RECURRENT GENITAL HERPES: ICD-10-CM

## 2019-03-26 DIAGNOSIS — E78.2 MIXED HYPERLIPIDEMIA: ICD-10-CM

## 2019-03-26 DIAGNOSIS — R73.01 IFG (IMPAIRED FASTING GLUCOSE): ICD-10-CM

## 2019-03-26 DIAGNOSIS — R73.03 PREDIABETES: ICD-10-CM

## 2019-03-26 DIAGNOSIS — R33.8 BENIGN PROSTATIC HYPERPLASIA WITH URINARY RETENTION: ICD-10-CM

## 2019-03-26 DIAGNOSIS — J30.89 CHRONIC NONSEASONAL ALLERGIC RHINITIS DUE TO POLLEN: ICD-10-CM

## 2019-03-26 PROCEDURE — 99214 OFFICE O/P EST MOD 30 MIN: CPT | Performed by: INTERNAL MEDICINE

## 2019-03-26 PROCEDURE — 8041 PR SCP AHA: Performed by: INTERNAL MEDICINE

## 2019-03-26 RX ORDER — FAMCICLOVIR 125 MG/1
125 TABLET ORAL 2 TIMES DAILY
Qty: 60 TAB | Refills: 3 | Status: SHIPPED | OUTPATIENT
Start: 2019-03-26 | End: 2021-06-17 | Stop reason: SDUPTHER

## 2019-03-26 ASSESSMENT — PATIENT HEALTH QUESTIONNAIRE - PHQ9: CLINICAL INTERPRETATION OF PHQ2 SCORE: 0

## 2019-03-26 NOTE — NON-PROVIDER
Annual Health Assessment Questions:    1.  Are you currently engaging in any exercise or physical activity? Yes    2.  How would you describe your mood or emotional well-being today? good    3.  Have you had any falls in the last year? No    4.  Have you noticed any problems with your balance or had difficulty walking? No    5.  In the last six months have you experienced any leakage of urine? Yes    6. DPA/Advanced Directive: Patient has Advanced Directive, but it is not on file. Instructed to bring in a copy to scan into their chart.

## 2019-03-26 NOTE — PROGRESS NOTES
Subjective:   Jamal Dee is a 68 y.o. male here today for evaluation and management of:    1. Prediabetes/IFG (impaired fasting glucose)  Recent A1C has increased to 6.2. He is not on diabetic medications at this time. He has not been watching his diet, but does not eat sugary foods. He does drink one glass of wine daily. He is not exercising regularly.     I discussed the blood test with the patient in clinic today  Results for JAMAL DEE (MRN [id number]) as of 3/26/2019 10:38   Ref. Range 6/1/2017 08:26 3/13/2018 10:39 3/19/2019 08:52   Glycohemoglobin Latest Ref Range: 0.0 - 5.6 % 5.6 5.7 (H) 6.2 (H)   Estim. Avg Glu Latest Units: mg/dL 114 117 131     2. Mixed hyperlipidemia  Chronic problem and stable on Atorvastatin 40mg once daily. Recent cholesterol levels have normalized. He has been tolerating medications and denies any side effects such as myalgia.     I discussed the blood test with the patient in clinic today  Results for JAMAL DEE (MRN [id number]) as of 3/26/2019 10:38   Ref. Range 6/1/2017 08:26 3/13/2018 10:39 3/19/2019 08:52   Cholesterol,Tot Latest Ref Range: 100 - 199 mg/dL 141 110 113   Triglycerides Latest Ref Range: 0 - 149 mg/dL 147 110 122   HDL Latest Ref Range: >=40 mg/dL 45 39 (A) 44   LDL Latest Ref Range: <100 mg/dL 67 49 45     3. BPH (benign prostatic hypertrophy) with urinary retention  Chronic problem. He is taking Tamsulosin 0.4mg once daily and Dutasteride 0.5mg for treatment. He is not monitoring his blood pressure at home. He continues to report of minimal urinary retention in the morning and will have to urinate twice in the morning, but denies any other urinary symptoms. He does drink 4-5 cups of black coffee daily.     4. Chronic nonseasonal allergic rhinitis due to pollen  Chronic problem. He reports of a recent incident where he was exposed to an allergen, goose's jose and developed symptoms of dizziness, congestion, and sinus discomfort. He has  used Flonase and a nasal rinse previously with success, but did not use it during that incident.     5. Recurrent genital herpes  Chronic problem. He has been taking Famciclovir 125mg since 2009 and has been tolerating it without side effects. He has a breakout only once a year and takes 2 tablets a day for 4-5 days with successful treatment of symptoms. He would like a refill.       Annual Health Assessment Questions:     1.  Are you currently engaging in any exercise or physical activity? Yes     2.  How would you describe your mood or emotional well-being today? good     3.  Have you had any falls in the last year? No     4.  Have you noticed any problems with your balance or had difficulty walking? No     5.  In the last six months have you experienced any leakage of urine? Yes     6. DPA/Advanced Directive: Patient has Advanced Directive, but it is not on file. Instructed to bring in a copy to scan into their chart.    Current medicines (including changes today)  Current Outpatient Prescriptions   Medication Sig Dispense Refill   • famciclovir (FAMVIR) 125 MG Tab Take 1 Tab by mouth 2 Times a Day. Take for 5 days if symptoms flare up. 60 Tab 3   • tamsulosin (FLOMAX) 0.4 MG capsule Take 1 Cap by mouth ONE-HALF HOUR AFTER BREAKFAST. 90 Cap 3   • dutasteride (AVODART) 0.5 MG capsule Take 1 Cap by mouth every day. 90 Cap 3   • atorvastatin (LIPITOR) 40 MG Tab Take 1 Tab by mouth every evening. 90 Tab 3   • timolol (TIMOPTIC) 0.5 % Solution INSTILL 1 DROP INTO RIGHT EYE EVERY MORNING  6   • bimatoprost (LUMIGAN) 0.01 % Solution Place 1 Drop in both eyes every bedtime. 1 DROP IN EACH EYE AT BED TIME.       No current facility-administered medications for this visit.      He  has a past medical history of Glaucoma and Hyperlipidemia.    ROS   No chest pain, no shortness of breath, no abdominal pain       Objective:     Blood pressure 120/82, pulse 78, temperature 36 °C (96.8 °F), temperature source Temporal, height  "1.676 m (5' 6\"), weight 84.1 kg (185 lb 6.4 oz), SpO2 90 %. Body mass index is 29.92 kg/m².   Physical Exam:  General: Alert, oriented and no acute distress.  Eye contact is good, speech goal directed, affect calm  HEENT: conjunctiva non-injected, sclera non-icteric.  Oral mucous membranes pink and moist with no lesions.  Pinna normal.   Lungs: Normal respiratory effort, clear to auscultation bilaterally with good excursion.  CV: regular rate and rhythm. No murmurs.   Abdomen: soft, non distended, nontender, Bowel sound normal.  Ext: no edema, color normal, vascularity normal, temperature normal    Assessment and Plan:   The following treatment plan was discussed     1. Prediabetes  - Recent A1C was elevated to 6.2. He is not taking medications at this time.  - I advised to make diet changes to improve the glucose levels.  Advised to avoid sweets, decrease the carbs, decrease alcohol and coffee consumption, exercise regularly and keep a healthy weight.   - Comp Metabolic Panel; Future  - HEMOGLOBIN A1C; Future    2. Mixed hyperlipidemia  - Well-controlled. Continue current regimens with Atorvastatin 40mg once daily. Recheck lab 1-2 weeks before next follow up visit.  - Reviewed the risks and benefits of treatment and potential side effects of medication.  - Advised to eat low fat, low carbohydrate and high fiber diet as well as do cardio physical exercise regularly.    - Comp Metabolic Panel; Future  - Lipid Profile; Future    3. BPH (benign prostatic hypertrophy) with urinary retention  - Currently taking Tamsulosin 0.4mg once daily and Dutasteride 0.5mg, but continues to have minimal urinary retention in the morning. He does consume 4-5 cups of black coffee daily, so I recommended he decrease the amount of coffee intake as caffeine can play a role in urinary retention. Also advised he hydrate with plenty of fluids.  - Recommended he occasionally monitor his blood pressure at home   - CBC WITH DIFFERENTIAL; " Future  - Comp Metabolic Panel; Future    4. Chronic nonseasonal allergic rhinitis due to pollen  - Discussed at length to rinse sinuses with over the counter nasal wash or Netti pot once or twice a day and then use flonase nasal spray once or twice a day after rinsing sinuses  - Advised to try nasal steam therapy.   - I also recommended he take Claritin 10mg once a day for 5-7 days.   - He is advised to return to clinic if symptoms do not improve with above treatment.  - CBC WITH DIFFERENTIAL; Future  - Comp Metabolic Panel; Future    5. Recurrent genital herpes  - He has been taking Famciclovir for treatment since 2009 and only has one outbreak a year. Since this is working well for him, I will refill his prescription. I recommended he only take  this for 5-7 days when symptoms occur and should avoid taking this medication daily if symptoms resolve. Advised he continue to hydrate with plenty of fluids.   - famciclovir (FAMVIR) 125 MG Tab; Take 1 Tab by mouth 2 Times a Day. Take for 5 days if symptoms flare up.  Dispense: 60 Tab; Refill: 3    6. IFG (impaired fasting glucose)  - Advised to eat low fat, low carbohydrate and high fiber diet as well as do cardio physical exercise regularly.  - Comp Metabolic Panel; Future  - HEMOGLOBIN A1C; Future    7. Health Maintenance   - Shingrex vaccine is recommended, however will not be given today due to shortage.     AHA and MDX form are reviewed and completed in clinic today.  Appropriate discussion and counseling are provided based on annual health assessment questions.      Followup: Return in about 7 months (around 10/26/2019), or if symptoms worsen or fail to improve, for Hyperlipidemia, BPH, allergic rhinitis, Prediabetes, Lab review.      Please note that this dictation was created using voice recognition software. I have made every reasonable attempt to correct obvious errors, but I expect that there may have unintended errors in text, spelling, punctuation, or  grammar that I did not discover.    I, Shandra Gonsalves (Scribe), am scribing for, and in the presence of, Maya Gage M.D..    Electronically signed by: Shandra Gonsalves (Ros[place]), 3/26/2019    I, Maya Gage M.D., personally performed the services described in this documentation, as scribed by Shandra Gonsalves in my presence, and it is both accurate and complete.

## 2019-04-10 ENCOUNTER — OFFICE VISIT (OUTPATIENT)
Dept: MEDICAL GROUP | Age: 69
End: 2019-04-10
Payer: MEDICARE

## 2019-04-10 VITALS
TEMPERATURE: 96.8 F | WEIGHT: 184.2 LBS | HEIGHT: 66 IN | SYSTOLIC BLOOD PRESSURE: 100 MMHG | DIASTOLIC BLOOD PRESSURE: 68 MMHG | HEART RATE: 64 BPM | OXYGEN SATURATION: 92 % | BODY MASS INDEX: 29.6 KG/M2

## 2019-04-10 DIAGNOSIS — H61.22 IMPACTED CERUMEN OF LEFT EAR: ICD-10-CM

## 2019-04-10 DIAGNOSIS — J30.89 CHRONIC NONSEASONAL ALLERGIC RHINITIS DUE TO POLLEN: ICD-10-CM

## 2019-04-10 DIAGNOSIS — R33.8 BENIGN PROSTATIC HYPERPLASIA WITH URINARY RETENTION: ICD-10-CM

## 2019-04-10 DIAGNOSIS — R73.03 PREDIABETES: ICD-10-CM

## 2019-04-10 DIAGNOSIS — H66.92 ACUTE EAR INFECTION, LEFT: ICD-10-CM

## 2019-04-10 DIAGNOSIS — E78.2 MIXED HYPERLIPIDEMIA: ICD-10-CM

## 2019-04-10 DIAGNOSIS — N40.1 BENIGN PROSTATIC HYPERPLASIA WITH URINARY RETENTION: ICD-10-CM

## 2019-04-10 PROCEDURE — 99214 OFFICE O/P EST MOD 30 MIN: CPT | Performed by: INTERNAL MEDICINE

## 2019-04-10 RX ORDER — AZITHROMYCIN 250 MG/1
TABLET, FILM COATED ORAL
Qty: 6 TAB | Refills: 0 | Status: SHIPPED | OUTPATIENT
Start: 2019-04-10 | End: 2019-04-15

## 2019-04-10 RX ORDER — FLUTICASONE PROPIONATE 50 MCG
2 SPRAY, SUSPENSION (ML) NASAL DAILY
Qty: 16 G | Refills: 3 | Status: SHIPPED | OUTPATIENT
Start: 2019-04-10 | End: 2019-08-01

## 2019-04-10 ASSESSMENT — PAIN SCALES - GENERAL: PAINLEVEL: NO PAIN

## 2019-04-10 NOTE — PROGRESS NOTES
Subjective:   Jamal Dee is a 68 y.o. male here today for evaluation and management of:      Chronic nonseasonal allergic rhinitis due to pollen  Chronic with acute exacerbation. Patient reports acute dizziness and congestion from allergies. 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 He recently visited his daughter and slept on a goose down feather couch. Since, he has had dizziness and allergy symptoms including congestion and rhinorrhea. He reports symptoms are gradually improving and he has been using a Neti pot and Claritin.     Impacted cerumen of left ear  Acute. He does report a sensation of left ear blockage, but denies true ear pain, discharge, or hearing loss. Symptoms are worse in left ear than right. Denies coughing, sneezing, or wheezing.    Prediabetes  Recent A1C from 3/19/19 is 6.2. He is not on diabetic medications at this time and is considered prediabetic. He has not been watching his diet, but does not eat sugary foods. He does drink one glass of wine daily. He is not exercising regularly. I discussed the blood test with the patient in clinic today.    Results for JAMAL DEE (MRN [id number]) as of 4/10/2019 09:16   Ref. Range 3/19/2019 08:52   Glycohemoglobin Latest Ref Range: 0.0 - 5.6 % 6.2 (H)       Mixed hyperlipidemia  Chronic and stable on Atorvastatin 40mg once daily. Recent cholesterol levels have normalized. He has been tolerating medications and denies any side effects such as myalgia. I discussed the blood test with the patient in clinic today.    Results for JAMAL DEE (MRN [id number]) as of 4/10/2019 09:16   Ref. Range 3/19/2019 08:52   Cholesterol,Tot Latest Ref Range: 100 - 199 mg/dL 113   Triglycerides Latest Ref Range: 0 - 149 mg/dL 122   HDL Latest Ref Range: >=40 mg/dL 44   LDL Latest Ref Range: <100 mg/dL 45       BPH (benign prostatic hypertrophy) with urinary retention  Chronic. He reports he sporadically uses Flomax in addition to  "Tamsulosin 0.4 mg QD and Dutasteride 0.5 mg QD. He reports he is staying hydrated and denies any recent changes.        Current medicines (including changes today)  Current Outpatient Prescriptions   Medication Sig Dispense Refill   • fluticasone (FLONASE) 50 MCG/ACT nasal spray Spray 2 Sprays in nose every day. 16 g 3   • azithromycin (ZITHROMAX) 250 MG Tab 2 tabs by mouth day 1, 1 tab by mouth days 2-5 6 Tab 0   • famciclovir (FAMVIR) 125 MG Tab Take 1 Tab by mouth 2 Times a Day. Take for 5 days if symptoms flare up. 60 Tab 3   • tamsulosin (FLOMAX) 0.4 MG capsule Take 1 Cap by mouth ONE-HALF HOUR AFTER BREAKFAST. 90 Cap 3   • dutasteride (AVODART) 0.5 MG capsule Take 1 Cap by mouth every day. 90 Cap 3   • atorvastatin (LIPITOR) 40 MG Tab Take 1 Tab by mouth every evening. 90 Tab 3   • timolol (TIMOPTIC) 0.5 % Solution INSTILL 1 DROP INTO RIGHT EYE EVERY MORNING  6   • bimatoprost (LUMIGAN) 0.01 % Solution Place 1 Drop in both eyes every bedtime. 1 DROP IN EACH EYE AT BED TIME.       No current facility-administered medications for this visit.      He  has a past medical history of Glaucoma and Hyperlipidemia.    ROS   No chest pain, no shortness of breath, no abdominal pain       Objective:     /68 (BP Location: Right arm, Patient Position: Sitting, BP Cuff Size: Adult)   Pulse 64   Temp 36 °C (96.8 °F) (Temporal)   Ht 1.676 m (5' 6\")   Wt 83.6 kg (184 lb 3.2 oz)   SpO2 92%  Body mass index is 29.73 kg/m².   Physical Exam:  General: Alert, oriented and no acute distress.  Eye contact is good, speech goal directed, affect calm  HEENT: conjunctiva non-injected, sclera non-icteric.  Oral mucous membranes pink and moist with no lesions.  Pinna normal. Right TM pearly gray. Left ear cerumen impaction with complete obstruction to ear canal. Cannot visualize TM beyond. Examined after lavage: He has moderate erythema of the tympanic membrane and mild serous effusion on left ear.   Neck No supraclavicular, " submandibular, submental lymphadenopathy or masses in the neck or supraclavicular regions.  Lungs: Normal respiratory effort, clear to auscultation bilaterally with good excursion.  CV: regular rate and rhythm. No murmurs. No carotid bruits.  Abdomen: soft, non distended, nontender, Bowel sound normal.  Ext: no edema, color normal, vascularity normal, temperature normal        Assessment and Plan:   The following treatment plan was discussed     1. Chronic nonseasonal allergic rhinitis due to pollen  - I recommended he use a Neti pot to rinse sinuses 2 times daily, followed by  a spray of Flonase in each nostril. Additionally, he may use nasal steam therapy to help alleviate congestion. He may continue to take Claritin 1 tablet once a day, or may try Allegra.   - fluticasone (FLONASE) 50 MCG/ACT nasal spray; Spray 2 Sprays in nose every day.  Dispense: 16 g; Refill: 3    2. Impacted cerumen of left ear  - Left ear lavage completed in office by Nivia medical assistant today.  Patient tolerated ear lavage.  He denied pain.  Patient advised on proper care of ears to prevent future impactions, such as no use of q tips and covering his ears with cotton when he showers.    3. Prediabetes  - He is not taking medications at this time.  - I advised to make diet changes to improve the glucose levels.  Advised to avoid sweets, decrease the carbs, decrease alcohol and coffee consumption, exercise regularly and keep a healthy weight.  - fluticasone (FLONASE) 50 MCG/ACT nasal spray; Spray 2 Sprays in nose every day.  Dispense: 16 g; Refill: 3    4. Mixed hyperlipidemia  - Well-controlled. Continue current regimens with Atorvastatin 40mg once daily. Recheck lab 1-2 weeks before next follow up visit.  - Reviewed the risks and benefits of treatment and potential side effects of medication.  - Advised to eat low fat, low carbohydrate and high fiber diet as well as do cardio physical exercise regularly.     5. BPH (benign prostatic  hypertrophy) with urinary retention  - Currently taking Tamsulosin 0.4mg once daily and Dutasteride 0.5mg. Plan to continue as is well controlled.    6. Acute ear infection, left  - After ear lavage, I reexamined his left ear.  He has moderate erythema of the tympanic membrane and mild serous effusion on left ear.  I discussed with patient to try a short course of antibiotic to cover possible infection.  I prescribed Z-Adonay to take 2 tablets for first day and 1 tablet from day 2 to day 5.  I discussed possible side effects of Z-Adonay with patient.  He is advised to return back to clinic if he has any worsening symptoms or symptoms do not improve with above treatment.  Patient understands and agrees with the plan.  - azithromycin (ZITHROMAX) 250 MG Tab; 2 tabs by mouth day 1, 1 tab by mouth days 2-5  Dispense: 6 Tab; Refill: 0    Health Maintenance reviewed and up to date.    Follow up: Return in about 6 months (around 10/10/2019), or if symptoms worsen or fail to improve, for Hyperlipidemia, Prediabetes, BPH, Lab review.      IMari (Scribe), am scribing for, and in the presence of, Maya Gage M.D..    Electronically signed by: Mari Baer ([place]), 4/10/2019    I, Maya Gage M.D., personally performed the services described in this documentation, as scribed by Mari Baer in my presence, and it is both accurate and complete.

## 2019-04-27 DIAGNOSIS — N40.1 BENIGN PROSTATIC HYPERPLASIA WITH URINARY RETENTION: ICD-10-CM

## 2019-04-27 DIAGNOSIS — R33.8 BENIGN PROSTATIC HYPERPLASIA WITH URINARY RETENTION: ICD-10-CM

## 2019-04-27 DIAGNOSIS — E78.2 MIXED HYPERLIPIDEMIA: ICD-10-CM

## 2019-04-29 RX ORDER — ATORVASTATIN CALCIUM 40 MG/1
TABLET, FILM COATED ORAL
Qty: 100 TAB | Refills: 3 | Status: SHIPPED | OUTPATIENT
Start: 2019-04-29 | End: 2020-02-18 | Stop reason: SDUPTHER

## 2019-04-29 RX ORDER — DUTASTERIDE 0.5 MG/1
CAPSULE, LIQUID FILLED ORAL
Qty: 100 CAP | Refills: 3 | Status: SHIPPED | OUTPATIENT
Start: 2019-04-29 | End: 2020-02-18 | Stop reason: SDUPTHER

## 2019-04-29 RX ORDER — TAMSULOSIN HYDROCHLORIDE 0.4 MG/1
CAPSULE ORAL
Qty: 100 CAP | Refills: 3 | Status: SHIPPED | OUTPATIENT
Start: 2019-04-29 | End: 2020-02-18 | Stop reason: SDUPTHER

## 2019-06-18 ENCOUNTER — PATIENT OUTREACH (OUTPATIENT)
Dept: HEALTH INFORMATION MANAGEMENT | Facility: OTHER | Age: 69
End: 2019-06-18

## 2019-06-18 NOTE — PROGRESS NOTES
Outcome: Left voicemail/ message    Please transfer to [facility]  [phone] when patient returns call.     WebIZ Checked & Epic Updated:  yes     [facility] Verified: yes     Attempt # 1

## 2019-07-25 NOTE — PROGRESS NOTES
Outcome: Left voicemail/ message    Please transfer to [facility]  [phone] when patient returns call.     [facility] Verified: yes     Attempt # 2

## 2019-08-01 ENCOUNTER — SLEEP CENTER VISIT (OUTPATIENT)
Dept: SLEEP MEDICINE | Facility: MEDICAL CENTER | Age: 69
End: 2019-08-01
Payer: MEDICARE

## 2019-08-01 VITALS
DIASTOLIC BLOOD PRESSURE: 70 MMHG | SYSTOLIC BLOOD PRESSURE: 102 MMHG | OXYGEN SATURATION: 96 % | HEIGHT: 69 IN | RESPIRATION RATE: 16 BRPM | BODY MASS INDEX: 26.81 KG/M2 | WEIGHT: 181 LBS | HEART RATE: 75 BPM

## 2019-08-01 DIAGNOSIS — G47.33 OSA (OBSTRUCTIVE SLEEP APNEA): ICD-10-CM

## 2019-08-01 PROCEDURE — 99213 OFFICE O/P EST LOW 20 MIN: CPT | Performed by: NURSE PRACTITIONER

## 2019-08-01 ASSESSMENT — ENCOUNTER SYMPTOMS
RESPIRATORY NEGATIVE: 1
GASTROINTESTINAL NEGATIVE: 1
BRUISES/BLEEDS EASILY: 0
CONSTITUTIONAL NEGATIVE: 1
MUSCULOSKELETAL NEGATIVE: 1
NEUROLOGICAL NEGATIVE: 1
EYE PAIN: 0
EYE DISCHARGE: 0
CARDIOVASCULAR NEGATIVE: 1
PSYCHIATRIC NEGATIVE: 1

## 2019-08-01 NOTE — PROGRESS NOTES
Chief Complaint   Patient presents with   • Apnea     CPAP to 8 CM H20         HPI: This patient is a 68 y.o. male, who presents for  Six-month follow-up of obstructive sleep apnea.     PSG May 2, 2016 indicates severe obstructive sleep apnea with an AHI of 55.6, minimum oxygen saturation of 68 %.  He was successfully titrated to CPAP 10 cm H2O but has had a difficult time tolerating pressure.  At the lowest he was dropped to CPAP of 6 but AHI was significantly elevated.  He is currently using CPAP 8 cm H2O.  Compliance download over the past 30 days indicates 100 % compliance, average use of 8 hours per night, AHI of 7.3. Patient reports that a fitting from therapy.  He is tolerating pressure well.  He denies EDS or a.m. headache.  He has been getting supplies regularly.      Past Medical History:   Diagnosis Date   • Glaucoma    • Hyperlipidemia        Social History     Tobacco Use   • Smoking status: Former Smoker     Packs/day: 1.50     Years: 15.00     Pack years: 22.50     Types: Cigarettes     Last attempt to quit: 1984     Years since quittin.5   • Smokeless tobacco: Never Used   • Tobacco comment: quit    Substance Use Topics   • Alcohol use: Yes     Alcohol/week: 4.2 oz     Types: 7 Glasses of wine per week     Comment: 1 glass a night   • Drug use: No       Family History   Problem Relation Age of Onset   • Heart Attack Father    • Cancer Sister 62        colon cancer   • Sleep Apnea Neg Hx        Immunization History   Administered Date(s) Administered   • Hepatitis B Vaccine Recombivax (Adol/Adult) 2018, 2018   • Influenza Vaccine Adult HD 2017, 2018   • Pneumococcal Conjugate Vaccine (Prevnar/PCV-13) 2017   • Pneumococcal polysaccharide vaccine (PPSV-23) 2018   • Tdap Vaccine 2017   • Zoster Vaccine Live (ZVL) (Zostavax) 2015       Current medications as of today   Current Outpatient Medications   Medication Sig Dispense Refill   •  "atorvastatin (LIPITOR) 40 MG Tab TAKE 1 TABLET BY MOUTH EVERY EVENING 100 Tab 3   • dutasteride (AVODART) 0.5 MG capsule TAKE ONE CAPSULE BY MOUTH ONE TIME DAILY  100 Cap 3   • tamsulosin (FLOMAX) 0.4 MG capsule TAKE ONE CAPSULE BY MOUTH ONE TIME DAILY  1/2 HOUR AFTER BREAKFAST 100 Cap 3   • fluticasone (FLONASE) 50 MCG/ACT nasal spray Spray 2 Sprays in nose every day. 16 g 3   • famciclovir (FAMVIR) 125 MG Tab Take 1 Tab by mouth 2 Times a Day. Take for 5 days if symptoms flare up. 60 Tab 3   • timolol (TIMOPTIC) 0.5 % Solution INSTILL 1 DROP INTO RIGHT EYE EVERY MORNING  6   • bimatoprost (LUMIGAN) 0.01 % Solution Place 1 Drop in both eyes every bedtime. 1 DROP IN EACH EYE AT BED TIME.       No current facility-administered medications for this visit.        Allergies: Other environmental    /70   Pulse 75   Resp 16   Ht 1.74 m (5' 8.5\")   Wt 82.1 kg (181 lb)   SpO2 96%       Review of Systems   Constitutional: Negative.    HENT: Negative.    Eyes: Negative for pain and discharge.   Respiratory: Negative.    Cardiovascular: Negative.    Gastrointestinal: Negative.    Musculoskeletal: Negative.    Skin: Negative.    Neurological: Negative.    Endo/Heme/Allergies: Negative for environmental allergies. Does not bruise/bleed easily.   Psychiatric/Behavioral: Negative.        Physical Exam   Constitutional: He is oriented to person, place, and time and well-developed, well-nourished, and in no distress.   HENT:   Head: Normocephalic and atraumatic.   Eyes: Pupils are equal, round, and reactive to light.   Neck: Normal range of motion. Neck supple. No tracheal deviation present.   Cardiovascular: Normal rate, regular rhythm, normal heart sounds and intact distal pulses.   Pulmonary/Chest: Effort normal and breath sounds normal.   Musculoskeletal: Normal range of motion.   Neurological: He is alert and oriented to person, place, and time. Gait normal.   Skin: Skin is warm and dry.   Psychiatric: Mood, memory, " affect and judgment normal.       Diagnoses/Plan:    1. DELMA (obstructive sleep apnea)   Continue CPAP nightly, Clean mask & tubing weekly, Replace supplies as insurance will allow, RX for new supplies to DME.  He will follow-up in 1 year.  - DME Mask and Supplies      This dictation was created using voice recognition software. The accuracy of the dictation is limited to the abilities of the software. I expect there may be some errors of grammar and possibly content.

## 2019-11-07 ENCOUNTER — HOSPITAL ENCOUNTER (OUTPATIENT)
Dept: LAB | Facility: MEDICAL CENTER | Age: 69
End: 2019-11-07
Attending: INTERNAL MEDICINE
Payer: MEDICARE

## 2019-11-07 DIAGNOSIS — R73.01 IFG (IMPAIRED FASTING GLUCOSE): ICD-10-CM

## 2019-11-07 DIAGNOSIS — J30.89 CHRONIC NONSEASONAL ALLERGIC RHINITIS DUE TO POLLEN: ICD-10-CM

## 2019-11-07 DIAGNOSIS — E78.2 MIXED HYPERLIPIDEMIA: ICD-10-CM

## 2019-11-07 DIAGNOSIS — N40.1 BENIGN PROSTATIC HYPERPLASIA WITH URINARY RETENTION: ICD-10-CM

## 2019-11-07 DIAGNOSIS — R73.03 PREDIABETES: ICD-10-CM

## 2019-11-07 DIAGNOSIS — R33.8 BENIGN PROSTATIC HYPERPLASIA WITH URINARY RETENTION: ICD-10-CM

## 2019-11-07 LAB
ALBUMIN SERPL BCP-MCNC: 4 G/DL (ref 3.2–4.9)
ALBUMIN/GLOB SERPL: 1.5 G/DL
ALP SERPL-CCNC: 64 U/L (ref 30–99)
ALT SERPL-CCNC: 22 U/L (ref 2–50)
ANION GAP SERPL CALC-SCNC: 7 MMOL/L (ref 0–11.9)
AST SERPL-CCNC: 19 U/L (ref 12–45)
BASOPHILS # BLD AUTO: 1 % (ref 0–1.8)
BASOPHILS # BLD: 0.04 K/UL (ref 0–0.12)
BILIRUB SERPL-MCNC: 0.9 MG/DL (ref 0.1–1.5)
BUN SERPL-MCNC: 11 MG/DL (ref 8–22)
CALCIUM SERPL-MCNC: 8.8 MG/DL (ref 8.5–10.5)
CHLORIDE SERPL-SCNC: 108 MMOL/L (ref 96–112)
CHOLEST SERPL-MCNC: 153 MG/DL (ref 100–199)
CO2 SERPL-SCNC: 26 MMOL/L (ref 20–33)
CREAT SERPL-MCNC: 0.96 MG/DL (ref 0.5–1.4)
EOSINOPHIL # BLD AUTO: 0.17 K/UL (ref 0–0.51)
EOSINOPHIL NFR BLD: 4.1 % (ref 0–6.9)
ERYTHROCYTE [DISTWIDTH] IN BLOOD BY AUTOMATED COUNT: 46.6 FL (ref 35.9–50)
EST. AVERAGE GLUCOSE BLD GHB EST-MCNC: 126 MG/DL
FASTING STATUS PATIENT QL REPORTED: NORMAL
GLOBULIN SER CALC-MCNC: 2.6 G/DL (ref 1.9–3.5)
GLUCOSE SERPL-MCNC: 89 MG/DL (ref 65–99)
HBA1C MFR BLD: 6 % (ref 0–5.6)
HCT VFR BLD AUTO: 48.7 % (ref 42–52)
HDLC SERPL-MCNC: 45 MG/DL
HGB BLD-MCNC: 15.4 G/DL (ref 14–18)
IMM GRANULOCYTES # BLD AUTO: 0.01 K/UL (ref 0–0.11)
IMM GRANULOCYTES NFR BLD AUTO: 0.2 % (ref 0–0.9)
LDLC SERPL CALC-MCNC: 77 MG/DL
LYMPHOCYTES # BLD AUTO: 1.21 K/UL (ref 1–4.8)
LYMPHOCYTES NFR BLD: 29.2 % (ref 22–41)
MCH RBC QN AUTO: 30.1 PG (ref 27–33)
MCHC RBC AUTO-ENTMCNC: 31.6 G/DL (ref 33.7–35.3)
MCV RBC AUTO: 95.3 FL (ref 81.4–97.8)
MONOCYTES # BLD AUTO: 0.38 K/UL (ref 0–0.85)
MONOCYTES NFR BLD AUTO: 9.2 % (ref 0–13.4)
NEUTROPHILS # BLD AUTO: 2.34 K/UL (ref 1.82–7.42)
NEUTROPHILS NFR BLD: 56.3 % (ref 44–72)
NRBC # BLD AUTO: 0 K/UL
NRBC BLD-RTO: 0 /100 WBC
PLATELET # BLD AUTO: 175 K/UL (ref 164–446)
PMV BLD AUTO: 10.2 FL (ref 9–12.9)
POTASSIUM SERPL-SCNC: 3.7 MMOL/L (ref 3.6–5.5)
PROT SERPL-MCNC: 6.6 G/DL (ref 6–8.2)
RBC # BLD AUTO: 5.11 M/UL (ref 4.7–6.1)
SODIUM SERPL-SCNC: 141 MMOL/L (ref 135–145)
TRIGL SERPL-MCNC: 157 MG/DL (ref 0–149)
WBC # BLD AUTO: 4.2 K/UL (ref 4.8–10.8)

## 2019-11-07 PROCEDURE — 85025 COMPLETE CBC W/AUTO DIFF WBC: CPT

## 2019-11-07 PROCEDURE — 80061 LIPID PANEL: CPT

## 2019-11-07 PROCEDURE — 83036 HEMOGLOBIN GLYCOSYLATED A1C: CPT

## 2019-11-07 PROCEDURE — 36415 COLL VENOUS BLD VENIPUNCTURE: CPT

## 2019-11-07 PROCEDURE — 80053 COMPREHEN METABOLIC PANEL: CPT

## 2019-11-11 ENCOUNTER — TELEPHONE (OUTPATIENT)
Dept: MEDICAL GROUP | Age: 69
End: 2019-11-11

## 2019-11-11 NOTE — TELEPHONE ENCOUNTER
ESTABLISHED PATIENT PRE-VISIT PLANNING     Patient was NOT contacted to complete PVP.     Note: Patient will not be contacted if there is no indication to call.     1.  Reviewed notes from the last few office visits within the medical group: Yes    2.  If any orders were placed at last visit or intended to be done for this visit (i.e. 6 mos follow-up), do we have Results/Consult Notes?        •  Labs - Labs ordered, completed on 11/7/19 and results are in chart.   Note: If patient appointment is for lab review and patient did not complete labs, check with provider if OK to reschedule patient until labs completed.       •  Imaging - Imaging was not ordered at last office visit.       •  Referrals - No referrals were ordered at last office visit.    3. Is this appointment scheduled as a Hospital Follow-Up? No    4.  Immunizations were updated in Epic using WebIZ?: Epic matches WebIZ       •  Web Iz Recommendations: SHINGRIX (Shingles)    5.  Patient is due for the following Health Maintenance Topics:   Health Maintenance Due   Topic Date Due   • IMM ZOSTER VACCINES (2 of 3) 03/09/2015   • Annual Wellness Visit  09/19/2019           6. Orders for overdue Health Maintenance topics pended in Pre-Charting? N\A    7.  AHA (MDX) form printed for Provider? No, already completed    8.  Patient was NOT informed to arrive 15 min prior to their scheduled appointment and bring in their medication bottles.

## 2019-11-12 ENCOUNTER — OFFICE VISIT (OUTPATIENT)
Dept: MEDICAL GROUP | Age: 69
End: 2019-11-12
Payer: MEDICARE

## 2019-11-12 VITALS
DIASTOLIC BLOOD PRESSURE: 58 MMHG | TEMPERATURE: 97.6 F | SYSTOLIC BLOOD PRESSURE: 100 MMHG | HEIGHT: 66 IN | WEIGHT: 184 LBS | HEART RATE: 66 BPM | OXYGEN SATURATION: 95 % | BODY MASS INDEX: 29.57 KG/M2

## 2019-11-12 DIAGNOSIS — Z23 NEED FOR VACCINATION: ICD-10-CM

## 2019-11-12 DIAGNOSIS — R33.8 BENIGN PROSTATIC HYPERPLASIA WITH URINARY RETENTION: ICD-10-CM

## 2019-11-12 DIAGNOSIS — N40.1 BENIGN PROSTATIC HYPERPLASIA WITH URINARY RETENTION: ICD-10-CM

## 2019-11-12 DIAGNOSIS — E78.2 MIXED HYPERLIPIDEMIA: ICD-10-CM

## 2019-11-12 DIAGNOSIS — R73.03 PREDIABETES: ICD-10-CM

## 2019-11-12 PROBLEM — H61.22 IMPACTED CERUMEN OF LEFT EAR: Status: RESOLVED | Noted: 2019-04-10 | Resolved: 2019-11-12

## 2019-11-12 PROCEDURE — 90746 HEPB VACCINE 3 DOSE ADULT IM: CPT | Performed by: INTERNAL MEDICINE

## 2019-11-12 PROCEDURE — 99214 OFFICE O/P EST MOD 30 MIN: CPT | Mod: 25 | Performed by: INTERNAL MEDICINE

## 2019-11-12 PROCEDURE — G0010 ADMIN HEPATITIS B VACCINE: HCPCS | Performed by: INTERNAL MEDICINE

## 2019-11-12 SDOH — HEALTH STABILITY: MENTAL HEALTH: HOW OFTEN DO YOU HAVE 6 OR MORE DRINKS ON ONE OCCASION?: NEVER

## 2019-11-12 SDOH — HEALTH STABILITY: MENTAL HEALTH: HOW MANY STANDARD DRINKS CONTAINING ALCOHOL DO YOU HAVE ON A TYPICAL DAY?: 1 OR 2

## 2019-11-12 SDOH — HEALTH STABILITY: MENTAL HEALTH: HOW OFTEN DO YOU HAVE A DRINK CONTAINING ALCOHOL?: 2-3 TIMES A WEEK

## 2019-11-12 ASSESSMENT — PAIN SCALES - GENERAL: PAINLEVEL: NO PAIN

## 2019-11-12 NOTE — PROGRESS NOTES
Subjective:   Jamal Dee is a 69 y.o. male here today for evaluation and management of:    Mixed hyperlipidemia  Chronic. Patient reports compliancy with atorvastatin 40 mg QN and denies any associated side effects. Today we reviewed blood work from 11/07/19 which indicated overall stable lipid panel with triglycerides mildly elevated from previous; tot 153; tri 157; HDL 45; LDL 77. The 10-year ASCVD risk score (Best ELVIS Jr., et al., 2013) is: 11.1%. Patient denies any chest pain or claudication.    I reviewed recent blood work with the patient in clinic today.   Ref. Range 3/19/2019 08:52 11/7/2019 08:04   Cholesterol,Tot Latest Ref Range: 100 - 199 mg/dL 113 153   Triglycerides Latest Ref Range: 0 - 149 mg/dL 122 157 (H)   HDL Latest Ref Range: >=40 mg/dL 44 45   LDL Latest Ref Range: <100 mg/dL 45 77     BPH (benign prostatic hypertrophy) with urinary retention  Chronic. Patient reports compliancy with tamsulosin 0.4 mg QD and dutasteride 0.5 mg QN and denies any associated side effects. He reports symptoms are well managed with current regimen. PSA was last checked 6/01/17 and was normal at 0.52.    I reviewed recent blood work with the patient in clinic today.   Ref. Range 5/10/2016 15:27 6/1/2017 08:26   Prostatic Specific Antigen Tot Latest Ref Range: 0.00 - 4.00 ng/mL 0.62 0.52     Prediabetes  Chronic. Patient is currently attempting to control with diet and exercise alone, no current pharmacological treatment. Today we reviewed blood work from 11/07/19 which was improved from prior blood work with glucose 89 and A1c 6.0. Patient denies any peripheral neuropathy or fatigue.    I reviewed recent blood work with the patient in clinic today.   Ref. Range 6/1/2017 08:26 3/13/2018 10:39 3/19/2019 08:52 11/7/2019 08:04   Glucose Latest Ref Range: 65 - 99 mg/dL 112 (H) 87 100 (H) 89   Glycohemoglobin Latest Ref Range: 0.0 - 5.6 % 5.6 5.7 (H) 6.2 (H) 6.0 (H)   Estim. Avg Glu Latest Units: mg/dL 114 117 131  "126     Current medicines (including changes today)  Current Outpatient Medications   Medication Sig Dispense Refill   • atorvastatin (LIPITOR) 40 MG Tab TAKE 1 TABLET BY MOUTH EVERY EVENING 100 Tab 3   • dutasteride (AVODART) 0.5 MG capsule TAKE ONE CAPSULE BY MOUTH ONE TIME DAILY  100 Cap 3   • tamsulosin (FLOMAX) 0.4 MG capsule TAKE ONE CAPSULE BY MOUTH ONE TIME DAILY  1/2 HOUR AFTER BREAKFAST 100 Cap 3   • famciclovir (FAMVIR) 125 MG Tab Take 1 Tab by mouth 2 Times a Day. Take for 5 days if symptoms flare up. 60 Tab 3   • timolol (TIMOPTIC) 0.5 % Solution INSTILL 1 DROP INTO RIGHT EYE EVERY MORNING  6   • bimatoprost (LUMIGAN) 0.01 % Solution Place 1 Drop in both eyes every bedtime. 1 DROP IN EACH EYE AT BED TIME.       No current facility-administered medications for this visit.      He  has a past medical history of Glaucoma and Hyperlipidemia.    ROS   No chest pain, no shortness of breath, no abdominal pain       Objective:     /58 (BP Location: Left arm, Patient Position: Sitting, BP Cuff Size: Adult)   Pulse 66   Temp 36.4 °C (97.6 °F) (Temporal)   Ht 1.685 m (5' 6.34\")   Wt 83.5 kg (184 lb)   SpO2 95%  Body mass index is 29.4 kg/m².   Physical Exam:  General: Alert, oriented and no acute distress.  Eye contact is good, speech goal directed, affect calm  HEENT: conjunctiva non-injected, sclera non-icteric.  Oral mucous membranes pink and moist with no lesions.  Pinna normal. TM pearly gray.   Neck No supraclavicular, submandibular, submental lymphadenopathy or masses in the neck or supraclavicular regions.  Lungs: Normal respiratory effort, clear to auscultation bilaterally with good excursion.  CV: regular rate and rhythm. No murmurs.  Abdomen: soft, non distended, nontender, Bowel sound normal.  Ext: no edema, color normal, vascularity normal, temperature normal  Musculoskeletal exam: moving all extremities freely      Assessment and Plan:   The following treatment plan was discussed:    1. " Mixed hyperlipidemia  - Well-controlled. Continue current regimen, atorvastatin 40 mg QN. Reviewed the risks and benefits of treatment and potential side effects of medication.  - Advised to eat low fat, low carbohydrate and high fiber diet as well as do cardio physical exercise regularly.   - Plan to continue to monitor with blood work, which will be ordered and reviewed by their new PCP.    2. BPH (benign prostatic hypertrophy) with urinary retention  - Well-controlled. Continue current regimen, tamsulosin 0.4 mg QD and dutasteride 0.5 mg QN. I reviewed potential risks, benefits, and side effects of this medication with the patient in clinic today.  - Plan to continue to monitor with blood work, which will be ordered and reviewed by their new PCP.    3. Prediabetes  - He is not taking medications at this time, plan to continue to control with diet and exercise.  - I advised to make diet changes to improve the glucose levels. Advised to avoid sweets, decrease the carbs, decrease alcohol and coffee consumption, exercise regularly and keep a healthy weight.  - Plan to continue to monitor with blood work, which will be ordered and reviewed by their new PCP.    4. Need for vaccination  - Patient was agreeable to receiving the 3/3 Hep B vaccine in clinic today after discussion of potential risks, benefits, and side effects. Vaccine was administered without adverse effects.  - Hepatitis B Vaccine Adult IM    5. Health Maintenance   - Patient is due for annual wellness visit which he will complete with his new PCP.  - Patient was advised to inquire about the shingles vaccine at their local pharmacy due to clinic shortage. I reviewed the potential risks, benefits, and side effects with the patient.    Follow up: Return in about 3 months (around 2/12/2020), or if symptoms worsen or fail to improve, for Hyperlipidemia, BPH, Prediabetes.    I, Mari Baer (Scribe), am scribing for, and in the presence of, Maya Gage  M.D.. Electronically signed by: Mari Baer (Scribe), 11/12/2019.  ?  I, Maya Gage M.D., personally performed the services described in this documentation, as scribed by Mari Baer in my presence, and it is both accurate and complete.    Please note that this dictation was created using voice recognition software. I have made every reasonable attempt to correct obvious errors, but I expect that there may have unintended errors in text, spelling, punctuation, or grammar that I did not discover.

## 2020-02-05 ENCOUNTER — TELEPHONE (OUTPATIENT)
Dept: MEDICAL GROUP | Age: 70
End: 2020-02-05

## 2020-02-18 ENCOUNTER — OFFICE VISIT (OUTPATIENT)
Dept: MEDICAL GROUP | Age: 70
End: 2020-02-18
Payer: MEDICARE

## 2020-02-18 VITALS
SYSTOLIC BLOOD PRESSURE: 100 MMHG | DIASTOLIC BLOOD PRESSURE: 60 MMHG | HEIGHT: 66 IN | BODY MASS INDEX: 29.73 KG/M2 | TEMPERATURE: 97.5 F | OXYGEN SATURATION: 94 % | WEIGHT: 185 LBS | HEART RATE: 58 BPM

## 2020-02-18 DIAGNOSIS — R73.03 PREDIABETES: ICD-10-CM

## 2020-02-18 DIAGNOSIS — R33.8 BENIGN PROSTATIC HYPERPLASIA WITH URINARY RETENTION: ICD-10-CM

## 2020-02-18 DIAGNOSIS — R06.02 SOB (SHORTNESS OF BREATH): ICD-10-CM

## 2020-02-18 DIAGNOSIS — G47.33 OSA ON CPAP: ICD-10-CM

## 2020-02-18 DIAGNOSIS — Z80.0 FHX: COLON CANCER: ICD-10-CM

## 2020-02-18 DIAGNOSIS — A60.00 RECURRENT GENITAL HERPES: ICD-10-CM

## 2020-02-18 DIAGNOSIS — N40.1 BENIGN PROSTATIC HYPERPLASIA WITH URINARY RETENTION: ICD-10-CM

## 2020-02-18 DIAGNOSIS — Z76.89 ENCOUNTER TO ESTABLISH CARE WITH NEW DOCTOR: Primary | ICD-10-CM

## 2020-02-18 DIAGNOSIS — J30.89 CHRONIC NONSEASONAL ALLERGIC RHINITIS DUE TO POLLEN: ICD-10-CM

## 2020-02-18 DIAGNOSIS — E78.2 MIXED HYPERLIPIDEMIA: ICD-10-CM

## 2020-02-18 PROBLEM — J45.909 EXTRINSIC ASTHMA: Status: ACTIVE | Noted: 2020-02-18

## 2020-02-18 PROCEDURE — 99204 OFFICE O/P NEW MOD 45 MIN: CPT | Performed by: FAMILY MEDICINE

## 2020-02-18 RX ORDER — ATORVASTATIN CALCIUM 40 MG/1
40 TABLET, FILM COATED ORAL
Qty: 100 TAB | Refills: 3 | Status: SHIPPED | OUTPATIENT
Start: 2020-02-18 | End: 2021-04-19

## 2020-02-18 RX ORDER — DUTASTERIDE 0.5 MG/1
0.5 CAPSULE, LIQUID FILLED ORAL DAILY
Qty: 100 CAP | Refills: 3 | Status: SHIPPED | OUTPATIENT
Start: 2020-02-18 | End: 2021-04-19

## 2020-02-18 RX ORDER — TAMSULOSIN HYDROCHLORIDE 0.4 MG/1
0.4 CAPSULE ORAL DAILY
Qty: 100 CAP | Refills: 3 | Status: SHIPPED
Start: 2020-02-18 | End: 2020-07-16

## 2020-02-18 RX ORDER — MONTELUKAST SODIUM 10 MG/1
10 TABLET ORAL DAILY
Qty: 90 TAB | Refills: 1 | Status: SHIPPED
Start: 2020-02-18 | End: 2020-02-28

## 2020-02-18 SDOH — SOCIAL STABILITY: SOCIAL INSECURITY: WITHIN THE LAST YEAR, HAVE YOU BEEN AFRAID OF YOUR PARTNER OR EX-PARTNER?: PATIENT DECLINED

## 2020-02-18 SDOH — SOCIAL STABILITY: SOCIAL INSECURITY
WITHIN THE LAST YEAR, HAVE YOU BEEN KICKED, HIT, SLAPPED, OR OTHERWISE PHYSICALLY HURT BY YOUR PARTNER OR EX-PARTNER?: PATIENT DECLINED

## 2020-02-18 SDOH — SOCIAL STABILITY: SOCIAL INSECURITY
WITHIN THE LAST YEAR, HAVE YOU BEEN HUMILIATED OR EMOTIONALLY ABUSED IN OTHER WAYS BY YOUR PARTNER OR EX-PARTNER?: PATIENT DECLINED

## 2020-02-18 SDOH — SOCIAL STABILITY: SOCIAL INSECURITY
WITHIN THE LAST YEAR, HAVE TO BEEN RAPED OR FORCED TO HAVE ANY KIND OF SEXUAL ACTIVITY BY YOUR PARTNER OR EX-PARTNER?: PATIENT DECLINED

## 2020-02-18 ASSESSMENT — PATIENT HEALTH QUESTIONNAIRE - PHQ9: CLINICAL INTERPRETATION OF PHQ2 SCORE: 0

## 2020-02-18 NOTE — PROGRESS NOTES
CC: establish care     HPI:     Jamal Dee is a 69 y.o. male, new patient to the clinic and would like to establish care. He was previously followed by Dr. Maya Gage.     Patient has 2 children in their 30's and is retired from the [employer] business. He reports smoking history, quit date . Reports glass of wine nightly. He is  and is not sexually active. He has a familial history of sister diagnosed with colon cancer ~age 56,  age 58. Patient reports he has a colonoscopy every 5 years, most 2018, 5 year recall. He is currently asymptomatic and without abdominal pain, hematochezia, or melena.     Chronic history of BPH. Patient reports compliancy with tamsulosin 0.4 mg QD and dutasteride 0.5 mg QD and denies any associated side effects. He reports symptoms are well managed on current regimen and he denies any other urinary symptoms.    Chronic history of recurrent genital herpes. He has been taking Famciclovir 125 mg as needed for outbreak since  and has been tolerating it without side effects. He has a breakout only once a year and takes 2 tablets a day for 4-5 days with successful treatment of symptoms. He is currently asymptomatic.    Chronic history of mixed hyperlipidemia. Patient reports compliancy with atorvastatin 40 mg QN and denies any associated side effects. Today we reviewed blood work from 19 which indicated elevated lipid panel with tot 153; tri 157; HDL 45; LDL 77. The 10-year ASCVD risk score ([place] DC Jr., et al., 2013) is: 11.1%. He denies any chest pain or claudication.    Chronic history of prediabetes. Patient is currently attempting to control through diet and exercise alone, no current pharmacological treatment. He does not regularly exercise. Today we reviewed blood work from 19 which indicated stable A1c of 6.0, previously 6.2 on 3/19/19. He denies any vision changes, peripheral neuropathy, polyuria, polydipsia, fatigue, or hypoglycemic episodes.      Chronic history of DELMA. Patient reports compliancy with CPAP and denies any associated side effects. He was most recently evaluated by CHANTEL Kilpatrick (Pulmonology) 8/1/19. He denies waking up gasping for air, waking up with headaches, fatigue, or daytime symptoms.    Patient reports that he's noticed that once a week he'll have a 5 minutes episode of shortness of breath. He denies any associated chest pain, diaphoresis, or lightheadedness. He reports that episodes can occur while playing golf or while at rest watching TV. However, most of his symptoms happen outdoor. He denies known history of asthma, however does have history of severe nonseasonal allergic rhinits due to pollen. He uses an over the counter medication from [employer] for his allergies with good symptom relief. He denies prior use of singulair.     Current medicines (including changes today)  Current Outpatient Medications   Medication Sig Dispense Refill   • montelukast (SINGULAIR) 10 MG Tab Take 1 Tab by mouth every day. 90 Tab 1   • atorvastatin (LIPITOR) 40 MG Tab Take 1 Tab by mouth every bedtime. 100 Tab 3   • dutasteride (AVODART) 0.5 MG capsule Take 1 Cap by mouth every day. 100 Cap 3   • tamsulosin (FLOMAX) 0.4 MG capsule Take 1 Cap by mouth every day. 100 Cap 3   • famciclovir (FAMVIR) 125 MG Tab Take 1 Tab by mouth 2 Times a Day. Take for 5 days if symptoms flare up. 60 Tab 3   • timolol (TIMOPTIC) 0.5 % Solution INSTILL 1 DROP INTO RIGHT EYE EVERY MORNING  6   • bimatoprost (LUMIGAN) 0.01 % Solution Place 1 Drop in both eyes every bedtime. 1 DROP IN EACH EYE AT BED TIME.       No current facility-administered medications for this visit.      He  has a past medical history of Glaucoma and Hyperlipidemia.  He  has a past surgical history that includes cataract phaco with iol (5/2/2013) and cataract phaco with iol (5/16/2013).  Social History     Tobacco Use   • Smoking status: Former Smoker     Packs/day: 1.50     Years: 15.00      "Pack years: 22.50     Types: Cigarettes     Last attempt to quit: 1984     Years since quittin.0   • Smokeless tobacco: Never Used   • Tobacco comment: quit    Substance Use Topics   • Alcohol use: Yes     Alcohol/week: 4.2 oz     Types: 7 Glasses of wine per week     Frequency: 2-3 times a week     Drinks per session: 1 or 2     Binge frequency: Never     Comment: 1 glass a night   • Drug use: No     Social History     Social History Narrative   • Not on file     Family History   Problem Relation Age of Onset   • Heart Attack Father    • Cancer Sister 62        colon cancer   • Sleep Apnea Neg Hx      Family Status   Relation Name Status   • Fa     • Mo   at age 95   • Sis     • MGMo     • MGFa     • PGMo     • PGFa     • Bro  Alive   • Bro  Alive   • Bro  Alive   • Bro  Alive   • Neg Hx  (Not Specified)       I personally reviewed patient's problem list, allergies, medications, family hx, social hx with patient and update EPIC.     REVIEW OF SYSTEMS:  CONSTITUTIONAL:  Denies night sweats, fatigue, malaise, lethargy, fever or chills.  RESPIRATORY:  Denies cough, wheeze, hemoptysis.  CARDIOVASCULAR:  Denies chest pains, palpitations, pedal edema  GASTROINTESTINAL:  Denies abdominal pain, nausea or vomiting, diarrhea, constipation, hematemesis, hematochezia, melena.  GENITOURINARY:  Denies urinary urgency, frequency, dysuria, or hematuria.  No obstructive symptoms.  Denies unusual discharge.    All other systems reviewed and are negative     Objective:     /60 (BP Location: Right arm, Patient Position: Sitting, BP Cuff Size: Adult)   Pulse (!) 58   Temp 36.4 °C (97.5 °F) (Temporal)   Ht 1.676 m (5' 6\")   Wt 83.9 kg (185 lb)   SpO2 94%  Body mass index is 29.86 kg/m².  Physical Exam:    Constitutional: Awake, alert, in no apparent distress.  Skin: Warm, dry, good turgor, no rashes/jaundice in visible areas.  Eye: PERRL, intact EOM, " conjunctiva clear, lids normal.  ENMT: TM and auditory canal wnl, nasal & oral mucosa wnl, lips without lesions, good dentition, oropharynx clear.  Neck: Trachea midline, no masses, no thyromegaly. No cervical or supraclavicular lymphadenopathy.  Respiratory: Unlabored respiratory effort, lungs clear to auscultation, no wheezes, no rales.  Cardiovascular: Normal S1, S2, no murmur, no rubs, no gallops, no pedal edema.  Psych: Alert and oriented x3, affect and mood wnl, intact judgement and insight.       Assessment and Plan:   The following treatment plan was discussed:    1. BPH (benign prostatic hypertrophy) with urinary retention  - dutasteride (AVODART) 0.5 MG capsule; Take 1 Cap by mouth every day.  Dispense: 100 Cap; Refill: 3  - tamsulosin (FLOMAX) 0.4 MG capsule; Take 1 Cap by mouth every day.  Dispense: 100 Cap; Refill: 3    2. Recurrent genital herpes  Chronic, takes Famvir PRN for outbreak, doing well currently, denies any genital symptoms.     3. Mixed hyperlipidemia  - atorvastatin (LIPITOR) 40 MG Tab; Take 1 Tab by mouth every bedtime.  Dispense: 100 Tab; Refill: 3  - Lipid Profile; Future  - recommended dietary modification, exercise, and weight loss.      4. Prediabetes  - REFERRAL TO [facility] IMPROVEMENT PROGRAMS (HIP) Services Requested: Diabetes Prevention Program; Reason for Visit: Medical Condition Requiring Nutrition Counseling  - Pt was counseled on dietary modification, weight loss   - Recommended moderate intensity exercise at least 30 minutes per day x 5 days per week.  - Follow up in 6 months.   - CBC WITH DIFFERENTIAL; Future  - Comp Metabolic Panel; Future  - HEMOGLOBIN A1C; Future    5. DELMA on CPAP  - Pt was counseled on risks of untreated DELMA including: increased risk of cardiovascular disease including coronary artery disease, systemic arterial hypertension, pulmonary arterial hypertension, cardiac arrythmias, stroke, increased risk of MVA due to drowsiness, type 2 diabetes,  chronic kidney disease, and non-alcoholic liver disease.   - recommended weight loss and consistent use of CPAP  - f/u with sleep medicine as directed.      6. SOB, transient, intermittent  Intermittent SOB, lasting for 5 minutes than resolve w/o intervention. Neg hx of COPD or asthma. He is former smoker but quit since 1984. Symptoms happens when he is playing golf (mostly) or when he is resting watching TV. Will start a trial of Singulair for possible extrinsic asthma. Might also help with severe allergic rhinitis.   - montelukast (SINGULAIR) 10 MG Tab; Take 1 Tab by mouth every day.  Dispense: 90 Tab; Refill: 1     7. Chronic nonseasonal allergic rhinitis due to pollen  - start Singular 10 mg qd as above  - cont OTC antihistamine     8. Encounter to establish care with new doctor  General health and wellness counseling provided. Topics might include: diet, exercise, vitamin supplement, mental health, sleep, stress, preventive cares and vaccine recommendations.      Rosina Agosto M.D.    Records requested.  Follow-up: Return in about 6 months (around 8/18/2020) for Multiple issues.    Please note that this dictation was created using voice recognition software and/or scribes. I have made every reasonable attempt to correct obvious errors, but I expect that there are errors of grammar and possibly content that I did not discover before finalizing the note.    IMari (Scribe), am scribing for, and in the presence of, Rosina Agosto M.D.    Electronically signed by: Mari Baer ([place]), 2/18/2020    IRosina M.D. personally performed the services described in this documentation, as scribed by Mari Baer in my presence, and it is both accurate and complete.

## 2020-02-26 ENCOUNTER — TELEPHONE (OUTPATIENT)
Dept: MEDICAL GROUP | Age: 70
End: 2020-02-26

## 2020-02-26 NOTE — TELEPHONE ENCOUNTER
ESTABLISHED PATIENT PRE-VISIT PLANNING     Patient was NOT contacted to complete PVP.     Note: Patient will not be contacted if there is no indication to call.     1.  Reviewed notes from the last few office visits within the medical group: Yes    2.  If any orders were placed at last visit or intended to be done for this visit (i.e. 6 mos follow-up), do we have Results/Consult Notes?        •  Labs - Labs ordered, but not to be completed until FUTURE.   Note: If patient appointment is for lab review and patient did not complete labs, check with provider if OK to reschedule patient until labs completed.       •  Imaging - Imaging was not ordered at last office visit.       •  Referrals - Referral ordered, patient has NOT been seen.    3. Is this appointment scheduled as a Hospital Follow-Up? No    4.  Immunizations were updated in Epic using WebIZ?: Epic matches WebIZ       •  Web Iz Recommendations: Patient is up to date on all vaccines    5.  Patient is due for the following Health Maintenance Topics:   Health Maintenance Due   Topic Date Due   • Annual Wellness Visit  09/19/2019       6. Orders for overdue Health Maintenance topics pended in Pre-Charting? N\A    7.  AHA (MDX) form printed for Provider? YES    8.  Patient was NOT informed to arrive 15 min prior to their scheduled appointment and bring in their medication bottles.

## 2020-02-28 ENCOUNTER — OFFICE VISIT (OUTPATIENT)
Dept: MEDICAL GROUP | Age: 70
End: 2020-02-28
Payer: MEDICARE

## 2020-02-28 VITALS
TEMPERATURE: 97.4 F | SYSTOLIC BLOOD PRESSURE: 112 MMHG | OXYGEN SATURATION: 95 % | HEIGHT: 66 IN | HEART RATE: 59 BPM | DIASTOLIC BLOOD PRESSURE: 62 MMHG | WEIGHT: 190 LBS | BODY MASS INDEX: 30.53 KG/M2

## 2020-02-28 DIAGNOSIS — J40 BRONCHITIS: Primary | ICD-10-CM

## 2020-02-28 PROCEDURE — 99214 OFFICE O/P EST MOD 30 MIN: CPT | Performed by: FAMILY MEDICINE

## 2020-02-28 RX ORDER — AZITHROMYCIN 250 MG/1
TABLET, FILM COATED ORAL
Qty: 6 TAB | Refills: 0 | Status: SHIPPED | OUTPATIENT
Start: 2020-02-28 | End: 2020-03-04

## 2020-02-28 RX ORDER — METHYLPREDNISOLONE 4 MG/1
TABLET ORAL
Qty: 21 TAB | Refills: 0 | Status: SHIPPED
Start: 2020-02-28 | End: 2020-08-18

## 2020-02-28 RX ORDER — CODEINE PHOSPHATE AND GUAIFENESIN 10; 100 MG/5ML; MG/5ML
5 SOLUTION ORAL EVERY 4 HOURS PRN
Qty: 140 ML | Refills: 0 | Status: SHIPPED | OUTPATIENT
Start: 2020-02-28 | End: 2020-03-06

## 2020-02-28 SDOH — HEALTH STABILITY: MENTAL HEALTH: HOW OFTEN DO YOU HAVE A DRINK CONTAINING ALCOHOL?: 4 OR MORE TIMES A WEEK

## 2020-02-28 ASSESSMENT — ENCOUNTER SYMPTOMS: GENERAL WELL-BEING: GOOD

## 2020-02-28 ASSESSMENT — PATIENT HEALTH QUESTIONNAIRE - PHQ9: CLINICAL INTERPRETATION OF PHQ2 SCORE: 0

## 2020-02-28 ASSESSMENT — ACTIVITIES OF DAILY LIVING (ADL): BATHING_REQUIRES_ASSISTANCE: 0

## 2020-02-28 NOTE — PROGRESS NOTES
Subjective:   CC: cough    HPI:     Jamal Dee is a 69 y.o. male who is an established patient of the clinic, presents with the following concerns:     Patient presents with acute development of cough, ear congestion and rhinorrhea onset 4 days ago. Patient states he noticed symptoms when he was driving to [place], [place] for his daughter's 30th birthday. He is allergic to goose down, and cough was aggravated by goose down in his daughter's apartment. He states he feels like something is in his chest, that he is unable to cough out. No shortness of breath. Denies any fever or chills. Denies any recent travel out of the country or contact with individuals who traveled out of the country. He has received Influenza and Pneumonia vaccine in the past. Patient was prescribed Singulair during last OV for intermittent, transient SOB. The patient states he does not respond well Singular due to side effect of cough and has already disposed of the medication. Patient states he developed similar symptoms last year when he visited his daughter, and responded well to azithromycin at the time.       Current medicines (including changes today)  Current Outpatient Medications   Medication Sig Dispense Refill   • azithromycin (ZITHROMAX) 250 MG Tab Take 2 tablets on the first day and 1 tablet daily thereafter for 4 days. 6 Tab 0   • methylPREDNISolone (MEDROL DOSEPAK) 4 MG Tablet Therapy Pack 6 tabs day 1, 5 tabs day 2, 4 tabs day 3, 3 tabs day 4, 2 tabs day 5, 1 tab day 6. Take with food. 21 Tab 0   • guaifenesin-codeine (ROBITUSSIN AC) Solution oral solution Take 5 mL by mouth every four hours as needed for up to 7 days. 140 mL 0   • atorvastatin (LIPITOR) 40 MG Tab Take 1 Tab by mouth every bedtime. 100 Tab 3   • dutasteride (AVODART) 0.5 MG capsule Take 1 Cap by mouth every day. 100 Cap 3   • famciclovir (FAMVIR) 125 MG Tab Take 1 Tab by mouth 2 Times a Day. Take for 5 days if symptoms flare up. 60 Tab 3   • timolol  "(TIMOPTIC) 0.5 % Solution INSTILL 1 DROP INTO RIGHT EYE EVERY MORNING  6   • bimatoprost (LUMIGAN) 0.01 % Solution Place 1 Drop in both eyes every bedtime. 1 DROP IN EACH EYE AT BED TIME.     • tamsulosin (FLOMAX) 0.4 MG capsule Take 1 Cap by mouth every day. (Patient not taking: Reported on 2/28/2020) 100 Cap 3     No current facility-administered medications for this visit.      He  has a past medical history of Glaucoma and Hyperlipidemia.    I personally reviewed patient's problem list, allergies, medications, family hx, social hx with patient and update EPIC.     REVIEW OF SYSTEMS:  CONSTITUTIONAL:  Denies night sweats, fatigue, malaise, lethargy, fever or chills.  RESPIRATORY:  Denies wheeze, hemoptysis, or shortness of breath.  CARDIOVASCULAR:  Denies chest pains, palpitations, pedal edema     Objective:     /62 (BP Location: Right arm, Patient Position: Sitting, BP Cuff Size: Adult)   Pulse (!) 59   Temp 36.3 °C (97.4 °F) (Temporal)   Ht 1.676 m (5' 6\")   Wt 86.2 kg (190 lb)   SpO2 95%  Body mass index is 30.67 kg/m².    Physical Exam:  Constitutional: awake, alert, in no distress.  Skin: Warm, dry, good turgor, no rashes, bruises, ulcers in visible areas.  Eye: conjunctiva clear, lids neg for edema or lesions.  ENMT:Lips without lesions, good dentition, oropharynx clear.  -Mild bilateral middle ear effusion, inferior nasal mucosa. Throat is hyperemic. Tonsils not visible.   Neck: Trachea midline, no masses, no thyromegaly. No cervical or supraclavicular lymphadenopathy  Respiratory: Unlabored respiratory effort, no rales.  -Mild bilateral expiratory wheezes  Cardiovascular: Normal S1, S2, no murmur, no pedal edema.   Psych: Oriented x3, affect and mood wnl, intact judgement and insight.       Assessment and Plan:   The following treatment plan was discussed    1. Bronchitis  - azithromycin (ZITHROMAX) 250 MG Tab; Take 2 tablets on the first day and 1 tablet daily thereafter for 4 days.  Dispense: " 6 Tab; Refill: 0  - methylPREDNISolone (MEDROL DOSEPAK) 4 MG Tablet Therapy Pack; 6 tabs day 1, 5 tabs day 2, 4 tabs day 3, 3 tabs day 4, 2 tabs day 5, 1 tab day 6. Take with food.  Dispense: 21 Tab; Refill: 0  - guaifenesin-codeine (ROBITUSSIN AC) Solution oral solution; Take 5 mL by mouth every four hours as needed for up to 7 days.  Dispense: 140 mL; Refill: 0     Rosina Agosto M.D.    Followup: Return for As needed.    Please note that this dictation was created using voice recognition software and/or scribes. I have made every reasonable attempt to correct obvious errors, but I expect that there are errors of grammar and possibly content that I did not discover before finalizing the note.     Tamika BAIN (Scribe), am scribing for, and in the presence of, Rosina Agosto M.D.    Electronically signed by: Tamika Villalba (Scribe), 2/28/2020    Rosina BAIN M.D. personally performed the services described in this documentation, as scribed by Tamika Villalba in my presence, and it is both accurate and complete.

## 2020-08-10 ENCOUNTER — APPOINTMENT (OUTPATIENT)
Dept: SLEEP MEDICINE | Facility: MEDICAL CENTER | Age: 70
End: 2020-08-10
Payer: MEDICARE

## 2020-08-11 ENCOUNTER — HOSPITAL ENCOUNTER (OUTPATIENT)
Dept: LAB | Facility: MEDICAL CENTER | Age: 70
End: 2020-08-11
Attending: FAMILY MEDICINE
Payer: MEDICARE

## 2020-08-11 DIAGNOSIS — R73.03 PREDIABETES: ICD-10-CM

## 2020-08-11 DIAGNOSIS — E78.2 MIXED HYPERLIPIDEMIA: ICD-10-CM

## 2020-08-11 LAB
ALBUMIN SERPL BCP-MCNC: 4 G/DL (ref 3.2–4.9)
ALBUMIN/GLOB SERPL: 1.5 G/DL
ALP SERPL-CCNC: 70 U/L (ref 30–99)
ALT SERPL-CCNC: 23 U/L (ref 2–50)
ANION GAP SERPL CALC-SCNC: 12 MMOL/L (ref 7–16)
AST SERPL-CCNC: 14 U/L (ref 12–45)
BASOPHILS # BLD AUTO: 0.6 % (ref 0–1.8)
BASOPHILS # BLD: 0.03 K/UL (ref 0–0.12)
BILIRUB SERPL-MCNC: 0.6 MG/DL (ref 0.1–1.5)
BUN SERPL-MCNC: 15 MG/DL (ref 8–22)
CALCIUM SERPL-MCNC: 8.7 MG/DL (ref 8.5–10.5)
CHLORIDE SERPL-SCNC: 111 MMOL/L (ref 96–112)
CHOLEST SERPL-MCNC: 116 MG/DL (ref 100–199)
CO2 SERPL-SCNC: 22 MMOL/L (ref 20–33)
CREAT SERPL-MCNC: 0.75 MG/DL (ref 0.5–1.4)
EOSINOPHIL # BLD AUTO: 0.19 K/UL (ref 0–0.51)
EOSINOPHIL NFR BLD: 4.1 % (ref 0–6.9)
ERYTHROCYTE [DISTWIDTH] IN BLOOD BY AUTOMATED COUNT: 48.7 FL (ref 35.9–50)
EST. AVERAGE GLUCOSE BLD GHB EST-MCNC: 117 MG/DL
FASTING STATUS PATIENT QL REPORTED: NORMAL
GLOBULIN SER CALC-MCNC: 2.6 G/DL (ref 1.9–3.5)
GLUCOSE SERPL-MCNC: 104 MG/DL (ref 65–99)
HBA1C MFR BLD: 5.7 % (ref 0–5.6)
HCT VFR BLD AUTO: 48.1 % (ref 42–52)
HDLC SERPL-MCNC: 49 MG/DL
HGB BLD-MCNC: 15.2 G/DL (ref 14–18)
IMM GRANULOCYTES # BLD AUTO: 0.01 K/UL (ref 0–0.11)
IMM GRANULOCYTES NFR BLD AUTO: 0.2 % (ref 0–0.9)
LDLC SERPL CALC-MCNC: 52 MG/DL
LYMPHOCYTES # BLD AUTO: 1.35 K/UL (ref 1–4.8)
LYMPHOCYTES NFR BLD: 29.2 % (ref 22–41)
MCH RBC QN AUTO: 30.8 PG (ref 27–33)
MCHC RBC AUTO-ENTMCNC: 31.6 G/DL (ref 33.7–35.3)
MCV RBC AUTO: 97.6 FL (ref 81.4–97.8)
MONOCYTES # BLD AUTO: 0.36 K/UL (ref 0–0.85)
MONOCYTES NFR BLD AUTO: 7.8 % (ref 0–13.4)
NEUTROPHILS # BLD AUTO: 2.68 K/UL (ref 1.82–7.42)
NEUTROPHILS NFR BLD: 58.1 % (ref 44–72)
NRBC # BLD AUTO: 0 K/UL
NRBC BLD-RTO: 0 /100 WBC
PLATELET # BLD AUTO: 161 K/UL (ref 164–446)
PMV BLD AUTO: 10.1 FL (ref 9–12.9)
POTASSIUM SERPL-SCNC: 4.1 MMOL/L (ref 3.6–5.5)
PROT SERPL-MCNC: 6.6 G/DL (ref 6–8.2)
RBC # BLD AUTO: 4.93 M/UL (ref 4.7–6.1)
SODIUM SERPL-SCNC: 145 MMOL/L (ref 135–145)
TRIGL SERPL-MCNC: 74 MG/DL (ref 0–149)
WBC # BLD AUTO: 4.6 K/UL (ref 4.8–10.8)

## 2020-08-11 PROCEDURE — 36415 COLL VENOUS BLD VENIPUNCTURE: CPT

## 2020-08-11 PROCEDURE — 80061 LIPID PANEL: CPT

## 2020-08-11 PROCEDURE — 85025 COMPLETE CBC W/AUTO DIFF WBC: CPT

## 2020-08-11 PROCEDURE — 83036 HEMOGLOBIN GLYCOSYLATED A1C: CPT

## 2020-08-11 PROCEDURE — 80053 COMPREHEN METABOLIC PANEL: CPT

## 2020-08-17 ENCOUNTER — TELEPHONE (OUTPATIENT)
Dept: MEDICAL GROUP | Age: 70
End: 2020-08-17

## 2020-08-17 ENCOUNTER — OFFICE VISIT (OUTPATIENT)
Dept: SLEEP MEDICINE | Facility: MEDICAL CENTER | Age: 70
End: 2020-08-17
Payer: MEDICARE

## 2020-08-17 VITALS
RESPIRATION RATE: 16 BRPM | HEIGHT: 66 IN | OXYGEN SATURATION: 93 % | BODY MASS INDEX: 26.36 KG/M2 | DIASTOLIC BLOOD PRESSURE: 60 MMHG | HEART RATE: 72 BPM | SYSTOLIC BLOOD PRESSURE: 126 MMHG | WEIGHT: 164 LBS

## 2020-08-17 DIAGNOSIS — G47.33 OSA ON CPAP: ICD-10-CM

## 2020-08-17 DIAGNOSIS — Z87.891 FORMER SMOKER: ICD-10-CM

## 2020-08-17 PROCEDURE — 99214 OFFICE O/P EST MOD 30 MIN: CPT | Performed by: NURSE PRACTITIONER

## 2020-08-17 ASSESSMENT — FIBROSIS 4 INDEX: FIB4 SCORE: 1.27

## 2020-08-17 NOTE — PROGRESS NOTES
Chief Complaint   Patient presents with   • Apnea     Last Seen 8/1/19       HPI:  Jamal Dee is a 69 y.o. year old male here today for follow-up on DELMA.  Last OV 8/1/19.     PSG 5-16 indicates severe DELMA with an AHI 55.6/h O2 jake 60%.  He was successfully titrated to CPAP 10 cm but had intolerance issues and pressures dropped to 6 cm with elevated AHI currently using CPAP 8 cm H2O nightly. Device obtained in 2016.   Prior compliance download noted a reduced AHI of 7.3/h.  Compliance report today notes consistent nightly use 100%, average nightly use 7 hours 19 minutes, minimal mask leak with reduced AHI 5.5/h.  I reviewed finds with patient.  His mask and pressure well.  He denies morning headaches.  He notes seasonal allergies controlled with daily antihistamine. He denies cardiac or respiratory symptoms. No GERD or pedal edema.  His wife notes some possible pauses of breathing at night and would like to increase his pressure. He feels he overall sleeps well. He may nap for 20min if he does 2 rounds of golf. Reviewed sleep hygiene.    ROS: As per HPI and otherwise negative if not stated.    Past Medical History:   Diagnosis Date   • Glaucoma    • Hyperlipidemia        Past Surgical History:   Procedure Laterality Date   • CATARACT PHACO WITH IOL  5/16/2013    Performed by Theodore Guzman M.D. at [facility]   • CATARACT PHACO WITH IOL  5/2/2013    Performed by Theodore Guzman M.D. at [facility]       Family History   Problem Relation Age of Onset   • Heart Attack Father    • Cancer Sister 62        colon cancer   • Sleep Apnea Neg Hx        Social History     Socioeconomic History   • Marital status:      Spouse name: Not on file   • Number of children: 2   • Years of education: Not on file   • Highest education level: Not on file   Occupational History   • Not on file   Social Needs   • Financial resource strain: Not on file   • Food insecurity     Worry: Not on file  "    Inability: Not on file   • Transportation needs     Medical: Not on file     Non-medical: Not on file   Tobacco Use   • Smoking status: Former Smoker     Packs/day: 1.50     Years: 15.00     Pack years: 22.50     Types: Cigarettes     Quit date: 1984     Years since quittin.5   • Smokeless tobacco: Never Used   • Tobacco comment: quit    Substance and Sexual Activity   • Alcohol use: Yes     Alcohol/week: 4.2 oz     Types: 7 Glasses of wine per week     Frequency: 4 or more times a week     Drinks per session: 1 or 2     Binge frequency: Never     Comment: 1 glass a night   • Drug use: No   • Sexual activity: Not Currently     Partners: Female   Lifestyle   • Physical activity     Days per week: Not on file     Minutes per session: Not on file   • Stress: Not on file   Relationships   • Social connections     Talks on phone: Not on file     Gets together: Not on file     Attends Restoration service: Not on file     Active member of club or organization: Not on file     Attends meetings of clubs or organizations: Not on file     Relationship status: Not on file   • Intimate partner violence     Fear of current or ex partner: Patient refused     Emotionally abused: Patient refused     Physically abused: Patient refused     Forced sexual activity: Patient refused   Other Topics Concern   • Not on file   Social History Narrative   • Not on file       Allergies as of 2020 - Reviewed 2020   Allergen Reaction Noted   • Other environmental Unspecified 2017        Vitals:  /60 (BP Location: Left arm, Patient Position: Sitting, BP Cuff Size: Adult)   Pulse 72   Resp 16   Ht 1.676 m (5' 6\")   Wt 74.4 kg (164 lb)   SpO2 93%     Current medications as of today   Current Outpatient Medications   Medication Sig Dispense Refill   • methylPREDNISolone (MEDROL DOSEPAK) 4 MG Tablet Therapy Pack 6 tabs day 1, 5 tabs day 2, 4 tabs day 3, 3 tabs day 4, 2 tabs day 5, 1 tab day 6. Take with " food. 21 Tab 0   • atorvastatin (LIPITOR) 40 MG Tab Take 1 Tab by mouth every bedtime. 100 Tab 3   • dutasteride (AVODART) 0.5 MG capsule Take 1 Cap by mouth every day. 100 Cap 3   • famciclovir (FAMVIR) 125 MG Tab Take 1 Tab by mouth 2 Times a Day. Take for 5 days if symptoms flare up. 60 Tab 3   • timolol (TIMOPTIC) 0.5 % Solution INSTILL 1 DROP INTO RIGHT EYE EVERY MORNING  6   • bimatoprost (LUMIGAN) 0.01 % Solution Place 1 Drop in both eyes every bedtime. 1 DROP IN EACH EYE AT BED TIME.       No current facility-administered medications for this visit.          Physical Exam:   Gen:           Alert and oriented, No apparent distress. Mood and affect appropriate, normal interaction with examiner.  Eyes:          PERRL, EOM intact, sclere white, conjunctive moist.  Ears:          Not examined.   Hearing:     Grossly intact.  Nose:          Normal, no lesions or deformities.  Dentition:    Good dentition.  Oropharynx:   mask  Mallampati Classification: mask  Neck:        Supple, trachea midline, no masses.  Respiratory Effort: No intercostal retractions or use of accessory muscles.   Lung Auscultation:      Clear to auscultation bilaterally; no rales, rhonchi or wheezing.  CV:            Regular rate and rhythm. No murmurs, rubs or gallops.  Abd:           Not examined.   Lymphadenopathy: Not examined.  Gait and Station: Normal.  Digits and Nails: No clubbing, cyanosis, petechiae, or nodes.   Cranial Nerves: II-XII grossly intact.  Skin:        No rashes, lesions or ulcers noted.               Ext:           No cyanosis or edema.      Assessment:  1. DELMA on CPAP  DME Mask and Supplies    DME Other   2. BMI 26.0-26.9,adult     3. Former smoker         Immunizations:    Flu:9/2019  Pneumovax 23:9/2018  Prevnar 13:6/2017    Plan:  1.  States clinically stable but patient would like to attempt increasing his pressures. He has lost 17lbs since last OV.  DME other; pressure adjusted to 9 cm in office.  DME  mask/supplies.  2.  Discussed sleep hygiene and avoidance of naps.  3.  Follow-up with primary care for the health concerns.  4.  Follow-up in 1 year compliance report, sooner if needed.    Please note that this dictation was created using voice recognition software. I have made every reasonable attempt to correct obvious errors, but it is possible there are errors of grammar and possibly content that I did not discover before finalizing the note.

## 2020-08-17 NOTE — TELEPHONE ENCOUNTER
Phone Number Called: [phone] (home)       Call outcome: Called pt. LVM. Request call back to complete AWV Pre-Visit Plan    Message: Called pt. No answer. LVM.

## 2020-08-18 ENCOUNTER — TELEPHONE (OUTPATIENT)
Dept: MEDICAL GROUP | Age: 70
End: 2020-08-18

## 2020-08-18 ENCOUNTER — TELEMEDICINE (OUTPATIENT)
Dept: MEDICAL GROUP | Age: 70
End: 2020-08-18
Payer: MEDICARE

## 2020-08-18 VITALS — HEIGHT: 67 IN | BODY MASS INDEX: 25.9 KG/M2 | WEIGHT: 165 LBS

## 2020-08-18 DIAGNOSIS — G47.33 OSA ON CPAP: ICD-10-CM

## 2020-08-18 DIAGNOSIS — N40.1 BENIGN PROSTATIC HYPERPLASIA WITH URINARY RETENTION: ICD-10-CM

## 2020-08-18 DIAGNOSIS — A60.00 RECURRENT GENITAL HERPES: ICD-10-CM

## 2020-08-18 DIAGNOSIS — Z00.00 MEDICARE ANNUAL WELLNESS VISIT, SUBSEQUENT: Primary | ICD-10-CM

## 2020-08-18 DIAGNOSIS — R73.03 PREDIABETES: ICD-10-CM

## 2020-08-18 DIAGNOSIS — J30.89 CHRONIC NONSEASONAL ALLERGIC RHINITIS DUE TO POLLEN: ICD-10-CM

## 2020-08-18 DIAGNOSIS — R33.8 BENIGN PROSTATIC HYPERPLASIA WITH URINARY RETENTION: ICD-10-CM

## 2020-08-18 DIAGNOSIS — E78.2 MIXED HYPERLIPIDEMIA: ICD-10-CM

## 2020-08-18 PROBLEM — J45.909 EXTRINSIC ASTHMA: Status: RESOLVED | Noted: 2020-02-18 | Resolved: 2020-08-18

## 2020-08-18 PROCEDURE — G0439 PPPS, SUBSEQ VISIT: HCPCS | Mod: 95,CR | Performed by: FAMILY MEDICINE

## 2020-08-18 RX ORDER — TAMSULOSIN HYDROCHLORIDE 0.4 MG/1
0.4 CAPSULE ORAL DAILY
Qty: 100 CAP | Refills: 3 | Status: SHIPPED | OUTPATIENT
Start: 2020-09-15 | End: 2021-06-17 | Stop reason: SDUPTHER

## 2020-08-18 RX ORDER — TAMSULOSIN HYDROCHLORIDE 0.4 MG/1
CAPSULE ORAL
COMMUNITY
Start: 2020-07-17 | End: 2020-08-18 | Stop reason: SDUPTHER

## 2020-08-18 ASSESSMENT — ACTIVITIES OF DAILY LIVING (ADL): BATHING_REQUIRES_ASSISTANCE: 0

## 2020-08-18 ASSESSMENT — PATIENT HEALTH QUESTIONNAIRE - PHQ9: CLINICAL INTERPRETATION OF PHQ2 SCORE: 0

## 2020-08-18 ASSESSMENT — ENCOUNTER SYMPTOMS: GENERAL WELL-BEING: GOOD

## 2020-08-18 ASSESSMENT — FIBROSIS 4 INDEX: FIB4 SCORE: 1.27

## 2020-08-18 ASSESSMENT — PAIN SCALES - GENERAL: PAINLEVEL: NO PAIN

## 2020-08-18 NOTE — PROGRESS NOTES
Chief Complaint   Patient presents with   • Annual Wellness Visit         HPI:  Jamal is a 69 y.o. here for Medicare Annual Wellness Visit    Patient is doing well.  He has been exercising every day.  He states that he lost approximately 20 pounds intentionally.  Patient feels well.  He plays golf 1 time per week.  He is compliant with medication.  He denies any side effect from medications.      Patient Active Problem List    Diagnosis Date Noted   • FHx: colon cancer 02/18/2020   • Recurrent genital herpes 03/26/2019   • Chronic nonseasonal allergic rhinitis due to pollen 03/27/2018   • Mixed hyperlipidemia 04/04/2017   • BPH (benign prostatic hypertrophy) with urinary retention 04/04/2017   • Prediabetes 04/04/2017   • DELMA on CPAP 05/06/2016       Current Outpatient Medications   Medication Sig Dispense Refill   • [START ON 9/15/2020] tamsulosin (FLOMAX) 0.4 MG capsule Take 1 Cap by mouth every day. 100 Cap 3   • atorvastatin (LIPITOR) 40 MG Tab Take 1 Tab by mouth every bedtime. 100 Tab 3   • dutasteride (AVODART) 0.5 MG capsule Take 1 Cap by mouth every day. 100 Cap 3   • famciclovir (FAMVIR) 125 MG Tab Take 1 Tab by mouth 2 Times a Day. Take for 5 days if symptoms flare up. 60 Tab 3   • timolol (TIMOPTIC) 0.5 % Solution INSTILL 1 DROP INTO RIGHT EYE EVERY MORNING  6   • bimatoprost (LUMIGAN) 0.01 % Solution Place 1 Drop in both eyes every bedtime. 1 DROP IN EACH EYE AT BED TIME.       No current facility-administered medications for this visit.         Patient is taking medications as noted in medication list.  Current supplements as per medication list.     Allergies: Other environmental    Current social contact/activities: Treadmill 3 miles daily, golfing 1-2 days per week, house hold chores, yard work, pt resides with spouse, watch videos/news    Is patient current with immunizations? No, due for SHINGRIX (Shingles). Patient is interested in receiving NONE today.    He  reports that he quit smoking  about 36 years ago. His smoking use included cigarettes. He has a 22.50 pack-year smoking history. He has never used smokeless tobacco. He reports current alcohol use of about 4.2 oz of alcohol per week. He reports that he does not use drugs.  Counseling given: Not Answered  Comment: quit 1985        DPA/Advanced directive: Patient has Living Will, but it is not on file. Instructed to bring in a copy to scan into their chart.    ROS:    Gait: Uses no assistive device   Ostomy: No   Other tubes: No   Amputations: No   Chronic oxygen use No, However pt states uses CPAP  Last eye exam: Oct.-September 2019  Wears hearing aids: No   : Denies any urinary leakage during the last 6 months      Screening:    Depression Screening    Little interest or pleasure in doing things?  0 - not at all  Feeling down, depressed, or hopeless? 0 - not at all  Patient Health Questionnaire Score: 0    Screening for Cognitive Impairment    Three Minute Recall (river, nation, finger)   /3    James clock face with all 12 numbers and set the hands to show 10 past 11.         Fall Risk Assessment    Has the patient had two or more falls in the last year or any fall with injury in the last year?  No    Safety Assessment    Throw rugs on floor.  Yes  Handrails on all stairs.  Yes  Good lighting in all hallways.  Yes  Difficulty hearing.  No  Patient counseled about all safety risks that were identified.    Functional Assessment ADLs    Are there any barriers preventing you from cooking for yourself or meeting nutritional needs?  No.    Are there any barriers preventing you from driving safely or obtaining transportation?  No.    Are there any barriers preventing you from using a telephone or calling for help?  No.    Are there any barriers preventing you from shopping?  No.    Are there any barriers preventing you from taking care of your own finances?  No.    Are there any barriers preventing you from managing your medications?  No.    Are there  any barriers preventing you from showering, bathing or dressing yourself?  No.    Are you currently engaging in any exercise or physical activity?  Yes.  Treadmill 3 miles daily  What is your perception of your health?  Good.    Health Maintenance Summary                IMM ZOSTER VACCINES Overdue 3/9/2015      Done 2015 Imm Admin: Zoster Vaccine Live (ZVL) (Zostavax)    Annual Wellness Visit Overdue 2019      Done 2018 Visit Dx: Medicare annual wellness visit, subsequent     Patient has more history with this topic...    IMM INFLUENZA Next Due 2020      Done 2019 Imm Admin: Influenza, Unspecified - Historical Data     Patient has more history with this topic...    COLONOSCOPY Next Due 2023      Done 2018 REFERRAL TO GI FOR COLONOSCOPY     Patient has more history with this topic...    IMM DTaP/Tdap/Td Vaccine Next Due 2027      Done 2017 Imm Admin: Tdap Vaccine          Patient Care Team:  Rosina Agosto M.D. as PCP - General (Family Medicine)  Theodore Guzman M.D. as Consulting Physician (Ophthalmology)  [facility] (Inactive) as Consulting Physician (Gastroenterology)  [facility] as Home Health Provider (DME Supplier)  Becki Kellogg    Social History     Tobacco Use   • Smoking status: Former Smoker     Packs/day: 1.50     Years: 15.00     Pack years: 22.50     Types: Cigarettes     Quit date: 1984     Years since quittin.5   • Smokeless tobacco: Never Used   • Tobacco comment: quit    Substance Use Topics   • Alcohol use: Yes     Alcohol/week: 4.2 oz     Types: 7 Glasses of wine per week     Frequency: 4 or more times a week     Drinks per session: 1 or 2     Binge frequency: Never     Comment: 1 glass a night   • Drug use: No     Family History   Problem Relation Age of Onset   • Heart Attack Father    • No Known Problems Mother    • Cancer Sister 62        colon cancer   • No Known Problems Maternal Grandmother    • No Known Problems  "Maternal Grandfather    • No Known Problems Paternal Grandmother    • No Known Problems Paternal Grandfather    • No Known Problems Brother    • No Known Problems Brother    • No Known Problems Brother    • No Known Problems Brother    • Sleep Apnea Neg Hx      He  has a past medical history of Glaucoma and Hyperlipidemia.   Past Surgical History:   Procedure Laterality Date   • CATARACT PHACO WITH IOL  5/16/2013    Performed by Theodore Guzman M.D. at [facility]   • CATARACT PHACO WITH IOL  5/2/2013    Performed by Theodore Guzman M.D. at [facility]           Exam:     Ht 1.702 m (5' 7\")   Wt 74.8 kg (165 lb)  Body mass index is 25.84 kg/m².    Hearing excellent.    Dentition good  Alert, oriented in no acute distress.  Eye contact is good, speech goal directed, affect calm      Assessment and Plan. The following treatment and monitoring plan is recommended:      1. Prediabetes  Improving with dietary modification, weight loss, and regular exercise.  - recommended dietary modification, exercise, and weight loss.   - avoid alcohol, drugs, tobacco products     2. DELMA on CPAP  Chronic, on CPAP consistently, denies any active daytime symptoms.  He recently lost 20 pounds through dietary modification and exercise.  He reports feeling well.  - Pt was counseled on risks of untreated DELMA including: increased risk of cardiovascular disease including coronary artery disease, systemic arterial hypertension, pulmonary arterial hypertension, cardiac arrythmias, stroke, increased risk of MVA due to drowsiness, type 2 diabetes, chronic kidney disease, and non-alcoholic liver disease.   - recommended weight loss and consistent use of CPAP  - f/u with sleep medicine as directed.      3. Mixed hyperlipidemia  Chronic, controlled with Lipitor 40 mg daily, no s/e reported, will continue.    - recommended dietary modification, exercise, and weight loss.   - avoid alcohol, drugs, tobacco products     4. BPH " (benign prostatic hypertrophy) with urinary retention  Chronic, controlled with tamsulosin 0.4 mg and dutasteride 0.5 mg daily.  Denies any side effects, will continue.  - tamsulosin (FLOMAX) 0.4 MG capsule; Take 1 Cap by mouth every day.  Dispense: 100 Cap; Refill: 3  -Continue dutasteride 0.5 mg daily    5. Chronic nonseasonal allergic rhinitis due to pollen  Chronic, controlled with over-the-counter antihistamine.    6. Recurrent genital herpes  Chronic, takes famciclovir as needed for acute flare.  Patient has not had any flareups since last office visit.    7. Medicare annual wellness visit, subsequent  General health and wellness counseling provided. Topics might include: diet, exercise, vitamin supplement, mental health, sleep, stress, preventive cares and vaccine recommendations.         Services suggested: No services needed at this time  Health Care Screening recommendations as per orders if indicated.  Referrals offered: PT/OT/Nutrition counseling/Behavioral Health/Smoking cessation as per orders if indicated.    Discussion today about general wellness and lifestyle habits:    · Prevent falls and reduce trip hazards; Cautioned about securing or removing rugs.  · Have a working fire alarm and carbon monoxide detector;   · Engage in regular physical activity and social activities       Follow-up: Return in about 6 months (around 2/18/2021) for Multiple issues.

## 2020-08-18 NOTE — LETTER
[employer]  Rosina Agosto M.D.  [address] [facility]   [place] [address]  Fax: [phone]   Authorization for Release/Disclosure of   Protected Health Information   Name: AYO DEE : 1950 SSN: [id number]   Address: [facility] [address] [address] [place] [address] Phone:    There are no phone numbers on file.   I authorize the entity listed below to release/disclose the PHI below to:   [facility]/Rosina Agosto M.D. and Rosina Agosto M.D.   Provider or Entity Name:  Theodore Guzman M.D.   Address   City, State, [facility]   Phone:      Fax:     Reason for request: continuity of care   Information to be released:    [  ] LAST COLONOSCOPY,  including any PATH REPORT and follow-up  [  ] LAST FIT/COLOGUARD RESULT [  ] LAST DEXA  [  ] LAST MAMMOGRAM  [  ] LAST PAP  [  ] LAST LABS [XXX] RETINA EXAM REPORT  [  ] IMMUNIZATION RECORDS  [  ] Release all info      [  ] Check here and initial the line next to each item to release ALL health information INCLUDING  _____ Care and treatment for drug and / or alcohol abuse  _____ HIV testing, infection status, or AIDS  _____ Genetic Testing    DATES OF SERVICE OR TIME PERIOD TO BE DISCLOSED: _____________  I understand and acknowledge that:  * This Authorization may be revoked at any time by you in writing, except if your health information has already been used or disclosed.  * Your health information that will be used or disclosed as a result of you signing this authorization could be re-disclosed by the recipient. If this occurs, your re-disclosed health information may no longer be protected by State or Federal laws.  * You may refuse to sign this Authorization. Your refusal will not affect your ability to obtain treatment.  * This Authorization becomes effective upon signing and will  on (date) __________.      If no date is indicated, this Authorization will  one (1) year from the signature date.    Name: Ayo Dee    Signature: Continuity of Care   Date:          8/18/2020       PLEASE FAX REQUESTED RECORDS BACK TO: [phone]

## 2020-08-18 NOTE — TELEPHONE ENCOUNTER
Phone Number Called: Mobile [phone] & Home [phone]    Call outcome: LVM    Message: Called pt. No answer. LVM for pt to schedule follow up 6 month visit per Dr. Agosto. Advised can schedule appoint through [employer] or [facility]'s scheduling department @ [phone].

## 2020-09-30 NOTE — PROGRESS NOTES
Outcome: Left voicemail/ message    Please transfer to [facility]  [phone] when patient returns call.     [facility] Verified: yes     Attempt # 1

## 2020-10-21 ENCOUNTER — APPOINTMENT (RX ONLY)
Dept: URBAN - METROPOLITAN AREA CLINIC 4 | Facility: CLINIC | Age: 70
Setting detail: DERMATOLOGY
End: 2020-10-21

## 2020-10-21 DIAGNOSIS — L98419 CHRONIC ULCER OF OTHER SPECIFIED SITES: ICD-10-CM

## 2020-10-21 DIAGNOSIS — L81.4 OTHER MELANIN HYPERPIGMENTATION: ICD-10-CM

## 2020-10-21 DIAGNOSIS — L57.8 OTHER SKIN CHANGES DUE TO CHRONIC EXPOSURE TO NONIONIZING RADIATION: ICD-10-CM

## 2020-10-21 DIAGNOSIS — D22 MELANOCYTIC NEVI: ICD-10-CM

## 2020-10-21 DIAGNOSIS — L98429 CHRONIC ULCER OF OTHER SPECIFIED SITES: ICD-10-CM

## 2020-10-21 DIAGNOSIS — L82.1 OTHER SEBORRHEIC KERATOSIS: ICD-10-CM

## 2020-10-21 DIAGNOSIS — D18.0 HEMANGIOMA: ICD-10-CM

## 2020-10-21 PROBLEM — L98.499 NON-PRESSURE CHRONIC ULCER OF SKIN OF OTHER SITES WITH UNSPECIFIED SEVERITY: Status: ACTIVE | Noted: 2020-10-21

## 2020-10-21 PROBLEM — D18.01 HEMANGIOMA OF SKIN AND SUBCUTANEOUS TISSUE: Status: ACTIVE | Noted: 2020-10-21

## 2020-10-21 PROBLEM — D22.5 MELANOCYTIC NEVI OF TRUNK: Status: ACTIVE | Noted: 2020-10-21

## 2020-10-21 PROCEDURE — ? PRESCRIPTION

## 2020-10-21 PROCEDURE — ? COUNSELING

## 2020-10-21 PROCEDURE — ? PHOTO-DOCUMENTATION

## 2020-10-21 PROCEDURE — ? ADDITIONAL NOTES

## 2020-10-21 PROCEDURE — 99202 OFFICE O/P NEW SF 15 MIN: CPT

## 2020-10-21 RX ORDER — MUPIROCIN 20 MG/G
OINTMENT TOPICAL
Qty: 1 | Refills: 0 | Status: ERX | COMMUNITY
Start: 2020-10-21

## 2020-10-21 RX ADMIN — MUPIROCIN 1: 20 OINTMENT TOPICAL at 00:00

## 2020-10-21 ASSESSMENT — LOCATION SIMPLE DESCRIPTION DERM
LOCATION SIMPLE: LEFT HAND
LOCATION SIMPLE: CHEST
LOCATION SIMPLE: RIGHT UPPER BACK
LOCATION SIMPLE: RIGHT FOREARM
LOCATION SIMPLE: LEFT FOREARM
LOCATION SIMPLE: RIGHT HAND
LOCATION SIMPLE: LEFT UPPER BACK
LOCATION SIMPLE: LEFT CHEEK
LOCATION SIMPLE: RIGHT CHEEK

## 2020-10-21 ASSESSMENT — LOCATION DETAILED DESCRIPTION DERM
LOCATION DETAILED: LEFT SUPERIOR UPPER BACK
LOCATION DETAILED: RIGHT PROXIMAL DORSAL FOREARM
LOCATION DETAILED: LEFT INFERIOR CENTRAL MALAR CHEEK
LOCATION DETAILED: RIGHT SUPERIOR MEDIAL UPPER BACK
LOCATION DETAILED: LEFT MEDIAL SUPERIOR CHEST
LOCATION DETAILED: LEFT RADIAL DORSAL HAND
LOCATION DETAILED: LEFT SUPERIOR MEDIAL UPPER BACK
LOCATION DETAILED: RIGHT MID-UPPER BACK
LOCATION DETAILED: RIGHT RADIAL DORSAL HAND
LOCATION DETAILED: RIGHT INFERIOR CENTRAL MALAR CHEEK
LOCATION DETAILED: LEFT DISTAL DORSAL FOREARM

## 2020-10-21 ASSESSMENT — LOCATION ZONE DERM
LOCATION ZONE: TRUNK
LOCATION ZONE: HAND
LOCATION ZONE: FACE
LOCATION ZONE: ARM

## 2020-10-21 NOTE — PROCEDURE: ADDITIONAL NOTES
Detail Level: Simple
Additional Notes: Caused from pt applying Mariola mole corrector/skin tag removal

## 2020-11-11 ENCOUNTER — APPOINTMENT (RX ONLY)
Dept: URBAN - METROPOLITAN AREA CLINIC 4 | Facility: CLINIC | Age: 70
Setting detail: DERMATOLOGY
End: 2020-11-11

## 2020-11-11 DIAGNOSIS — L98429 CHRONIC ULCER OF OTHER SPECIFIED SITES: ICD-10-CM

## 2020-11-11 DIAGNOSIS — L98419 CHRONIC ULCER OF OTHER SPECIFIED SITES: ICD-10-CM

## 2020-11-11 PROBLEM — L98.499 NON-PRESSURE CHRONIC ULCER OF SKIN OF OTHER SITES WITH UNSPECIFIED SEVERITY: Status: ACTIVE | Noted: 2020-11-11

## 2020-11-11 PROCEDURE — ? ADDITIONAL NOTES

## 2020-11-11 PROCEDURE — ? PRESCRIPTION

## 2020-11-11 PROCEDURE — ? PHOTO-DOCUMENTATION

## 2020-11-11 RX ORDER — MUPIROCIN 20 MG/G
OINTMENT TOPICAL
Qty: 1 | Refills: 3 | Status: ERX

## 2021-01-15 DIAGNOSIS — Z23 NEED FOR VACCINATION: ICD-10-CM

## 2021-04-19 DIAGNOSIS — R33.8 BENIGN PROSTATIC HYPERPLASIA WITH URINARY RETENTION: ICD-10-CM

## 2021-04-19 DIAGNOSIS — E78.2 MIXED HYPERLIPIDEMIA: ICD-10-CM

## 2021-04-19 DIAGNOSIS — N40.1 BENIGN PROSTATIC HYPERPLASIA WITH URINARY RETENTION: ICD-10-CM

## 2021-04-19 RX ORDER — DUTASTERIDE 0.5 MG/1
CAPSULE, LIQUID FILLED ORAL
Qty: 100 CAPSULE | Refills: 0 | Status: SHIPPED | OUTPATIENT
Start: 2021-04-19 | End: 2021-06-17 | Stop reason: SDUPTHER

## 2021-04-19 RX ORDER — ATORVASTATIN CALCIUM 40 MG/1
TABLET, FILM COATED ORAL
Qty: 100 TABLET | Refills: 0 | Status: SHIPPED | OUTPATIENT
Start: 2021-04-19 | End: 2021-04-29

## 2021-04-29 DIAGNOSIS — E78.2 MIXED HYPERLIPIDEMIA: ICD-10-CM

## 2021-04-30 RX ORDER — ATORVASTATIN CALCIUM 40 MG/1
TABLET, FILM COATED ORAL
Qty: 100 TABLET | Refills: 0 | Status: SHIPPED | OUTPATIENT
Start: 2021-04-30 | End: 2021-06-17 | Stop reason: SDUPTHER

## 2021-06-10 DIAGNOSIS — E78.2 MIXED HYPERLIPIDEMIA: ICD-10-CM

## 2021-06-10 DIAGNOSIS — R73.03 PREDIABETES: ICD-10-CM

## 2021-06-15 ENCOUNTER — HOSPITAL ENCOUNTER (OUTPATIENT)
Dept: LAB | Facility: MEDICAL CENTER | Age: 71
End: 2021-06-15
Attending: FAMILY MEDICINE
Payer: MEDICARE

## 2021-06-15 DIAGNOSIS — R73.03 PREDIABETES: ICD-10-CM

## 2021-06-15 DIAGNOSIS — E78.2 MIXED HYPERLIPIDEMIA: ICD-10-CM

## 2021-06-15 LAB
ALBUMIN SERPL BCP-MCNC: 3.9 G/DL (ref 3.2–4.9)
ALBUMIN/GLOB SERPL: 1.4 G/DL
ALP SERPL-CCNC: 68 U/L (ref 30–99)
ALT SERPL-CCNC: 22 U/L (ref 2–50)
ANION GAP SERPL CALC-SCNC: 10 MMOL/L (ref 7–16)
AST SERPL-CCNC: 25 U/L (ref 12–45)
BASOPHILS # BLD AUTO: 0.7 % (ref 0–1.8)
BASOPHILS # BLD: 0.03 K/UL (ref 0–0.12)
BILIRUB SERPL-MCNC: 0.5 MG/DL (ref 0.1–1.5)
BUN SERPL-MCNC: 17 MG/DL (ref 8–22)
CALCIUM SERPL-MCNC: 8.9 MG/DL (ref 8.5–10.5)
CHLORIDE SERPL-SCNC: 110 MMOL/L (ref 96–112)
CHOLEST SERPL-MCNC: 118 MG/DL (ref 100–199)
CO2 SERPL-SCNC: 24 MMOL/L (ref 20–33)
CREAT SERPL-MCNC: 0.72 MG/DL (ref 0.5–1.4)
EOSINOPHIL # BLD AUTO: 0.2 K/UL (ref 0–0.51)
EOSINOPHIL NFR BLD: 4.4 % (ref 0–6.9)
ERYTHROCYTE [DISTWIDTH] IN BLOOD BY AUTOMATED COUNT: 48.6 FL (ref 35.9–50)
EST. AVERAGE GLUCOSE BLD GHB EST-MCNC: 120 MG/DL
FASTING STATUS PATIENT QL REPORTED: NORMAL
GLOBULIN SER CALC-MCNC: 2.7 G/DL (ref 1.9–3.5)
GLUCOSE SERPL-MCNC: 94 MG/DL (ref 65–99)
HBA1C MFR BLD: 5.8 % (ref 4–5.6)
HCT VFR BLD AUTO: 46.3 % (ref 42–52)
HDLC SERPL-MCNC: 45 MG/DL
HGB BLD-MCNC: 15 G/DL (ref 14–18)
IMM GRANULOCYTES # BLD AUTO: 0.01 K/UL (ref 0–0.11)
IMM GRANULOCYTES NFR BLD AUTO: 0.2 % (ref 0–0.9)
LDLC SERPL CALC-MCNC: 58 MG/DL
LYMPHOCYTES # BLD AUTO: 1.12 K/UL (ref 1–4.8)
LYMPHOCYTES NFR BLD: 24.8 % (ref 22–41)
MCH RBC QN AUTO: 31.1 PG (ref 27–33)
MCHC RBC AUTO-ENTMCNC: 32.4 G/DL (ref 33.7–35.3)
MCV RBC AUTO: 95.9 FL (ref 81.4–97.8)
MONOCYTES # BLD AUTO: 0.39 K/UL (ref 0–0.85)
MONOCYTES NFR BLD AUTO: 8.6 % (ref 0–13.4)
NEUTROPHILS # BLD AUTO: 2.77 K/UL (ref 1.82–7.42)
NEUTROPHILS NFR BLD: 61.3 % (ref 44–72)
NRBC # BLD AUTO: 0 K/UL
NRBC BLD-RTO: 0 /100 WBC
PLATELET # BLD AUTO: 162 K/UL (ref 164–446)
PMV BLD AUTO: 10.3 FL (ref 9–12.9)
POTASSIUM SERPL-SCNC: 4 MMOL/L (ref 3.6–5.5)
PROT SERPL-MCNC: 6.6 G/DL (ref 6–8.2)
RBC # BLD AUTO: 4.83 M/UL (ref 4.7–6.1)
SODIUM SERPL-SCNC: 144 MMOL/L (ref 135–145)
TRIGL SERPL-MCNC: 76 MG/DL (ref 0–149)
WBC # BLD AUTO: 4.5 K/UL (ref 4.8–10.8)

## 2021-06-15 PROCEDURE — 80053 COMPREHEN METABOLIC PANEL: CPT

## 2021-06-15 PROCEDURE — 85025 COMPLETE CBC W/AUTO DIFF WBC: CPT

## 2021-06-15 PROCEDURE — 36415 COLL VENOUS BLD VENIPUNCTURE: CPT

## 2021-06-15 PROCEDURE — 83036 HEMOGLOBIN GLYCOSYLATED A1C: CPT

## 2021-06-15 PROCEDURE — 80061 LIPID PANEL: CPT

## 2021-06-16 RX ORDER — TRIAMCINOLONE ACETONIDE 1 MG/G
CREAM TOPICAL 2 TIMES DAILY
COMMUNITY
End: 2022-06-28

## 2021-06-16 RX ORDER — PENICILLIN V POTASSIUM 500 MG/1
TABLET ORAL
COMMUNITY
Start: 2021-04-28 | End: 2021-06-17

## 2021-06-16 RX ORDER — HYDROCODONE BITARTRATE AND ACETAMINOPHEN 7.5; 325 MG/1; MG/1
TABLET ORAL
COMMUNITY
Start: 2021-04-15 | End: 2021-06-17

## 2021-06-16 NOTE — PROGRESS NOTES
ESTABLISHED PATIENT PRE-VISIT PLANNING   Wednesday, 06/16/2021@9:57AM:    Phone Number Called: [phone] (home)   Call outcome: Spoke to patient regarding message below.  Message: Advised office visit scheduled with Dr. Agosto, Thursday, 06/17/21 @ 8:30AM, [address]. Patient wants office visit.    Patient was contacted to complete PVP.     Note: Patient will not be contacted if there is no indication to call.     1.  Reviewed notes from the last few office visits within the medical group: Yes    2.  If any orders were placed at last visit or intended to be done for this visit (i.e. 6 mos follow-up), do we have Results/Consult Notes?         •  Labs - Labs ordered, completed on 06/15/2021 and results are in chart.  Note: If patient appointment is for lab review and patient did not complete labs, check with provider if OK to reschedule patient until labs completed.       •  Imaging - Imaging was not ordered at last office visit.       •  Referrals - No referrals were ordered at last office visit.    3. Is this appointment scheduled as a Hospital Follow-Up? No    4.  Immunizations were updated in Epic using Reconcile Outside Information activity? Yes    5.  Patient is due for the following Health Maintenance Topics:   Health Maintenance Due   Topic Date Due   • IMM ZOSTER VACCINES (2 of 3) 03/09/2015       6.  AHA (Pulse[employer]) form printed for Provider? Yes

## 2021-06-17 ENCOUNTER — OFFICE VISIT (OUTPATIENT)
Dept: MEDICAL GROUP | Age: 71
End: 2021-06-17
Payer: MEDICARE

## 2021-06-17 VITALS
BODY MASS INDEX: 26.06 KG/M2 | DIASTOLIC BLOOD PRESSURE: 58 MMHG | SYSTOLIC BLOOD PRESSURE: 100 MMHG | TEMPERATURE: 97.6 F | HEART RATE: 51 BPM | OXYGEN SATURATION: 97 % | HEIGHT: 67 IN | WEIGHT: 166 LBS

## 2021-06-17 DIAGNOSIS — R73.03 PREDIABETES: ICD-10-CM

## 2021-06-17 DIAGNOSIS — A60.00 RECURRENT GENITAL HERPES: ICD-10-CM

## 2021-06-17 DIAGNOSIS — E78.2 MIXED HYPERLIPIDEMIA: ICD-10-CM

## 2021-06-17 DIAGNOSIS — R33.8 BENIGN PROSTATIC HYPERPLASIA WITH URINARY RETENTION: Primary | ICD-10-CM

## 2021-06-17 DIAGNOSIS — G47.33 OSA ON CPAP: ICD-10-CM

## 2021-06-17 DIAGNOSIS — N40.1 BENIGN PROSTATIC HYPERPLASIA WITH URINARY RETENTION: Primary | ICD-10-CM

## 2021-06-17 DIAGNOSIS — J30.89 CHRONIC NONSEASONAL ALLERGIC RHINITIS DUE TO POLLEN: ICD-10-CM

## 2021-06-17 DIAGNOSIS — Z23 NEED FOR VACCINATION: ICD-10-CM

## 2021-06-17 PROCEDURE — 99214 OFFICE O/P EST MOD 30 MIN: CPT | Mod: 25 | Performed by: FAMILY MEDICINE

## 2021-06-17 PROCEDURE — 90750 HZV VACC RECOMBINANT IM: CPT | Performed by: FAMILY MEDICINE

## 2021-06-17 PROCEDURE — 90471 IMMUNIZATION ADMIN: CPT | Performed by: FAMILY MEDICINE

## 2021-06-17 RX ORDER — FAMCICLOVIR 125 MG/1
125 TABLET ORAL 2 TIMES DAILY
Qty: 60 TABLET | Refills: 0 | Status: SHIPPED | OUTPATIENT
Start: 2021-06-17 | End: 2022-06-28

## 2021-06-17 RX ORDER — ATORVASTATIN CALCIUM 40 MG/1
TABLET, FILM COATED ORAL
Qty: 100 TABLET | Refills: 3 | Status: SHIPPED | OUTPATIENT
Start: 2021-06-17 | End: 2022-06-28 | Stop reason: SDUPTHER

## 2021-06-17 RX ORDER — DUTASTERIDE 0.5 MG/1
CAPSULE, LIQUID FILLED ORAL
Qty: 100 CAPSULE | Refills: 3 | Status: SHIPPED | OUTPATIENT
Start: 2021-06-17 | End: 2022-06-28 | Stop reason: SDUPTHER

## 2021-06-17 RX ORDER — TAMSULOSIN HYDROCHLORIDE 0.4 MG/1
0.4 CAPSULE ORAL DAILY
Qty: 100 CAPSULE | Refills: 3 | Status: SHIPPED | OUTPATIENT
Start: 2021-06-17 | End: 2022-06-28 | Stop reason: SDUPTHER

## 2021-06-17 ASSESSMENT — PATIENT HEALTH QUESTIONNAIRE - PHQ9: CLINICAL INTERPRETATION OF PHQ2 SCORE: 0

## 2021-06-17 ASSESSMENT — ENCOUNTER SYMPTOMS: GENERAL WELL-BEING: GOOD

## 2021-06-17 ASSESSMENT — FIBROSIS 4 INDEX: FIB4 SCORE: 2.34

## 2021-06-17 ASSESSMENT — ACTIVITIES OF DAILY LIVING (ADL): BATHING_REQUIRES_ASSISTANCE: 0

## 2021-06-17 NOTE — PROGRESS NOTES
Chief Complaint   Patient presents with   • Medicare Annual Wellness       HPI:  Jamal is a 70 y.o. here for Medicare Annual Wellness Visit    Patient is doing well.  He does not have any acute concerns.    Patient Active Problem List    Diagnosis Date Noted   • FHx: colon cancer 02/18/2020   • Recurrent genital herpes 03/26/2019   • Chronic nonseasonal allergic rhinitis due to pollen 03/27/2018   • Mixed hyperlipidemia 04/04/2017   • BPH (benign prostatic hypertrophy) with urinary retention 04/04/2017   • Prediabetes 04/04/2017   • DELMA on CPAP 05/06/2016       Current Outpatient Medications   Medication Sig Dispense Refill   • tamsulosin (FLOMAX) 0.4 MG capsule Take 1 capsule by mouth every day. 100 capsule 3   • dutasteride (AVODART) 0.5 MG capsule TAKE ONE CAPSULE BY MOUTH ONE TIME DAILY 100 capsule 3   • atorvastatin (LIPITOR) 40 MG Tab TAKE ONE TABLET BY MOUTH AT BEDTIME 100 tablet 3   • famciclovir (FAMVIR) 125 MG Tab Take 1 tablet by mouth 2 times a day. Take for 5 days if symptoms flare up. 60 tablet 0   • triamcinolone acetonide (KENALOG) 0.1 % Cream Apply  topically 2 times a day.     • timolol (TIMOPTIC) 0.5 % Solution INSTILL 1 DROP INTO RIGHT EYE EVERY MORNING  6   • bimatoprost (LUMIGAN) 0.01 % Solution Place 1 Drop in both eyes every bedtime. 1 DROP IN EACH EYE AT BED TIME.       No current facility-administered medications for this visit.        Patient is taking medications as noted in medication list.  Current supplements as per medication list.     Allergies: Other environmental    Current social contact/activities: pt plays golf with friends every week     Is patient current with immunizations? Yes.    He  reports that he quit smoking about 37 years ago. His smoking use included cigarettes. He has a 22.50 pack-year smoking history. He has never used smokeless tobacco. He reports current alcohol use of about 4.2 oz of alcohol per week. He reports that he does not use drugs.  Counseling given:  Not Answered  Comment: quit 1985      DPA/Advanced directive: Patient does not have an Advanced Directive.  A packet and workshop information was given on Advanced Directives.    ROS:    Gait: Uses no assistive device   Ostomy: No   Other tubes: No   Amputations: No   Chronic oxygen: no   Last eye exam coupe years, has appt scheduled for next week  Wears hearing aids: No   : Denies any urinary leakage during the last 6 months    Screening:    Depression Screening  Little interest or pleasure in doing things?  0 - not at all  Feeling down, depressed, or hopeless? 0 - not at all    Screening for Cognitive Impairment  Three Minute Recall (captain, franco, picture)  3/3    Draw clock face with all 12 numbers and set the hands to show 5 past 8.  Yes      Fall Risk Assessment  Has the patient had two or more falls in the last year or any fall with injury in the last year?  No    Safety Assessment  Throw rugs on floor.  No  Handrails on all stairs.  Yes  Good lighting in all hallways.  Yes  Difficulty hearing.  No  Patient counseled about all safety risks that were identified.    Functional Assessment ADLs  Are there any barriers preventing you from cooking for yourself or meeting nutritional needs?  No.    Are there any barriers preventing you from driving safely or obtaining transportation?  No.    Are there any barriers preventing you from using a telephone or calling for help?  No.    Are there any barriers preventing you from shopping?  No.    Are there any barriers preventing you from taking care of your own finances?  No.    Are there any barriers preventing you from managing your medications?    No.    Are there any barriers preventing you from showering, bathing or dressing yourself?  No.    Are you currently engaging in any exercise or physical activity?  Yes.     What is your perception of your health?  Good.    Health Maintenance Summary                IMM ZOSTER VACCINES Next Due 8/12/2021      Done 6/17/2021  Imm Admin: Zoster Vaccine Recombinant (RZV) (SHINGRIX)     Patient has more history with this topic...    Annual Wellness Visit Next Due 2021      Done 2020 LOS: RI ANNUAL WELLNESS VISIT-INCLUDES PPPS SUBSEQUE*     Patient has more history with this topic...    COLONOSCOPY Next Due 2023      Done 2018 REFERRAL TO GI FOR COLONOSCOPY     Patient has more history with this topic...    IMM DTaP/Tdap/Td Vaccine Next Due 2027      Done 2017 Imm Admin: Tdap Vaccine          Patient Care Team:  Rosina Agosto M.D. as PCP - General (Family Medicine)  Theodore Guzman M.D. as Consulting Physician (Ophthalmology)  [facility] as Consulting Physician (Gastroenterology)  [facility] as Home Health Provider (DME Supplier)  Becki Kellogg    Social History     Tobacco Use   • Smoking status: Former Smoker     Packs/day: 1.50     Years: 15.00     Pack years: 22.50     Types: Cigarettes     Quit date: 1984     Years since quittin.4   • Smokeless tobacco: Never Used   • Tobacco comment: quit    Vaping Use   • Vaping Use: Never used   Substance Use Topics   • Alcohol use: Yes     Alcohol/week: 4.2 oz     Types: 7 Glasses of wine per week     Comment: 1 glass a night   • Drug use: No     Family History   Problem Relation Age of Onset   • Heart Attack Father    • No Known Problems Mother    • Cancer Sister 62        colon cancer   • No Known Problems Maternal Grandmother    • No Known Problems Maternal Grandfather    • No Known Problems Paternal Grandmother    • No Known Problems Paternal Grandfather    • No Known Problems Brother    • No Known Problems Brother    • No Known Problems Brother    • No Known Problems Brother    • Sleep Apnea Neg Hx      He  has a past medical history of Glaucoma and Hyperlipidemia.   Past Surgical History:   Procedure Laterality Date   • CATARACT PHACO WITH IOL  2013    Performed by Theodore Guzman M.D. at SURGERY SURGICAL  "[facility] ORS   • CATARACT PHACO WITH IOL  5/2/2013    Performed by Theodore Guzman M.D. at SURGERY SURGICAL [facility] ORS         Exam:   /58 (BP Location: Right arm, Patient Position: Sitting, BP Cuff Size: Adult)   Pulse (!) 51   Temp 36.4 °C (97.6 °F) (Temporal)   Ht 1.702 m (5' 7\")   Wt 75.3 kg (166 lb)   SpO2 97%  Body mass index is 26 kg/m².    Hearing excellent.    Dentition good  Alert, oriented in no acute distress.  Eye contact is good, speech goal directed, affect calm    Assessment and Plan. The following treatment and monitoring plan is recommended:      1. BPH (benign prostatic hypertrophy) with urinary retention  Chronic, controlled with Flomax and Dutasteride, no s/e reported, will continue.    - tamsulosin (FLOMAX) 0.4 MG capsule; Take 1 capsule by mouth every day.  Dispense: 100 capsule; Refill: 3  - dutasteride (AVODART) 0.5 MG capsule; TAKE ONE CAPSULE BY MOUTH ONE TIME DAILY  Dispense: 100 capsule; Refill: 3    2. Prediabetes  Most recent A1C was 5.8.   - Dietary/lifestyle modification and weight loss    - HEMOGLOBIN A1C; Future    3. DELMA on CPAP  - cont CPAP and f/u w/ sleep medicine as directed.     4. Mixed hyperlipidemia  Chronic, controlled with Lipitor 40 mg qd, no s/e reported, will continue.    - atorvastatin (LIPITOR) 40 MG Tab; TAKE ONE TABLET BY MOUTH AT BEDTIME  Dispense: 100 tablet; Refill: 3  - CBC WITH DIFFERENTIAL; Future  - Comp Metabolic Panel; Future  - Lipid Profile; Future    5. Chronic nonseasonal allergic rhinitis due to pollen  Controlled with OTC meds PRN     6. Recurrent genital herpes  - famciclovir (FAMVIR) 125 MG Tab; Take 1 tablet by mouth 2 times a day. Take for 5 days if symptoms flare up.  Dispense: 60 tablet; Refill: 0    7. Need for vaccination  - Shingles Vaccine (SHINGRIX)  - Shingles Vaccine (SHINGRIX); Future      Services suggested: No services needed at this time  Health Care Screening recommendations as per orders if indicated.  Referrals offered: " PT/OT/Nutrition counseling/Behavioral Health/Smoking cessation as per orders if indicated.    Discussion today about general wellness and lifestyle habits:    · Prevent falls and reduce trip hazards; Cautioned about securing or removing rugs.  · Have a working fire alarm and carbon monoxide detector;   · Engage in regular physical activity and social activities     Follow-up: Return in about 1 year (around 6/17/2022) for Annual wellness visit.

## 2021-08-17 ENCOUNTER — SLEEP CENTER VISIT (OUTPATIENT)
Dept: SLEEP MEDICINE | Facility: MEDICAL CENTER | Age: 71
End: 2021-08-17
Payer: MEDICARE

## 2021-08-17 VITALS
RESPIRATION RATE: 16 BRPM | OXYGEN SATURATION: 93 % | DIASTOLIC BLOOD PRESSURE: 64 MMHG | HEART RATE: 53 BPM | SYSTOLIC BLOOD PRESSURE: 118 MMHG | HEIGHT: 67 IN | BODY MASS INDEX: 25.9 KG/M2 | WEIGHT: 165 LBS

## 2021-08-17 DIAGNOSIS — R63.4 WEIGHT LOSS: ICD-10-CM

## 2021-08-17 DIAGNOSIS — G47.33 OSA ON CPAP: ICD-10-CM

## 2021-08-17 DIAGNOSIS — Z87.891 FORMER SMOKER: ICD-10-CM

## 2021-08-17 PROCEDURE — 99213 OFFICE O/P EST LOW 20 MIN: CPT | Performed by: NURSE PRACTITIONER

## 2021-08-17 ASSESSMENT — FIBROSIS 4 INDEX: FIB4 SCORE: 2.34

## 2021-08-17 NOTE — PROGRESS NOTES
Chief Complaint   Patient presents with   • Apnea     last seen 8/17/2020       HPI:  Jamal Dee is a 70 y.o. year old male here today for follow-up on DELMA.  Last OV 8/17/20     Currently using CPAP @ 9cm H20 nightly; RSEMD; device obtained 2016.  Compliance report 7/18/21-8/16/21 notes 100% compliance, avg nightly use of 7hr 13min, minimal mask leak with reduced AHI 4.6/hr. He tolerates mask and pressure well. Denies AM headaches. He notes consistent daytime energy levels. He golfs 1x per week and notes napping after. He overall sleeps through the night and feels he sleeps well and feels rested.  Denies cardiac or respiratory symptoms.    Sleep hx:   PSG 5/16 indicates severe DELMA with an AHI 55.6/h O2 jake 60%.  He was successfully titrated to CPAP 10 cm but had intolerance issues and pressures dropped to 6 cm with elevated AHI currently using CPAP 8 cm H2O nightly. Device obtained in 2016.     ROS: As per HPI and otherwise negative if not stated.    Past Medical History:   Diagnosis Date   • Glaucoma    • Hyperlipidemia        Past Surgical History:   Procedure Laterality Date   • CATARACT PHACO WITH IOL  5/16/2013    Performed by Theodore Guzman M.D. at SURGERY SURG[facility] ORS   • CATARACT PHACO WITH IOL  5/2/2013    Performed by Theodore Guzman M.D. at SURGERY SURG[facility] ORS       Family History   Problem Relation Age of Onset   • Heart Attack Father    • No Known Problems Mother    • Cancer Sister 62        colon cancer   • No Known Problems Maternal Grandmother    • No Known Problems Maternal Grandfather    • No Known Problems Paternal Grandmother    • No Known Problems Paternal Grandfather    • No Known Problems Brother    • No Known Problems Brother    • No Known Problems Brother    • No Known Problems Brother    • Sleep Apnea Neg Hx        Social History     Socioeconomic History   • Marital status:      Spouse name: Not on file   • Number of children: 2   • Years of education: Not on  "file   • Highest education level: Not on file   Occupational History   • Not on file   Tobacco Use   • Smoking status: Former Smoker     Packs/day: 1.50     Years: 15.00     Pack years: 22.50     Types: Cigarettes     Quit date: 1984     Years since quittin.5   • Smokeless tobacco: Never Used   • Tobacco comment: quit    Vaping Use   • Vaping Use: Never used   Substance and Sexual Activity   • Alcohol use: Yes     Alcohol/week: 4.2 oz     Types: 7 Glasses of wine per week     Comment: 1 glass a night   • Drug use: No   • Sexual activity: Not Currently     Partners: Female   Other Topics Concern   • Not on file   Social History Narrative   • Not on file     Social Determinants of Health     Financial Resource Strain:    • Difficulty of Paying Living Expenses:    Food Insecurity:    • Worried About Running Out of Food in the Last Year:    • Ran Out of Food in the Last Year:    Transportation Needs:    • Lack of Transportation (Medical):    • Lack of Transportation (Non-Medical):    Physical Activity:    • Days of Exercise per Week:    • Minutes of Exercise per Session:    Stress:    • Feeling of Stress :    Social Connections:    • Frequency of Communication with Friends and Family:    • Frequency of Social Gatherings with Friends and Family:    • Attends Hindu Services:    • Active Member of Clubs or Organizations:    • Attends Club or Organization Meetings:    • Marital Status:    Intimate Partner Violence:    • Fear of Current or Ex-Partner:    • Emotionally Abused:    • Physically Abused:    • Sexually Abused:        Allergies as of 2021 - Reviewed 2021   Allergen Reaction Noted   • Other environmental Unspecified 2017        Vitals:  /64 (BP Location: Left arm, Patient Position: Sitting, BP Cuff Size: Adult)   Pulse (!) 53   Resp 16   Ht 1.702 m (5' 7\")   Wt 74.8 kg (165 lb)   SpO2 93%     Current medications as of today   Current Outpatient Medications   Medication " Sig Dispense Refill   • tamsulosin (FLOMAX) 0.4 MG capsule Take 1 capsule by mouth every day. 100 capsule 3   • dutasteride (AVODART) 0.5 MG capsule TAKE ONE CAPSULE BY MOUTH ONE TIME DAILY 100 capsule 3   • atorvastatin (LIPITOR) 40 MG Tab TAKE ONE TABLET BY MOUTH AT BEDTIME 100 tablet 3   • famciclovir (FAMVIR) 125 MG Tab Take 1 tablet by mouth 2 times a day. Take for 5 days if symptoms flare up. 60 tablet 0   • triamcinolone acetonide (KENALOG) 0.1 % Cream Apply  topically 2 times a day.     • timolol (TIMOPTIC) 0.5 % Solution INSTILL 1 DROP INTO RIGHT EYE EVERY MORNING  6   • bimatoprost (LUMIGAN) 0.01 % Solution Place 1 Drop in both eyes every bedtime. 1 DROP IN EACH EYE AT BED TIME.       No current facility-administered medications for this visit.         Physical Exam:   Gen:           Alert and oriented, No apparent distress. Mood and affect appropriate, normal interaction with examiner.  Eyes:          PERRL, EOM intact, sclere white, conjunctive moist. Glasses.  Ears:          Not examined.   Hearing:     Grossly intact.  Nose:          Normal, no lesions or deformities.  Dentition:    mask  Oropharynx:   mask  Mallampati Classification: mask  Neck:        Supple, trachea midline, no masses.  Respiratory Effort: No intercostal retractions or use of accessory muscles.   Lung Auscultation:      Clear to auscultation bilaterally; no rales, rhonchi or wheezing.  CV:            Regular rate and rhythm. No murmurs, rubs or gallops.  Abd:           Not examined.   Lymphadenopathy: Not examined.  Gait and Station: Normal.  Digits and Nails: No clubbing, cyanosis, petechiae, or nodes.   Cranial Nerves: II-XII grossly intact.  Skin:        No rashes, lesions or ulcers noted.               Ext:           No cyanosis or edema.      Assessment:  1. DELMA on CPAP     2. Weight loss     3. BMI 25.0-25.9,adult     4. Former smoker         Immunizations:    Flu:recommend in the fall  Pneumovax 23:2018  Prevnar  13:2017  COVID-19: 2/19/21, 1/22/21    Plan:  1. DELMA is clinically stable. Patient continues to benefit from CPAP therapy. Continue CPAP 9cm nightly.  DME mask/supplies  2. Discussed sleep hygiene  3. F/u with PCP for other health concersn  4. Weight is stable but previously loss 16lbs from original sleep study  5. F/u in 1 year with compliance report, sooner if needed.    Please note that this dictation was created using voice recognition software. I have made every reasonable attempt to correct obvious errors, but it is possible there are errors of grammar and possibly content that I did not discover before finalizing the note.

## 2021-08-22 ENCOUNTER — PATIENT MESSAGE (OUTPATIENT)
Dept: HEALTH INFORMATION MANAGEMENT | Facility: OTHER | Age: 71
End: 2021-08-22

## 2021-09-15 ENCOUNTER — APPOINTMENT (OUTPATIENT)
Dept: MEDICAL GROUP | Age: 71
End: 2021-09-15
Payer: MEDICARE

## 2022-03-11 ENCOUNTER — PATIENT MESSAGE (OUTPATIENT)
Dept: HEALTH INFORMATION MANAGEMENT | Facility: OTHER | Age: 72
End: 2022-03-11
Payer: MEDICARE

## 2022-06-08 ENCOUNTER — HOSPITAL ENCOUNTER (OUTPATIENT)
Dept: LAB | Facility: MEDICAL CENTER | Age: 72
End: 2022-06-08
Attending: FAMILY MEDICINE
Payer: MEDICARE

## 2022-06-08 DIAGNOSIS — E78.2 MIXED HYPERLIPIDEMIA: ICD-10-CM

## 2022-06-08 DIAGNOSIS — R73.03 PREDIABETES: ICD-10-CM

## 2022-06-08 LAB
ALBUMIN SERPL BCP-MCNC: 4.1 G/DL (ref 3.2–4.9)
ALBUMIN/GLOB SERPL: 1.6 G/DL
ALP SERPL-CCNC: 59 U/L (ref 30–99)
ALT SERPL-CCNC: 26 U/L (ref 2–50)
ANION GAP SERPL CALC-SCNC: 8 MMOL/L (ref 7–16)
AST SERPL-CCNC: 27 U/L (ref 12–45)
BASOPHILS # BLD AUTO: 0.7 % (ref 0–1.8)
BASOPHILS # BLD: 0.03 K/UL (ref 0–0.12)
BILIRUB SERPL-MCNC: 0.7 MG/DL (ref 0.1–1.5)
BUN SERPL-MCNC: 16 MG/DL (ref 8–22)
CALCIUM SERPL-MCNC: 8.8 MG/DL (ref 8.5–10.5)
CHLORIDE SERPL-SCNC: 109 MMOL/L (ref 96–112)
CHOLEST SERPL-MCNC: 141 MG/DL (ref 100–199)
CO2 SERPL-SCNC: 27 MMOL/L (ref 20–33)
CREAT SERPL-MCNC: 0.76 MG/DL (ref 0.5–1.4)
EOSINOPHIL # BLD AUTO: 0.24 K/UL (ref 0–0.51)
EOSINOPHIL NFR BLD: 5.7 % (ref 0–6.9)
ERYTHROCYTE [DISTWIDTH] IN BLOOD BY AUTOMATED COUNT: 48.6 FL (ref 35.9–50)
EST. AVERAGE GLUCOSE BLD GHB EST-MCNC: 123 MG/DL
FASTING STATUS PATIENT QL REPORTED: NORMAL
GFR SERPLBLD CREATININE-BSD FMLA CKD-EPI: 95 ML/MIN/1.73 M 2
GLOBULIN SER CALC-MCNC: 2.6 G/DL (ref 1.9–3.5)
GLUCOSE SERPL-MCNC: 96 MG/DL (ref 65–99)
HBA1C MFR BLD: 5.9 % (ref 4–5.6)
HCT VFR BLD AUTO: 48.1 % (ref 42–52)
HDLC SERPL-MCNC: 50 MG/DL
HGB BLD-MCNC: 15.4 G/DL (ref 14–18)
IMM GRANULOCYTES # BLD AUTO: 0.02 K/UL (ref 0–0.11)
IMM GRANULOCYTES NFR BLD AUTO: 0.5 % (ref 0–0.9)
LDLC SERPL CALC-MCNC: 70 MG/DL
LYMPHOCYTES # BLD AUTO: 1.15 K/UL (ref 1–4.8)
LYMPHOCYTES NFR BLD: 27.2 % (ref 22–41)
MCH RBC QN AUTO: 30.9 PG (ref 27–33)
MCHC RBC AUTO-ENTMCNC: 32 G/DL (ref 33.7–35.3)
MCV RBC AUTO: 96.4 FL (ref 81.4–97.8)
MONOCYTES # BLD AUTO: 0.35 K/UL (ref 0–0.85)
MONOCYTES NFR BLD AUTO: 8.3 % (ref 0–13.4)
NEUTROPHILS # BLD AUTO: 2.44 K/UL (ref 1.82–7.42)
NEUTROPHILS NFR BLD: 57.6 % (ref 44–72)
NRBC # BLD AUTO: 0 K/UL
NRBC BLD-RTO: 0 /100 WBC
PLATELET # BLD AUTO: 186 K/UL (ref 164–446)
PMV BLD AUTO: 10.5 FL (ref 9–12.9)
POTASSIUM SERPL-SCNC: 4.4 MMOL/L (ref 3.6–5.5)
PROT SERPL-MCNC: 6.7 G/DL (ref 6–8.2)
RBC # BLD AUTO: 4.99 M/UL (ref 4.7–6.1)
SODIUM SERPL-SCNC: 144 MMOL/L (ref 135–145)
TRIGL SERPL-MCNC: 103 MG/DL (ref 0–149)
WBC # BLD AUTO: 4.2 K/UL (ref 4.8–10.8)

## 2022-06-08 PROCEDURE — 80061 LIPID PANEL: CPT

## 2022-06-08 PROCEDURE — 83036 HEMOGLOBIN GLYCOSYLATED A1C: CPT

## 2022-06-08 PROCEDURE — 80053 COMPREHEN METABOLIC PANEL: CPT

## 2022-06-08 PROCEDURE — 36415 COLL VENOUS BLD VENIPUNCTURE: CPT

## 2022-06-08 PROCEDURE — 85025 COMPLETE CBC W/AUTO DIFF WBC: CPT

## 2022-06-25 SDOH — ECONOMIC STABILITY: HOUSING INSECURITY
IN THE LAST 12 MONTHS, WAS THERE A TIME WHEN YOU DID NOT HAVE A STEADY PLACE TO SLEEP OR SLEPT IN A SHELTER (INCLUDING NOW)?: NO

## 2022-06-25 SDOH — HEALTH STABILITY: MENTAL HEALTH

## 2022-06-25 SDOH — ECONOMIC STABILITY: INCOME INSECURITY: IN THE LAST 12 MONTHS, WAS THERE A TIME WHEN YOU WERE NOT ABLE TO PAY THE MORTGAGE OR RENT ON TIME?: NO

## 2022-06-25 SDOH — HEALTH STABILITY: PHYSICAL HEALTH

## 2022-06-25 SDOH — ECONOMIC STABILITY: TRANSPORTATION INSECURITY

## 2022-06-25 SDOH — ECONOMIC STABILITY: HOUSING INSECURITY

## 2022-06-25 ASSESSMENT — LIFESTYLE VARIABLES
HOW MANY STANDARD DRINKS CONTAINING ALCOHOL DO YOU HAVE ON A TYPICAL DAY: 1 OR 2
AUDIT-C TOTAL SCORE: 4
HOW OFTEN DO YOU HAVE SIX OR MORE DRINKS ON ONE OCCASION: NEVER
SKIP TO QUESTIONS 9-10: 1
HOW OFTEN DO YOU HAVE A DRINK CONTAINING ALCOHOL: 4 OR MORE TIMES A WEEK

## 2022-06-25 ASSESSMENT — SOCIAL DETERMINANTS OF HEALTH (SDOH)
HOW OFTEN DO YOU HAVE A DRINK CONTAINING ALCOHOL: 4 OR MORE TIMES A WEEK
HOW OFTEN DO YOU HAVE SIX OR MORE DRINKS ON ONE OCCASION: NEVER
HOW MANY DRINKS CONTAINING ALCOHOL DO YOU HAVE ON A TYPICAL DAY WHEN YOU ARE DRINKING: 1 OR 2

## 2022-06-28 ENCOUNTER — OFFICE VISIT (OUTPATIENT)
Dept: MEDICAL GROUP | Age: 72
End: 2022-06-28
Payer: MEDICARE

## 2022-06-28 VITALS
TEMPERATURE: 96.7 F | HEART RATE: 55 BPM | WEIGHT: 168 LBS | DIASTOLIC BLOOD PRESSURE: 66 MMHG | SYSTOLIC BLOOD PRESSURE: 124 MMHG | HEIGHT: 67 IN | BODY MASS INDEX: 26.37 KG/M2 | OXYGEN SATURATION: 95 %

## 2022-06-28 DIAGNOSIS — N40.1 BENIGN PROSTATIC HYPERPLASIA WITH URINARY RETENTION: ICD-10-CM

## 2022-06-28 DIAGNOSIS — J30.89 CHRONIC NONSEASONAL ALLERGIC RHINITIS DUE TO POLLEN: ICD-10-CM

## 2022-06-28 DIAGNOSIS — Z80.0 FHX: COLON CANCER: ICD-10-CM

## 2022-06-28 DIAGNOSIS — R33.8 BENIGN PROSTATIC HYPERPLASIA WITH URINARY RETENTION: ICD-10-CM

## 2022-06-28 DIAGNOSIS — G47.33 OSA ON CPAP: ICD-10-CM

## 2022-06-28 DIAGNOSIS — E78.2 MIXED HYPERLIPIDEMIA: ICD-10-CM

## 2022-06-28 DIAGNOSIS — Z00.00 MEDICARE ANNUAL WELLNESS VISIT, SUBSEQUENT: Primary | ICD-10-CM

## 2022-06-28 DIAGNOSIS — A60.00 RECURRENT GENITAL HERPES: ICD-10-CM

## 2022-06-28 DIAGNOSIS — R73.03 PREDIABETES: ICD-10-CM

## 2022-06-28 PROCEDURE — G0439 PPPS, SUBSEQ VISIT: HCPCS | Performed by: FAMILY MEDICINE

## 2022-06-28 RX ORDER — TAMSULOSIN HYDROCHLORIDE 0.4 MG/1
0.4 CAPSULE ORAL DAILY
Qty: 100 CAPSULE | Refills: 3 | Status: SHIPPED | OUTPATIENT
Start: 2022-06-28 | End: 2023-06-06 | Stop reason: SDUPTHER

## 2022-06-28 RX ORDER — ATORVASTATIN CALCIUM 40 MG/1
TABLET, FILM COATED ORAL
Qty: 100 TABLET | Refills: 3 | Status: SHIPPED | OUTPATIENT
Start: 2022-06-28 | End: 2023-06-06 | Stop reason: SDUPTHER

## 2022-06-28 RX ORDER — DUTASTERIDE 0.5 MG/1
CAPSULE, LIQUID FILLED ORAL
Qty: 100 CAPSULE | Refills: 3 | Status: SHIPPED | OUTPATIENT
Start: 2022-06-28 | End: 2023-06-06 | Stop reason: SDUPTHER

## 2022-06-28 RX ORDER — FAMCICLOVIR 125 MG/1
125 TABLET ORAL 2 TIMES DAILY
Qty: 60 TABLET | Refills: 0 | Status: SHIPPED | OUTPATIENT
Start: 2022-06-28 | End: 2023-06-06 | Stop reason: SDUPTHER

## 2022-06-28 RX ORDER — METFORMIN HYDROCHLORIDE 500 MG/1
500 TABLET, EXTENDED RELEASE ORAL 2 TIMES DAILY
Qty: 100 TABLET | Refills: 3 | Status: SHIPPED | OUTPATIENT
Start: 2022-06-28 | End: 2022-11-11 | Stop reason: SDUPTHER

## 2022-06-28 ASSESSMENT — ACTIVITIES OF DAILY LIVING (ADL): BATHING_REQUIRES_ASSISTANCE: 0

## 2022-06-28 ASSESSMENT — FIBROSIS 4 INDEX: FIB4 SCORE: 2.05

## 2022-06-28 ASSESSMENT — PATIENT HEALTH QUESTIONNAIRE - PHQ9: CLINICAL INTERPRETATION OF PHQ2 SCORE: 0

## 2022-06-28 ASSESSMENT — ENCOUNTER SYMPTOMS: GENERAL WELL-BEING: GOOD

## 2022-06-28 NOTE — PROGRESS NOTES
Chief Complaint   Patient presents with   • Medicare Annual Wellness       HPI:  Jamal Dee is a 71 y.o. here for Medicare Annual Wellness Visit     Patient is doing well.   He does treadmill 60 minutes, 3 times per week.   He is thinking to join a gym.   He tolerates all medications well, no side effects reported.     Patient Active Problem List    Diagnosis Date Noted   • FHx: colon cancer 02/18/2020   • Recurrent genital herpes 03/26/2019   • Chronic nonseasonal allergic rhinitis due to pollen 03/27/2018   • Mixed hyperlipidemia 04/04/2017   • BPH (benign prostatic hypertrophy) with urinary retention 04/04/2017   • Prediabetes 04/04/2017   • DELMA on CPAP 05/06/2016       Current Outpatient Medications   Medication Sig Dispense Refill   • atorvastatin (LIPITOR) 40 MG Tab TAKE ONE TABLET BY MOUTH AT BEDTIME 100 Tablet 3   • dutasteride (AVODART) 0.5 MG capsule TAKE ONE CAPSULE BY MOUTH ONE TIME DAILY 100 Capsule 3   • tamsulosin (FLOMAX) 0.4 MG capsule Take 1 Capsule by mouth every day. 100 Capsule 3   • metFORMIN ER (GLUCOPHAGE XR) 500 MG TABLET SR 24 HR Take 1 Tablet by mouth 2 times a day. 100 Tablet 3   • famciclovir (FAMVIR) 125 MG Tab Take 1 Tablet by mouth 2 times a day. Take for 5 days if symptoms flare up. 60 Tablet 0   • timolol (TIMOPTIC) 0.5 % Solution INSTILL 1 DROP INTO RIGHT EYE EVERY MORNING  6   • bimatoprost (LUMIGAN) 0.01 % Solution Place 1 Drop in both eyes every bedtime. 1 DROP IN EACH EYE AT BED TIME.       No current facility-administered medications for this visit.          Current supplements as per medication list.     Allergies: Other environmental    Current social contact/activities: golf     He  reports that he quit smoking about 38 years ago. His smoking use included cigarettes. He has a 22.50 pack-year smoking history. He has never used smokeless tobacco. He reports current alcohol use of about 4.2 oz of alcohol per week. He reports that he does not use drugs.  Counseling  given: Not Answered  Comment: quit 1985      DPA/Advanced Directive:  Patient does not have an Advanced Directive.  A packet and workshop information was given on Advanced Directives.    ROS:    Gait: Uses no assistive device  Ostomy: No  Other tubes: No  Amputations: No  Chronic oxygen use: No  Last eye exam: 2 months ago  Wears hearing aids: No   : Reports urinary leakage during the last 6 months that has not interfered at all with their daily activities or sleep.    Screening:  Depression Screening  Little interest or pleasure in doing things?  0 - not at all  Feeling down, depressed , or hopeless? 0 - not at all  Patient Health Questionnaire Score: 0     If depressive symptoms identified deferred to follow up visit unless specifically addressed in assessment and plan.    Screening for Cognitive Impairment  Three Minute Recall (daughter, heaven, mountain) 3/3    James clock face with all 12 numbers and set the hands to show 10 past 11.  Yes    Cognitive concerns identified deferred for follow up unless specifically addressed in assessment and plan.    Fall Risk Assessment  Has the patient had two or more falls in the last year or any fall with injury in the last year?  No    Safety Assessment  Throw rugs on floor.  Yes  Handrails on all stairs.  Yes  Good lighting in all hallways.  Yes  Difficulty hearing.  No  Patient counseled about all safety risks that were identified.    Functional Assessment ADLs  Are there any barriers preventing you from cooking for yourself or meeting nutritional needs?  No.    Are there any barriers preventing you from driving safely or obtaining transportation?  No.    Are there any barriers preventing you from using a telephone or calling for help?  No.    Are there any barriers preventing you from shopping?  No.    Are there any barriers preventing you from taking care of your own finances?  No.    Are there any barriers preventing you from managing your medications?  No.    Are  there any barriers preventing you from showering, bathing or dressing yourself?  No.    Are you currently engaging in any exercise or physical activity?  Yes.     What is your perception of your health?  Good.      Health Maintenance Summary          Annual Wellness Visit (Every 366 Days) Next due on 6/29/2023 06/28/2022  Visit Dx: Medicare annual wellness visit, subsequent    06/28/2022  Level of Service: ANNUAL WELLNESS VISIT-INCLUDES PPPS SUBSEQUE*    08/18/2020  Level of Service: [place] ANNUAL WELLNESS VISIT-INCLUDES PPPS SUBSEQUE*    08/18/2020  Visit Dx: Medicare annual wellness visit, subsequent    09/18/2018  Visit Dx: Medicare annual wellness visit, subsequent    Only the first 5 history entries have been loaded, but more history exists.          COLORECTAL CANCER SCREENING (COLONOSCOPY - Every 5 Years) Next due on 12/18/2023 12/18/2018  REFERRAL TO GI FOR COLONOSCOPY    11/11/2013  REFERRAL TO GI FOR COLONOSCOPY          IMM DTaP/Tdap/Td Vaccine (2 - Td or Tdap) Next due on 9/6/2027 09/06/2017  Imm Admin: Tdap Vaccine          HEPATITIS C SCREENING  Completed    06/01/2017  HEP C VIRUS ANTIBODY          IMM PNEUMOCOCCAL VACCINE: 65+ Years (Series Information) Completed    09/18/2018  Imm Admin: Pneumococcal polysaccharide vaccine (PPSV-23)    06/06/2017  Imm Admin: Pneumococcal Conjugate Vaccine (Prevnar/PCV-13)          IMM HEP B VACCINE (Series Information) Aged Out    11/12/2019  Imm Admin: Hepatitis B Vaccine (Adol/Adult)    05/30/2018  Imm Admin: Hepatitis B Vaccine (Adol/Adult)    03/27/2018  Imm Admin: Hepatitis B Vaccine (Adol/Adult)          IMM INFLUENZA (Series Information) Completed    09/14/2021  Imm Admin: Influenza, Unspecified - HISTORICAL DATA    09/22/2020  Imm Admin: Influenza Vaccine Quad Inj (Pf)    09/22/2020  Imm Admin: Influenza, Unspecified - HISTORICAL DATA    09/24/2019  Imm Admin: Influenza, Unspecified - HISTORICAL DATA    09/18/2018  Imm Admin: Influenza Vaccine Adult HD     Only the first 5 history entries have been loaded, but more history exists.          IMM ZOSTER VACCINES (Series Information) Completed    2021  Imm Admin: Zoster Vaccine Recombinant (RZV) (SHINGRIX)    2021  Imm Admin: Zoster Vaccine Recombinant (RZV) (SHINGRIX)    2015  Imm Admin: Zoster Vaccine Live (ZVL) (Zostavax) - HISTORICAL DATA          COVID-19 Vaccine (Series Information) Completed    2022  Imm Admin: Moderna SARS-CoV-2 Vaccine    2021  Imm Admin: Moderna SARS-CoV-2 Vaccine    2021  Imm Admin: Moderna SARS-CoV-2 Vaccine    2021  Imm Admin: Moderna SARS-CoV-2 Vaccine          IMM MENINGOCOCCAL VACCINE (MCV4) (Series Information) Aged Out    No completion history exists for this topic.                Patient Care Team:  Rosina Agosto M.D. as PCP - General (Family Medicine)  Maya Gage M.D. as PCP - [facility] Paneled  Theodore Guzman M.D. as Consulting Physician (Ophthalmology)  [facility] (Inactive) as Consulting Physician (Gastroenterology)  [facility] as Home Health Provider (DME Supplier)  Becki Kellogg (Inactive)        Social History     Tobacco Use   • Smoking status: Former Smoker     Packs/day: 1.50     Years: 15.00     Pack years: 22.50     Types: Cigarettes     Quit date: 1984     Years since quittin.4   • Smokeless tobacco: Never Used   • Tobacco comment: quit    Vaping Use   • Vaping Use: Never used   Substance Use Topics   • Alcohol use: Yes     Alcohol/week: 4.2 oz     Types: 7 Glasses of wine per week     Comment: 1 glass a night   • Drug use: No     Family History   Problem Relation Age of Onset   • Heart Attack Father    • No Known Problems Mother    • Cancer Sister 62        colon cancer   • No Known Problems Maternal Grandmother    • No Known Problems Maternal Grandfather    • No Known Problems Paternal Grandmother    • No Known Problems Paternal Grandfather    • No Known Problems Brother    • No Known  "Problems Brother    • No Known Problems Brother    • No Known Problems Brother    • Sleep Apnea Neg Hx      He  has a past medical history of Glaucoma and Hyperlipidemia.   Past Surgical History:   Procedure Laterality Date   • CATARACT PHACO WITH IOL  5/16/2013    Performed by Theodore Guzman M.D. at [facility]   • CATARACT PHACO WITH IOL  5/2/2013    Performed by Theodore Guzman M.D. at [facility]       Exam:   /66 (BP Location: Right arm, Patient Position: Sitting, BP Cuff Size: Adult)   Pulse (!) 55   Temp 35.9 °C (96.7 °F) (Temporal)   Ht 1.702 m (5' 7\")   Wt 76.2 kg (168 lb)   SpO2 95%  Body mass index is 26.31 kg/m².    Hearing excellent.    Dentition good  Alert, oriented in no acute distress.  Eye contact is good, speech goal directed, affect calm    Assessment and Plan. The following treatment and monitoring plan is recommended:      1. Medicare annual wellness visit, subsequent  General health and wellness counseling provided.     Vaccines up-to-date     2. Mixed hyperlipidemia  Chronic, controlled with Lipitor 40 mg qd, no s/e reported, will continue.    - CBC WITH DIFFERENTIAL; Future  - Comp Metabolic Panel; Future  - Lipid Profile; Future  - atorvastatin (LIPITOR) 40 MG Tab; TAKE ONE TABLET BY MOUTH AT BEDTIME  Dispense: 100 Tablet; Refill: 3  - dietary modification, exercise, and weight loss.   - avoid alcohol, drugs, tobacco products     3. DELMA on CPAP  - continue CPAP and follow up with sleep medicine as directed.     4. Prediabetes  Most recent A1C was 5.9  He is asymptomatic. He exercises 180  Minutes per week.   Discussed treatment options, he is interested in Metformin.   - HEMOGLOBIN A1C; Future  - metFORMIN ER (GLUCOPHAGE XR) 500 MG TABLET SR 24 HR; Take 1 Tablet by mouth 2 times a day.  Dispense: 100 Tablet; Refill: 3    5. BPH (benign prostatic hypertrophy) with urinary retention  - dutasteride (AVODART) 0.5 MG capsule; TAKE ONE CAPSULE BY MOUTH ONE " TIME DAILY  Dispense: 100 Capsule; Refill: 3  - tamsulosin (FLOMAX) 0.4 MG capsule; Take 1 Capsule by mouth every day.  Dispense: 100 Capsule; Refill: 3    6. Chronic nonseasonal allergic rhinitis due to pollen  Controlled with over-the-counter antihistamine     7. Recurrent genital herpes  Rarely symptomatic, takes Famvir PRN for outbreak.   - famciclovir (FAMVIR) 125 MG Tab; Take 1 Tablet by mouth 2 times a day. Take for 5 days if symptoms flare up.  Dispense: 60 Tablet; Refill: 0    8. FHx: colon cancer  Sister  from colon cancer at 58.   Negative colonoscopy in 2018  Repeat colonoscopy in 2023.      Services suggested: No services needed at this time  Health Care Screening: Age-appropriate preventive services recommended by USPTF and ACIP covered by Medicare were discussed today. Services ordered if indicated and agreed upon by the patient.  Referrals offered: Community-based lifestyle interventions to reduce health risks and promote self-management and wellness, fall prevention, nutrition, physical activity, tobacco-use cessation, weight loss, and mental health services as per orders if indicated.    Discussion today about general wellness and lifestyle habits:    · Prevent falls and reduce trip hazards; Cautioned about securing or removing rugs.  · Have a working fire alarm and carbon monoxide detector;   · Engage in regular physical activity and social activities     Follow-up: Return in about 1 year (around 2023) for Annual wellness visit.

## 2022-07-20 ENCOUNTER — PATIENT MESSAGE (OUTPATIENT)
Dept: HEALTH INFORMATION MANAGEMENT | Facility: OTHER | Age: 72
End: 2022-07-20

## 2022-07-20 ENCOUNTER — TELEPHONE (OUTPATIENT)
Dept: HEALTH INFORMATION MANAGEMENT | Facility: OTHER | Age: 72
End: 2022-07-20
Payer: MEDICARE

## 2022-08-16 ENCOUNTER — OFFICE VISIT (OUTPATIENT)
Dept: SLEEP MEDICINE | Facility: MEDICAL CENTER | Age: 72
End: 2022-08-16
Payer: MEDICARE

## 2022-08-16 VITALS
SYSTOLIC BLOOD PRESSURE: 110 MMHG | HEIGHT: 67 IN | DIASTOLIC BLOOD PRESSURE: 60 MMHG | OXYGEN SATURATION: 94 % | RESPIRATION RATE: 16 BRPM | BODY MASS INDEX: 26.21 KG/M2 | WEIGHT: 167 LBS | HEART RATE: 61 BPM

## 2022-08-16 DIAGNOSIS — Z87.891 FORMER SMOKER: ICD-10-CM

## 2022-08-16 DIAGNOSIS — G47.33 OSA ON CPAP: ICD-10-CM

## 2022-08-16 PROCEDURE — 99213 OFFICE O/P EST LOW 20 MIN: CPT | Performed by: NURSE PRACTITIONER

## 2022-08-16 ASSESSMENT — FIBROSIS 4 INDEX: FIB4 SCORE: 2.05

## 2022-08-16 NOTE — PROGRESS NOTES
Chief Complaint   Patient presents with    Follow-Up     last seen 8/17/2021       HPI:  Jamal Dee is a 71 y.o. year old male here today for follow-up on DELMA.  Last OV 8/17/21     Currently using CPAP @ 9cm H20 nightly; RESMED; device obtained 5/2016. He would like to replace his device.  Compliance report 3/27/22-4/25/22 indicates 100% compliance, avg nightly use of 7hr 51min, minimal to moderate mask leak, with overall AHI 5.3/hr. Reviewed with patient. He notes ongoing consistent use since date on report. He feels he sleeps well and denies any other symptoms. He tolerates mask and pressure. No AM headaches. He will nap after golfing for 1 hr but does not use CPAP; reviewed proper use and sleep hygiene. He denies cardiac or respiratory symptoms.    Sleep hx:   PSG 5/16 indicates severe DELMA with an AHI 55.6/h O2 jake 60%.  He was successfully titrated to CPAP 10 cm but had intolerance issues and pressures dropped to 6 cm with elevated AHI currently using CPAP 8 cm H2O nightly. Device obtained in 2016.     ROS: As per HPI and otherwise negative if not stated.    Past Medical History:   Diagnosis Date    Glaucoma     Hyperlipidemia        Past Surgical History:   Procedure Laterality Date    CATARACT PHACO WITH IOL  5/16/2013    Performed by Theodore Guzman M.D. at [facility]    CATARACT PHACO WITH IOL  5/2/2013    Performed by Theodore Guzman M.D. at [facility]       Family History   Problem Relation Age of Onset    Heart Attack Father     No Known Problems Mother     Cancer Sister 62        colon cancer    No Known Problems Maternal Grandmother     No Known Problems Maternal Grandfather     No Known Problems Paternal Grandmother     No Known Problems Paternal Grandfather     No Known Problems Brother     No Known Problems Brother     No Known Problems Brother     No Known Problems Brother     Sleep Apnea Neg Hx        Social History     Socioeconomic History    Marital  "status:      Spouse name: Not on file    Number of children: 2    Years of education: Not on file    Highest education level: Not on file   Occupational History    Not on file   Tobacco Use    Smoking status: Former     Packs/day: 1.50     Years: 15.00     Pack years: 22.50     Types: Cigarettes     Quit date: 1984     Years since quittin.5    Smokeless tobacco: Never    Tobacco comments:     quit    Vaping Use    Vaping Use: Never used   Substance and Sexual Activity    Alcohol use: Yes     Alcohol/week: 4.2 oz     Types: 7 Glasses of wine per week     Comment: 1 glass a night    Drug use: No    Sexual activity: Not Currently     Partners: Female   Other Topics Concern    Not on file   Social History Narrative    Not on file     Social Determinants of Health     Financial Resource Strain: Not on file   Food Insecurity: Not on file   Transportation Needs: Not on file   Physical Activity: Not on file   Stress: Not on file   Social Connections: Not on file   Intimate Partner Violence: Not on file   Housing Stability: Unknown    Unable to Pay for Housing in the Last Year: No    Number of Places Lived in the Last Year: Not on file    Unstable Housing in the Last Year: No       Allergies as of 2022 - Reviewed 2022   Allergen Reaction Noted    Other environmental Unspecified 2017        Vitals:  /60 (BP Location: Left arm, Patient Position: Sitting, BP Cuff Size: Adult)   Pulse 61   Resp 16   Ht 1.702 m (5' 7\")   Wt 75.8 kg (167 lb)   SpO2 94%     Current medications as of today   Current Outpatient Medications   Medication Sig Dispense Refill    atorvastatin (LIPITOR) 40 MG Tab TAKE ONE TABLET BY MOUTH AT BEDTIME 100 Tablet 3    dutasteride (AVODART) 0.5 MG capsule TAKE ONE CAPSULE BY MOUTH ONE TIME DAILY 100 Capsule 3    tamsulosin (FLOMAX) 0.4 MG capsule Take 1 Capsule by mouth every day. 100 Capsule 3    metFORMIN ER (GLUCOPHAGE XR) 500 MG TABLET SR 24 HR Take 1 Tablet " by mouth 2 times a day. 100 Tablet 3    famciclovir (FAMVIR) 125 MG Tab Take 1 Tablet by mouth 2 times a day. Take for 5 days if symptoms flare up. 60 Tablet 0    timolol (TIMOPTIC) 0.5 % Solution INSTILL 1 DROP INTO RIGHT EYE EVERY MORNING  6    bimatoprost (LUMIGAN) 0.01 % Solution Place 1 Drop in both eyes every bedtime. 1 DROP IN EACH EYE AT BED TIME.       No current facility-administered medications for this visit.         Physical Exam:   Gen:           Alert and oriented, No apparent distress. Mood and affect appropriate, normal interaction with examiner.  Eyes:          PERRL, EOM intact, sclere white, conjunctive moist. Glasses.  Ears:          Not examined.   Hearing:     Grossly intact.  Nose:          Normal, no lesions or deformities.  Dentition:    mask  Oropharynx:   mask  Mallampati Classification: mask  Neck:        Supple, trachea midline, no masses.  Respiratory Effort: No intercostal retractions or use of accessory muscles.   Lung Auscultation:      Clear to auscultation bilaterally; no rales, rhonchi or wheezing.  CV:            Regular rate and rhythm. No murmurs, rubs or gallops.  Abd:           Not examined.   Lymphadenopathy: Not examined.  Gait and Station: Normal.  Digits and Nails: No clubbing, cyanosis, petechiae, or nodes.   Cranial Nerves: II-XII grossly intact.  Skin:        No rashes, lesions or ulcers noted.               Ext:           No cyanosis or edema.      Assessment:  1. DELMA on CPAP  DME Mask and Supplies    DME CPAP      2. BMI 26.0-26.9,adult        3. Former smoker            Immunizations:    Flu:recommend in the fall  Pneumovax 23:2018  Prevnar 13:2017  PCV 20: not due  COVID-19: 5/21/22, 11/4/21, 2/19/21, 1/22/21    Plan:  DELMA is well controlled; continue CPAP nightly.  DME mask/supplies  DME CPAP; greater than 5 years old replace  Discussed sleep hygiene  Follow-up with primary care further health concerns  Follow-up in 3 to 4 months for first compliance check on new  device, sooner if needed.  If symptoms are stable and DELMA well treated may resume annual follow-ups.    Please note that this dictation was created using voice recognition software. I have made every reasonable attempt to correct obvious errors, but it is possible there are errors of grammar and possibly content that I did not discover before finalizing the note.

## 2022-08-16 NOTE — PATIENT INSTRUCTIONS
Call [facility] to check cost of new CPAP device; if ok with cost, call DME : [facility] Phone. [phone] Fax. [phone]  About getting new device

## 2022-11-11 DIAGNOSIS — R73.03 PREDIABETES: ICD-10-CM

## 2022-11-11 RX ORDER — METFORMIN HYDROCHLORIDE 500 MG/1
500 TABLET, EXTENDED RELEASE ORAL 2 TIMES DAILY
Qty: 200 TABLET | Refills: 3 | Status: SHIPPED | OUTPATIENT
Start: 2022-11-11 | End: 2023-01-09 | Stop reason: SDUPTHER

## 2022-12-13 ENCOUNTER — OFFICE VISIT (OUTPATIENT)
Dept: SLEEP MEDICINE | Facility: MEDICAL CENTER | Age: 72
End: 2022-12-13
Payer: MEDICARE

## 2022-12-13 VITALS
HEIGHT: 67 IN | RESPIRATION RATE: 16 BRPM | WEIGHT: 170 LBS | OXYGEN SATURATION: 95 % | SYSTOLIC BLOOD PRESSURE: 110 MMHG | BODY MASS INDEX: 26.68 KG/M2 | HEART RATE: 70 BPM | DIASTOLIC BLOOD PRESSURE: 70 MMHG

## 2022-12-13 DIAGNOSIS — Z87.891 FORMER SMOKER: ICD-10-CM

## 2022-12-13 DIAGNOSIS — G47.33 OSA ON CPAP: ICD-10-CM

## 2022-12-13 PROCEDURE — 99213 OFFICE O/P EST LOW 20 MIN: CPT | Performed by: NURSE PRACTITIONER

## 2022-12-13 ASSESSMENT — FIBROSIS 4 INDEX: FIB4 SCORE: 2.08

## 2022-12-13 NOTE — PATIENT INSTRUCTIONS
Obtain new CPAP from    DME : [facility] Phone. [phone] Fax. [phone]    Start using every night for the duration of sleep    Follow up in 3 months, bring new machine to office visit

## 2022-12-13 NOTE — PROGRESS NOTES
Chief Complaint   Patient presents with    Follow-Up    Apnea       HPI:  Jamal Dee is a 72 y.o. year old male here today for follow-up on DELMA.  Last OV 8/16/22    He was ordered a new CPAP through [facility] [place]. He is pending getting his new device from them tomorrow.     Currently using CPAP @ 9cm H20 nightly; RESMED; device obtained 5/2016. He would like to replace his device. Compliance not available.  He notes using his device every night for an average of 7-1/2 to 8 hours.  He notes dreaming and overall restful sleep.  Sometimes his dog will wake him up during the night and it may cause some tiredness or he may nap but that does not happen on a daily or weekly basis.  He generally will sleep through the night maybe wake up 1 time.  He denies nightmares or waking with headaches.  He notes being more tired recently after shoveling snow for 3 hours and did require a nap.  He denies cardiac or respiratory symptoms.  He generally has no complaints about therapy.  We reviewed what to do with his current device once he obtains a new one and to clean it up and keep as a backup he may use for travel.    Sleep hx:   PSG 5/16 indicates severe DELMA with an AHI 55.6/h O2 jake 60%.  He was successfully titrated to CPAP 10 cm but had intolerance issues and pressures dropped to 6 cm with elevated AHI currently using CPAP 8 cm H2O nightly. Device obtained in 2016.       ROS: As per HPI and otherwise negative if not stated.    Past Medical History:   Diagnosis Date    Glaucoma     Hyperlipidemia        Past Surgical History:   Procedure Laterality Date    CATARACT PHACO WITH IOL  5/16/2013    Performed by Theodore Guzman M.D. at SURGERY SURG[facility]S    CATARACT PHACO WITH IOL  5/2/2013    Performed by Theodore Guzman M.D. at SUR[facility]ERY SUR[facility] ORS       Family History   Problem Relation Age of Onset    Heart Attack Father     No Known Problems Mother     Cancer Sister 62        colon cancer    No Known Problems Maternal  "Grandmother     No Known Problems Maternal Grandfather     No Known Problems Paternal Grandmother     No Known Problems Paternal Grandfather     No Known Problems Brother     No Known Problems Brother     No Known Problems Brother     No Known Problems Brother     Sleep Apnea Neg Hx        Social History     Socioeconomic History    Marital status:      Spouse name: Not on file    Number of children: 2    Years of education: Not on file    Highest education level: Not on file   Occupational History    Not on file   Tobacco Use    Smoking status: Former     Packs/day: 1.50     Years: 15.00     Pack years: 22.50     Types: Cigarettes     Quit date: 1984     Years since quittin.9    Smokeless tobacco: Never    Tobacco comments:     quit    Vaping Use    Vaping Use: Never used   Substance and Sexual Activity    Alcohol use: Yes     Alcohol/week: 4.2 oz     Types: 7 Glasses of wine per week     Comment: 1 glass a night    Drug use: No    Sexual activity: Not Currently     Partners: Female   Other Topics Concern    Not on file   Social History Narrative    Not on file     Social Determinants of Health     Financial Resource Strain: Not on file   Food Insecurity: Not on file   Transportation Needs: Not on file   Physical Activity: Not on file   Stress: Not on file   Social Connections: Not on file   Intimate Partner Violence: Not on file   Housing Stability: Unknown    Unable to Pay for Housing in the Last Year: No    Number of Places Lived in the Last Year: Not on file    Unstable Housing in the Last Year: No       Allergies as of 2022 - Reviewed 2022   Allergen Reaction Noted    Other environmental Unspecified 2017        Vitals:  /70   Pulse 70   Resp 16   Ht 1.702 m (5' 7\")   Wt 77.1 kg (170 lb)   SpO2 95%     Current medications as of today   Current Outpatient Medications   Medication Sig Dispense Refill    metFORMIN ER (GLUCOPHAGE XR) 500 MG TABLET SR 24 HR Take 1 " Tablet by mouth 2 times a day. 200 Tablet 3    atorvastatin (LIPITOR) 40 MG Tab TAKE ONE TABLET BY MOUTH AT BEDTIME 100 Tablet 3    dutasteride (AVODART) 0.5 MG capsule TAKE ONE CAPSULE BY MOUTH ONE TIME DAILY 100 Capsule 3    tamsulosin (FLOMAX) 0.4 MG capsule Take 1 Capsule by mouth every day. 100 Capsule 3    famciclovir (FAMVIR) 125 MG Tab Take 1 Tablet by mouth 2 times a day. Take for 5 days if symptoms flare up. 60 Tablet 0    timolol (TIMOPTIC) 0.5 % Solution INSTILL 1 DROP INTO RIGHT EYE EVERY MORNING  6    bimatoprost (LUMIGAN) 0.01 % Solution Place 1 Drop in both eyes every bedtime. 1 DROP IN EACH EYE AT BED TIME.       No current facility-administered medications for this visit.         Physical Exam:   Gen:           Alert and oriented, No apparent distress. Mood and affect appropriate, normal interaction with examiner.  Eyes:          PERRL, EOM intact, sclere white, conjunctive moist. Glasses  Ears:          Not examined.   Hearing:     Grossly intact.  Nose:          Normal, no lesions or deformities.  Dentition:    mask  Oropharynx:   mask  Mallampati Classification: mask  Neck:        Supple, trachea midline, no masses.  Respiratory Effort: No intercostal retractions or use of accessory muscles.   Lung Auscultation:      Clear to auscultation bilaterally; no rales, rhonchi or wheezing.  CV:            Regular rate and rhythm. No murmurs, rubs or gallops.  Abd:           Not examined.   Lymphadenopathy: Not examined.  Gait and Station: Normal.  Digits and Nails: No clubbing, cyanosis, petechiae, or nodes.   Cranial Nerves: II-XII grossly intact.  Skin:        No rashes, lesions or ulcers noted.               Ext:           No cyanosis or edema.      Assessment:  1. DELMA on CPAP        2. BMI 26.0-26.9,adult        3. Former smoker            Immunizations:    Flu:9/19/22  Pneumovax 23:2018  Prevnar 13:2017  PCV 20: not due  COVID-19: 10/6/22    Plan:  DELMA is well treated based on symptoms.  He will  obtain his new device tomorrow.  Recommend compliance check in 3 months on new device.  Follow-up primary care further health concerns  Follow-up in 3 months for compliance check on new device, sooner if needed.  Advised patient see me a [facility] message if he has any other questions or concerns.    Please note that this dictation was created using voice recognition software. I have made every reasonable attempt to correct obvious errors, but it is possible there are errors of grammar and possibly content that I did not discover before finalizing the note.

## 2023-01-08 ENCOUNTER — PATIENT MESSAGE (OUTPATIENT)
Dept: MEDICAL GROUP | Age: 73
End: 2023-01-08
Payer: MEDICARE

## 2023-01-08 DIAGNOSIS — R73.03 PREDIABETES: ICD-10-CM

## 2023-01-09 RX ORDER — METFORMIN HYDROCHLORIDE 500 MG/1
500 TABLET, EXTENDED RELEASE ORAL 2 TIMES DAILY
Qty: 200 TABLET | Refills: 1 | Status: SHIPPED | OUTPATIENT
Start: 2023-01-09 | End: 2023-06-06 | Stop reason: SDUPTHER

## 2023-04-06 ENCOUNTER — OFFICE VISIT (OUTPATIENT)
Dept: SLEEP MEDICINE | Facility: MEDICAL CENTER | Age: 73
End: 2023-04-06
Attending: NURSE PRACTITIONER
Payer: MEDICARE

## 2023-04-06 VITALS
RESPIRATION RATE: 16 BRPM | WEIGHT: 170 LBS | SYSTOLIC BLOOD PRESSURE: 110 MMHG | HEIGHT: 67 IN | OXYGEN SATURATION: 93 % | BODY MASS INDEX: 26.68 KG/M2 | DIASTOLIC BLOOD PRESSURE: 60 MMHG | HEART RATE: 61 BPM

## 2023-04-06 DIAGNOSIS — G47.33 OSA ON CPAP: Chronic | ICD-10-CM

## 2023-04-06 DIAGNOSIS — Z87.891 FORMER SMOKER: ICD-10-CM

## 2023-04-06 PROCEDURE — 99212 OFFICE O/P EST SF 10 MIN: CPT | Performed by: NURSE PRACTITIONER

## 2023-04-06 PROCEDURE — 99213 OFFICE O/P EST LOW 20 MIN: CPT | Performed by: NURSE PRACTITIONER

## 2023-04-06 ASSESSMENT — PATIENT HEALTH QUESTIONNAIRE - PHQ9: CLINICAL INTERPRETATION OF PHQ2 SCORE: 0

## 2023-04-06 ASSESSMENT — FIBROSIS 4 INDEX: FIB4 SCORE: 2.08

## 2023-04-06 NOTE — PROGRESS NOTES
Chief Complaint   Patient presents with    Follow-Up     Apnea // last seen 12/13/2022    Other     Set up on new machine 12/14/2022       HPI:  Jamal Dee is a 72 y.o. year old male here today for follow-up on DELMA; compliance check on new device.  Last OV 12/13/22     Currently using CPAP @ 9cm H20 nightly; RESMED; device obtained 2022.  Compliance report 2/7/2023 through 4/5/2023 indicates 100% compliance, average nightly 7 hours 53 minutes, minimal to moderate mask leak with nasal pillow and overall AHI of 6.8/h.  Reviewed with patient.  JACK index 5.9, OA index 0.2.  Reviewed in detail with patient.  He likes his new device and tolerate mask and pressure well.  He did try a fullface mask but was unable to tolerate it.  He denies morning headaches.  He notes consistent daytime energy levels.  He will.  Daiana take a nap once every 2 weeks for 1 to 2 hours at the most.  He denies cardiac or respiratory symptoms.    He brought his older device to verify the settings are accurate since last time he used it he felt the pressure was slightly low.    Sleep hx:   PSG 5/16 indicates severe DELMA with an AHI 55.6/h O2 jake 60%.  He was successfully titrated to CPAP 10 cm but had intolerance issues and pressures dropped to 6 cm with elevated AHI currently using CPAP 8 cm H2O nightly. Device obtained in 2016.       ROS: As per HPI and otherwise negative if not stated.    Past Medical History:   Diagnosis Date    Glaucoma     Hyperlipidemia        Past Surgical History:   Procedure Laterality Date    CATARACT PHACO WITH IOL  5/16/2013    Performed by Theodore Guzman M.D. at SURGERY SURG[facility] ORS    CATARACT PHACO WITH IOL  5/2/2013    Performed by Theodore Guzman M.D. at SUR[facility] ORS       Family History   Problem Relation Age of Onset    Heart Attack Father     No Known Problems Mother     Cancer Sister 62        colon cancer    No Known Problems Maternal Grandmother     No Known Problems Maternal  "Grandfather     No Known Problems Paternal Grandmother     No Known Problems Paternal Grandfather     No Known Problems Brother     No Known Problems Brother     No Known Problems Brother     No Known Problems Brother     Sleep Apnea Neg Hx        Social History     Socioeconomic History    Marital status:      Spouse name: Not on file    Number of children: 2    Years of education: Not on file    Highest education level: Not on file   Occupational History    Not on file   Tobacco Use    Smoking status: Former     Packs/day: 1.50     Years: 15.00     Pack years: 22.50     Types: Cigarettes     Quit date: 1984     Years since quittin.2    Smokeless tobacco: Never    Tobacco comments:     quit    Vaping Use    Vaping Use: Never used   Substance and Sexual Activity    Alcohol use: Yes     Alcohol/week: 4.2 oz     Types: 7 Glasses of wine per week     Comment: 1 glass a night    Drug use: No    Sexual activity: Not Currently     Partners: Female   Other Topics Concern    Not on file   Social History Narrative    Not on file     Social Determinants of Health     Financial Resource Strain: Not on file   Food Insecurity: Not on file   Transportation Needs: Not on file   Physical Activity: Not on file   Stress: Not on file   Social Connections: Not on file   Intimate Partner Violence: Not on file   Housing Stability: Unknown    Unable to Pay for Housing in the Last Year: No    Number of Places Lived in the Last Year: Not on file    Unstable Housing in the Last Year: No       Allergies as of 2023 - Reviewed 2023   Allergen Reaction Noted    Other environmental Unspecified 2017        Vitals:  /60 (BP Location: Left arm, Patient Position: Sitting, BP Cuff Size: Adult)   Pulse 61   Resp 16   Ht 1.702 m (5' 7\")   Wt 77.1 kg (170 lb)   SpO2 93%     Current medications as of today   Current Outpatient Medications   Medication Sig Dispense Refill    metFORMIN ER (GLUCOPHAGE XR) 500 " MG TABLET SR 24 HR Take 1 Tablet by mouth 2 times a day. 200 Tablet 1    atorvastatin (LIPITOR) 40 MG Tab TAKE ONE TABLET BY MOUTH AT BEDTIME 100 Tablet 3    dutasteride (AVODART) 0.5 MG capsule TAKE ONE CAPSULE BY MOUTH ONE TIME DAILY 100 Capsule 3    tamsulosin (FLOMAX) 0.4 MG capsule Take 1 Capsule by mouth every day. 100 Capsule 3    famciclovir (FAMVIR) 125 MG Tab Take 1 Tablet by mouth 2 times a day. Take for 5 days if symptoms flare up. 60 Tablet 0    timolol (TIMOPTIC) 0.5 % Solution INSTILL 1 DROP INTO RIGHT EYE EVERY MORNING  6    bimatoprost (LUMIGAN) 0.01 % Solution Place 1 Drop in both eyes every bedtime. 1 DROP IN EACH EYE AT BED TIME.       No current facility-administered medications for this visit.         Physical Exam:   Gen:           Alert and oriented, No apparent distress. Mood and affect appropriate, normal interaction with examiner.  Eyes:          PERRL, EOM intact, sclere white, conjunctive moist. Glasses.  Ears:          Not examined.   Hearing:     Grossly intact.  Nose:          Normal, no lesions or deformities.  Dentition:    mask  Oropharynx:   mask  Mallampati Classification: mask  Neck:        Supple, trachea midline, no masses.  Respiratory Effort: No intercostal retractions or use of accessory muscles.   Lung Auscultation:      Clear to auscultation bilaterally; no rales, rhonchi or wheezing.  CV:            Regular rate and rhythm. No murmurs, rubs or gallops.  Abd:           Not examined.   Lymphadenopathy: Not examined.  Gait and Station: Normal.  Digits and Nails: No clubbing, cyanosis, petechiae, or nodes.   Cranial Nerves: II-XII grossly intact.  Skin:        No rashes, lesions or ulcers noted.               Ext:           No cyanosis or edema.      Assessment:  1. DELMA on CPAP  DME Mask and Supplies      2. BMI 26.0-26.9,adult        3. Former smoker            Immunizations:    Flu:9/19/22  Pneumovax 23:2018  Prevnar 13:2017  PCV 20: not due  COVID-19:  10/6/22    Plan:  DELMA is well controlled on therapy.  He will continue CPAP 9 cm.  Both devices were checked for settings in the office today.  Continue nightly use.   DME mask/supplies  Follow-up with primary care for the health concerns.  Follow-up in 1 year with compliance report, sooner if needed.    Please note that this dictation was created using voice recognition software. I have made every reasonable attempt to correct obvious errors, but it is possible there are errors of grammar and possibly content that I did not discover before finalizing the note.

## 2023-05-02 ENCOUNTER — TELEPHONE (OUTPATIENT)
Dept: HEALTH INFORMATION MANAGEMENT | Facility: OTHER | Age: 73
End: 2023-05-02
Payer: MEDICARE

## 2023-05-04 PROBLEM — I73.9 PAD (PERIPHERAL ARTERY DISEASE) (HCC): Status: ACTIVE | Noted: 2023-05-04

## 2023-05-30 ENCOUNTER — HOSPITAL ENCOUNTER (OUTPATIENT)
Dept: LAB | Facility: MEDICAL CENTER | Age: 73
End: 2023-05-30
Attending: FAMILY MEDICINE
Payer: MEDICARE

## 2023-05-30 DIAGNOSIS — E78.2 MIXED HYPERLIPIDEMIA: ICD-10-CM

## 2023-05-30 DIAGNOSIS — R73.03 PREDIABETES: ICD-10-CM

## 2023-05-30 LAB
ALBUMIN SERPL BCP-MCNC: 4.2 G/DL (ref 3.2–4.9)
ALBUMIN/GLOB SERPL: 1.8 G/DL
ALP SERPL-CCNC: 67 U/L (ref 30–99)
ALT SERPL-CCNC: 34 U/L (ref 2–50)
ANION GAP SERPL CALC-SCNC: 13 MMOL/L (ref 7–16)
AST SERPL-CCNC: 25 U/L (ref 12–45)
BASOPHILS # BLD AUTO: 1.1 % (ref 0–1.8)
BASOPHILS # BLD: 0.05 K/UL (ref 0–0.12)
BILIRUB SERPL-MCNC: 0.6 MG/DL (ref 0.1–1.5)
BUN SERPL-MCNC: 16 MG/DL (ref 8–22)
CALCIUM ALBUM COR SERPL-MCNC: 8.7 MG/DL (ref 8.5–10.5)
CALCIUM SERPL-MCNC: 8.9 MG/DL (ref 8.5–10.5)
CHLORIDE SERPL-SCNC: 109 MMOL/L (ref 96–112)
CHOLEST SERPL-MCNC: 121 MG/DL (ref 100–199)
CO2 SERPL-SCNC: 25 MMOL/L (ref 20–33)
CREAT SERPL-MCNC: 0.81 MG/DL (ref 0.5–1.4)
EOSINOPHIL # BLD AUTO: 0.24 K/UL (ref 0–0.51)
EOSINOPHIL NFR BLD: 5.3 % (ref 0–6.9)
ERYTHROCYTE [DISTWIDTH] IN BLOOD BY AUTOMATED COUNT: 48.5 FL (ref 35.9–50)
EST. AVERAGE GLUCOSE BLD GHB EST-MCNC: 123 MG/DL
FASTING STATUS PATIENT QL REPORTED: NORMAL
GFR SERPLBLD CREATININE-BSD FMLA CKD-EPI: 93 ML/MIN/1.73 M 2
GLOBULIN SER CALC-MCNC: 2.4 G/DL (ref 1.9–3.5)
GLUCOSE SERPL-MCNC: 94 MG/DL (ref 65–99)
HBA1C MFR BLD: 5.9 % (ref 4–5.6)
HCT VFR BLD AUTO: 45.8 % (ref 42–52)
HDLC SERPL-MCNC: 42 MG/DL
HGB BLD-MCNC: 14.7 G/DL (ref 14–18)
IMM GRANULOCYTES # BLD AUTO: 0.02 K/UL (ref 0–0.11)
IMM GRANULOCYTES NFR BLD AUTO: 0.4 % (ref 0–0.9)
LDLC SERPL CALC-MCNC: 53 MG/DL
LYMPHOCYTES # BLD AUTO: 1.18 K/UL (ref 1–4.8)
LYMPHOCYTES NFR BLD: 26.2 % (ref 22–41)
MCH RBC QN AUTO: 30.4 PG (ref 27–33)
MCHC RBC AUTO-ENTMCNC: 32.1 G/DL (ref 32.3–36.5)
MCV RBC AUTO: 94.8 FL (ref 81.4–97.8)
MONOCYTES # BLD AUTO: 0.36 K/UL (ref 0–0.85)
MONOCYTES NFR BLD AUTO: 8 % (ref 0–13.4)
NEUTROPHILS # BLD AUTO: 2.65 K/UL (ref 1.82–7.42)
NEUTROPHILS NFR BLD: 59 % (ref 44–72)
NRBC # BLD AUTO: 0 K/UL
NRBC BLD-RTO: 0 /100 WBC (ref 0–0.2)
PLATELET # BLD AUTO: 181 K/UL (ref 164–446)
PMV BLD AUTO: 10.5 FL (ref 9–12.9)
POTASSIUM SERPL-SCNC: 3.9 MMOL/L (ref 3.6–5.5)
PROT SERPL-MCNC: 6.6 G/DL (ref 6–8.2)
RBC # BLD AUTO: 4.83 M/UL (ref 4.7–6.1)
SODIUM SERPL-SCNC: 147 MMOL/L (ref 135–145)
TRIGL SERPL-MCNC: 130 MG/DL (ref 0–149)
WBC # BLD AUTO: 4.5 K/UL (ref 4.8–10.8)

## 2023-05-30 PROCEDURE — 85025 COMPLETE CBC W/AUTO DIFF WBC: CPT

## 2023-05-30 PROCEDURE — 36415 COLL VENOUS BLD VENIPUNCTURE: CPT

## 2023-05-30 PROCEDURE — 83036 HEMOGLOBIN GLYCOSYLATED A1C: CPT

## 2023-05-30 PROCEDURE — 80061 LIPID PANEL: CPT

## 2023-05-30 PROCEDURE — 80053 COMPREHEN METABOLIC PANEL: CPT

## 2023-06-06 ENCOUNTER — OFFICE VISIT (OUTPATIENT)
Dept: MEDICAL GROUP | Age: 73
End: 2023-06-06
Payer: MEDICARE

## 2023-06-06 VITALS
WEIGHT: 169 LBS | HEIGHT: 67 IN | OXYGEN SATURATION: 100 % | DIASTOLIC BLOOD PRESSURE: 70 MMHG | BODY MASS INDEX: 26.53 KG/M2 | HEART RATE: 73 BPM | TEMPERATURE: 97.8 F | SYSTOLIC BLOOD PRESSURE: 114 MMHG

## 2023-06-06 DIAGNOSIS — Z00.00 MEDICARE ANNUAL WELLNESS VISIT, SUBSEQUENT: Primary | ICD-10-CM

## 2023-06-06 DIAGNOSIS — R73.03 PREDIABETES: ICD-10-CM

## 2023-06-06 DIAGNOSIS — Z87.891 FORMER SMOKER: ICD-10-CM

## 2023-06-06 DIAGNOSIS — E87.0 HYPERNATREMIA: ICD-10-CM

## 2023-06-06 DIAGNOSIS — R33.8 BENIGN PROSTATIC HYPERPLASIA WITH URINARY RETENTION: ICD-10-CM

## 2023-06-06 DIAGNOSIS — Z13.6 SCREENING FOR AAA (ABDOMINAL AORTIC ANEURYSM): ICD-10-CM

## 2023-06-06 DIAGNOSIS — A60.00 RECURRENT GENITAL HERPES: ICD-10-CM

## 2023-06-06 DIAGNOSIS — G47.33 OSA ON CPAP: Chronic | ICD-10-CM

## 2023-06-06 DIAGNOSIS — J30.89 CHRONIC NONSEASONAL ALLERGIC RHINITIS DUE TO POLLEN: ICD-10-CM

## 2023-06-06 DIAGNOSIS — L30.9 HAND DERMATITIS: ICD-10-CM

## 2023-06-06 DIAGNOSIS — N40.1 BENIGN PROSTATIC HYPERPLASIA WITH URINARY RETENTION: ICD-10-CM

## 2023-06-06 DIAGNOSIS — L82.1 SEBORRHEIC KERATOSIS OF SCALP: ICD-10-CM

## 2023-06-06 DIAGNOSIS — Z12.11 COLON CANCER SCREENING: ICD-10-CM

## 2023-06-06 DIAGNOSIS — Z80.0 FHX: COLON CANCER: ICD-10-CM

## 2023-06-06 DIAGNOSIS — E78.2 MIXED HYPERLIPIDEMIA: ICD-10-CM

## 2023-06-06 PROBLEM — I73.9 PAD (PERIPHERAL ARTERY DISEASE) (HCC): Status: RESOLVED | Noted: 2023-05-04 | Resolved: 2023-06-06

## 2023-06-06 PROCEDURE — G0439 PPPS, SUBSEQ VISIT: HCPCS | Performed by: FAMILY MEDICINE

## 2023-06-06 PROCEDURE — 3074F SYST BP LT 130 MM HG: CPT | Performed by: FAMILY MEDICINE

## 2023-06-06 PROCEDURE — 99214 OFFICE O/P EST MOD 30 MIN: CPT | Mod: 25 | Performed by: FAMILY MEDICINE

## 2023-06-06 PROCEDURE — 3078F DIAST BP <80 MM HG: CPT | Performed by: FAMILY MEDICINE

## 2023-06-06 RX ORDER — CLOBETASOL PROPIONATE 0.5 MG/G
OINTMENT TOPICAL
Qty: 30 G | Refills: 1 | Status: SHIPPED
Start: 2023-06-06 | End: 2023-07-07

## 2023-06-06 RX ORDER — TAMSULOSIN HYDROCHLORIDE 0.4 MG/1
0.4 CAPSULE ORAL DAILY
Qty: 100 CAPSULE | Refills: 3 | Status: SHIPPED | OUTPATIENT
Start: 2023-06-06

## 2023-06-06 RX ORDER — CHLORAL HYDRATE 500 MG
1000 CAPSULE ORAL DAILY
COMMUNITY

## 2023-06-06 RX ORDER — METFORMIN HYDROCHLORIDE 500 MG/1
500 TABLET, EXTENDED RELEASE ORAL 2 TIMES DAILY
Qty: 200 TABLET | Refills: 1 | Status: SHIPPED | OUTPATIENT
Start: 2023-06-06

## 2023-06-06 RX ORDER — ATORVASTATIN CALCIUM 40 MG/1
TABLET, FILM COATED ORAL
Qty: 100 TABLET | Refills: 3 | Status: SHIPPED | OUTPATIENT
Start: 2023-06-06

## 2023-06-06 RX ORDER — FAMCICLOVIR 125 MG/1
125 TABLET ORAL 2 TIMES DAILY
Qty: 60 TABLET | Refills: 0 | Status: SHIPPED
Start: 2023-06-06 | End: 2023-07-07

## 2023-06-06 RX ORDER — DUTASTERIDE 0.5 MG/1
CAPSULE, LIQUID FILLED ORAL
Qty: 100 CAPSULE | Refills: 3 | Status: SHIPPED | OUTPATIENT
Start: 2023-06-06

## 2023-06-06 ASSESSMENT — PATIENT HEALTH QUESTIONNAIRE - PHQ9: CLINICAL INTERPRETATION OF PHQ2 SCORE: 0

## 2023-06-06 ASSESSMENT — ACTIVITIES OF DAILY LIVING (ADL): BATHING_REQUIRES_ASSISTANCE: 0

## 2023-06-06 ASSESSMENT — ENCOUNTER SYMPTOMS: GENERAL WELL-BEING: GOOD

## 2023-06-06 ASSESSMENT — FIBROSIS 4 INDEX: FIB4 SCORE: 1.73

## 2023-06-06 NOTE — PROGRESS NOTES
Chief Complaint   Patient presents with    Annual Exam     AWV       HPI:  Jamal is a 72 y.o. here for Medicare Annual Wellness Visit    Patient is doing well. He takes all medications as directed.   Recent labs showed hypernatremia.   He complains of a hyperpigmented lesion on the right anterior scalp. He is concerned for skin cancer.   He also complains of hand skin dryness with fissures leading to intermittent skin tenderness.     Patient Active Problem List    Diagnosis Date Noted    Former smoker 06/06/2023    FHx: colon cancer 02/18/2020    Recurrent genital herpes 03/26/2019    Chronic nonseasonal allergic rhinitis due to pollen 03/27/2018    Mixed hyperlipidemia 04/04/2017    BPH (benign prostatic hypertrophy) with urinary retention 04/04/2017    Prediabetes 04/04/2017    DELMA on CPAP 05/06/2016       Current Outpatient Medications   Medication Sig Dispense Refill    Omega-3 Fatty Acids (FISH OIL) 1000 MG Cap capsule Take 1,000 mg by mouth 3 times a day with meals.      Multiple Vitamins-Minerals (ONE DAILY MULTIVITAMIN MEN PO) Take  by mouth.      Ascorbic Acid (VITAMIN C) 1000 MG Tab Take  by mouth.      tamsulosin (FLOMAX) 0.4 MG capsule Take 1 Capsule by mouth every day. 100 Capsule 3    famciclovir (FAMVIR) 125 MG Tab Take 1 Tablet by mouth 2 times a day. Take for 5 days if symptoms flare up. 60 Tablet 0    dutasteride (AVODART) 0.5 MG capsule TAKE ONE CAPSULE BY MOUTH ONE TIME DAILY 100 Capsule 3    atorvastatin (LIPITOR) 40 MG Tab TAKE ONE TABLET BY MOUTH AT BEDTIME 100 Tablet 3    metFORMIN ER (GLUCOPHAGE XR) 500 MG TABLET SR 24 HR Take 1 Tablet by mouth 2 times a day. 200 Tablet 1    clobetasol (TEMOVATE) 0.05 % Ointment Apply to fingers twice daily until symptoms resolve. 30 g 1    vitamin D3 (CHOLECALCIFEROL) 1000 Unit (25 mcg) Tab Take 1,000 Units by mouth every day. Takes vitamin D 3 2000 U daily      timolol (TIMOPTIC) 0.5 % Solution INSTILL 1 DROP INTO RIGHT EYE EVERY MORNING  6     bimatoprost (LUMIGAN) 0.01 % Solution Place 1 Drop in both eyes every bedtime. 1 DROP IN EACH EYE AT BED TIME.       No current facility-administered medications for this visit.        Patient is taking medications as noted in medication list.  Current supplements as per medication list.     Allergies: Other environmental    Current social contact/activities: golfing and treadmill for 1 hour a day     Is patient current with immunizations? Yes.    He  reports that he quit smoking about 39 years ago. His smoking use included cigarettes. He has a 22.50 pack-year smoking history. He has never used smokeless tobacco. He reports current alcohol use of about 4.2 oz of alcohol per week. He reports that he does not use drugs.  Counseling given: Not Answered  Tobacco comments: quit 1985      ROS:    Gait: Uses no assistive device   Ostomy: No   Other tubes: No   Amputations: No   Chronic oxygen use No   Last eye exam  03/ 2022   Wears hearing aids: No   : Reports urinary leakage during the last 6 months that has somewhat interfered with their daily activities or sleep.    Screening:    Depression Screening  Little interest or pleasure in doing things?  0 - not at all  Feeling down, depressed, or hopeless? 0 - not at all    Screening for Cognitive Impairment  Three Minute Recall (Banana, Sunrise, Chair)  3/3    Draw clock face with all 12 numbers and set the hands to show 20 past 8.  Yes    If cognitive concerns identified, deferred for follow up unless specifically addressed in assessment and plan.    Fall Risk Assessment  Has the patient had two or more falls in the last year or any fall with injury in the last year?  No  If fall risk identified, deferred for follow up unless specifically addressed in assessment and plan.    Safety Assessment  Throw rugs on floor.  Yes  Handrails on all stairs.  Yes  Good lighting in all hallways.  Yes  Difficulty hearing.  Yes  Patient counseled about all safety risks that were  identified.    Functional Assessment ADLs  Are there any barriers preventing you from cooking for yourself or meeting nutritional needs?  No.    Are there any barriers preventing you from driving safely or obtaining transportation?  No.    Are there any barriers preventing you from using a telephone or calling for help?  No.    Are there any barriers preventing you from shopping?  No.    Are there any barriers preventing you from taking care of your own finances?  No.    Are there any barriers preventing you from managing your medications?  No.    Are there any barriers preventing you from showering, bathing or dressing yourself?  No.    Are you currently engaging in any exercise or physical activity?  Yes.  Treadmill for an hour  What is your perception of your health?  Good.    Advance Care Planning  Do you have an Advance Directive, Living Will, Durable Power of , or POLST? Yes    Living Will     is on file    Health Maintenance Summary            Ordered - ABDOMINAL AORTIC ANEURYSM (AAA) SCREEN (Once) Ordered on 6/6/2023      No completion history exists for this topic.              Overdue - COVID-19 Vaccine (5 - Booster for Moderna series) Overdue since 12/1/2022      10/06/2022  Imm Admin: MODERNA BIVALENT BOOSTER SARS-COV-2 VACCINE (6+)    05/21/2022  Imm Admin: MODERNA SARS-COV-2 VACCINE (12+)    11/04/2021  Imm Admin: MODERNA SARS-COV-2 VACCINE (12+)    02/19/2021  Imm Admin: MODERNA SARS-COV-2 VACCINE (12+)    01/22/2021  Imm Admin: MODERNA SARS-COV-2 VACCINE (12+)              COLORECTAL CANCER SCREENING (COLONOSCOPY - Every 5 Years) Next due on 12/18/2023 12/18/2018  REFERRAL TO GI FOR COLONOSCOPY    11/11/2013  REFERRAL TO GI FOR COLONOSCOPY              Annual Wellness Visit (Every 366 Days) Next due on 6/6/2024 06/06/2023  Visit Dx: Medicare annual wellness visit, subsequent    05/04/2023  Level of Service: ANNUAL WELLNESS VISIT-INCLUDES PPPS SUBSEQUE*    06/28/2022  Level of  Service: MT ANNUAL WELLNESS VISIT-INCLUDES PPPS SUBSEQUE*    06/28/2022  Visit Dx: Medicare annual wellness visit, subsequent    08/18/2020  Level of Service: MT ANNUAL WELLNESS VISIT-INCLUDES PPPS SUBSEQUE*    Only the first 5 history entries have been loaded, but more history exists.              IMM DTaP/Tdap/Td Vaccine (2 - Td or Tdap) Next due on 9/6/2027 09/06/2017  Imm Admin: Tdap Vaccine              HEPATITIS C SCREENING  Completed      06/01/2017  HEP C VIRUS ANTIBODY              IMM PNEUMOCOCCAL VACCINE: 65+ Years (Series Information) Completed      09/18/2018  Imm Admin: Pneumococcal polysaccharide vaccine (PPSV-23)    06/06/2017  Imm Admin: Pneumococcal Conjugate Vaccine (Prevnar/PCV-13)              IMM HEP B VACCINE (Series Information) Completed      11/12/2019  Imm Admin: Hepatitis B Vaccine (Adol/Adult)    05/30/2018  Imm Admin: Hepatitis B Vaccine (Adol/Adult)    03/27/2018  Imm Admin: Hepatitis B Vaccine (Adol/Adult)              IMM ZOSTER VACCINES (Series Information) Completed      09/28/2021  Imm Admin: Zoster Vaccine Recombinant (RZV) (SHINGRIX)    06/17/2021  Imm Admin: Zoster Vaccine Recombinant (RZV) (SHINGRIX)    01/12/2015  Imm Admin: Zoster Vaccine Live (ZVL) (Zostavax) - HISTORICAL DATA              IMM INFLUENZA (Series Information) Completed      09/19/2022  Imm Admin: Influenza, Unspecified - HISTORICAL DATA    09/14/2021  Imm Admin: Influenza, Unspecified - HISTORICAL DATA    09/22/2020  Imm Admin: Influenza, Unspecified - HISTORICAL DATA    09/22/2020  Imm Admin: Influenza Vaccine Quad Inj (Pf)    09/24/2019  Imm Admin: Influenza, Unspecified - HISTORICAL DATA    Only the first 5 history entries have been loaded, but more history exists.              HPV Vaccines (Series Information) Aged Out      No completion history exists for this topic.              IMM MENINGOCOCCAL ACWY VACCINE (Series Information) Aged Out      No completion history exists for this topic.          "           Patient Care Team:  Rosina Agosto M.D. as PCP - General (Family Medicine)  Maya Gage M.D. as PCP - [facility] Paneled  Theodore Guzman M.D. as Consulting Physician (Ophthalmology)  [facility] (Inactive) as Consulting Physician (Gastroenterology)  [facility] as Home Health Provider (DME Supplier)  [facility]ogg (Inactive)    Social History     Tobacco Use    Smoking status: Former     Packs/day: 1.50     Years: 15.00     Pack years: 22.50     Types: Cigarettes     Quit date: 1984     Years since quittin.3    Smokeless tobacco: Never    Tobacco comments:     quit    Vaping Use    Vaping Use: Never used   Substance Use Topics    Alcohol use: Yes     Alcohol/week: 4.2 oz     Types: 7 Glasses of wine per week     Comment: 1 glass a night    Drug use: No     Family History   Problem Relation Age of Onset    Heart Attack Father     No Known Problems Mother     Cancer Sister 62        colon cancer    No Known Problems Maternal Grandmother     No Known Problems Maternal Grandfather     No Known Problems Paternal Grandmother     No Known Problems Paternal Grandfather     No Known Problems Brother     No Known Problems Brother     No Known Problems Brother     No Known Problems Brother     Sleep Apnea Neg Hx      He  has a past medical history of Glaucoma and Hyperlipidemia.   Past Surgical History:   Procedure Laterality Date    CATARACT PHACO WITH IOL  2013    Performed by Theodore Guzman M.D. at SURGERY SURG[facility] [place] ORS    CATARACT PHACO WITH IOL  2013    Performed by Theodore Guzman M.D. at [facility] [place] ORS       Exam:   /70 (BP Location: Right arm, Patient Position: Sitting, BP Cuff Size: Adult)   Pulse 73   Temp 36.6 °C (97.8 °F) (Temporal)   Ht 1.702 m (5' 7\")   Wt 76.7 kg (169 lb)   SpO2 100%  Body mass index is 26.47 kg/m².    Hearing excellent.    Dentition good  Alert, oriented in no acute distress  Eye contact is good, speech goal directed, " affect calm  Excessive skin dryness noted on fingers. Shallow skin fissures also noted.   0.5 cm, hyperpigmented skin lesion with greasy, stuck-on appearance at the right anterior scalp.     Assessment and Plan. The following treatment and monitoring plan is recommended:      1. Prediabetes  Recent A1C was 5.9. he is on Metformin 500 mg BID, no side effects reported.   - metFORMIN ER (GLUCOPHAGE XR) 500 MG TABLET SR 24 HR; Take 1 Tablet by mouth 2 times a day.  Dispense: 200 Tablet; Refill: 1  - HEMOGLOBIN A1C; Future    2. BPH (benign prostatic hypertrophy) with urinary retention  Chronic, controlled with Flomax and Dutasteride, no s/e reported, will continue.    - tamsulosin (FLOMAX) 0.4 MG capsule; Take 1 Capsule by mouth every day.  Dispense: 100 Capsule; Refill: 3  - dutasteride (AVODART) 0.5 MG capsule; TAKE ONE CAPSULE BY MOUTH ONE TIME DAILY  Dispense: 100 Capsule; Refill: 3    3. Recurrent genital herpes  - famciclovir (FAMVIR) 125 MG Tab; Take 1 Tablet by mouth 2 times a day. Take for 5 days if symptoms flare up.  Dispense: 60 Tablet; Refill: 0    4. Mixed hyperlipidemia  Chronic, controlled with Lipitor 40 mg qd, no s/e reported, will continue.    - atorvastatin (LIPITOR) 40 MG Tab; TAKE ONE TABLET BY MOUTH AT BEDTIME  Dispense: 100 Tablet; Refill: 3  - Comp Metabolic Panel; Future  - CBC WITH DIFFERENTIAL; Future  - Lipid Profile; Future    5. DELMA on CPAP  - continue CPAP and follow up with sleep medicine as directed.     6. Chronic nonseasonal allergic rhinitis due to pollen  Chronic, controlled with over-the-counter anti-histamine, no s/e reported, will continue.      7. FHx: colon cancer  8. Colon cancer screening  - Referral to Gastroenterology    9. Former smoker  10. Screening for AAA (abdominal aortic aneurysm)  - US-ABDOMINAL AORTA W/O DOPPLER; Future    11. Medicare annual wellness visit, subsequent  Labs per orders  Immunization per orders   Patient was counseled about skin care, diet,  supplements, exercises.   Preventive cares reviewed, discussed, and updated as appropriate.       12. Hypernatremia  Recent labs showed mild hypernatremia.   No recent changes in medication, diet, or hydration status.   Patient is asymptomatic, will repeat BMP in a few weeks for reassessment.   - Basic Metabolic Panel; Future    13. Seborrheic keratosis of scalp  History and exam are concerning for seborrheic keratosis.   Pathogenesis of this condition discussed.   Since he is asymptomatic, recommended observation and follow up for any changes.     14. Hand dermatitis  History and exam are concerning for hand dermatitis.   - clobetasol (TEMOVATE) 0.05 % Ointment; Apply to fingers twice daily until symptoms resolve.  Dispense: 30 g; Refill: 1      Services suggested: No services needed at this time  Health Care Screening recommendations as per orders if indicated.  Referrals offered: PT/OT/Nutrition counseling/Behavioral Health/Smoking cessation as per orders if indicated.    Discussion today about general wellness and lifestyle habits:    Prevent falls and reduce trip hazards; Cautioned about securing or removing rugs.  Have a working fire alarm and carbon monoxide detector;   Engage in regular physical activity and social activities.     Follow-up: Return in about 1 year (around 6/6/2024) for Annual wellness visit.

## 2023-06-06 NOTE — PATIENT INSTRUCTIONS
Recent Results (from the past 672 hour(s))   CBC WITH DIFFERENTIAL    Collection Time: 05/30/23  7:53 AM   Result Value Ref Range    WBC 4.5 (L) 4.8 - 10.8 K/uL    RBC 4.83 4.70 - 6.10 M/uL    Hemoglobin 14.7 14.0 - 18.0 g/dL    Hematocrit 45.8 42.0 - 52.0 %    MCV 94.8 81.4 - 97.8 fL    MCH 30.4 27.0 - 33.0 pg    MCHC 32.1 (L) 32.3 - 36.5 g/dL    RDW 48.5 35.9 - 50.0 fL    Platelet Count 181 164 - 446 K/uL    MPV 10.5 9.0 - 12.9 fL    Neutrophils-Polys 59.00 44.00 - 72.00 %    Lymphocytes 26.20 22.00 - 41.00 %    Monocytes 8.00 0.00 - 13.40 %    Eosinophils 5.30 0.00 - 6.90 %    Basophils 1.10 0.00 - 1.80 %    Immature Granulocytes 0.40 0.00 - 0.90 %    Nucleated RBC 0.00 0.00 - 0.20 /100 WBC    Neutrophils (Absolute) 2.65 1.82 - 7.42 K/uL    Lymphs (Absolute) 1.18 1.00 - 4.80 K/uL    Monos (Absolute) 0.36 0.00 - 0.85 K/uL    Eos (Absolute) 0.24 0.00 - 0.51 K/uL    Baso (Absolute) 0.05 0.00 - 0.12 K/uL    Immature Granulocytes (abs) 0.02 0.00 - 0.11 K/uL    NRBC (Absolute) 0.00 K/uL   Comp Metabolic Panel    Collection Time: 05/30/23  7:53 AM   Result Value Ref Range    Sodium 147 (H) 135 - 145 mmol/L    Potassium 3.9 3.6 - 5.5 mmol/L    Chloride 109 96 - 112 mmol/L    Co2 25 20 - 33 mmol/L    Anion Gap 13.0 7.0 - 16.0    Glucose 94 65 - 99 mg/dL    Bun 16 8 - 22 mg/dL    Creatinine 0.81 0.50 - 1.40 mg/dL    Calcium 8.9 8.5 - 10.5 mg/dL    AST(SGOT) 25 12 - 45 U/L    ALT(SGPT) 34 2 - 50 U/L    Alkaline Phosphatase 67 30 - 99 U/L    Total Bilirubin 0.6 0.1 - 1.5 mg/dL    Albumin 4.2 3.2 - 4.9 g/dL    Total Protein 6.6 6.0 - 8.2 g/dL    Globulin 2.4 1.9 - 3.5 g/dL    A-G Ratio 1.8 g/dL   HEMOGLOBIN A1C    Collection Time: 05/30/23  7:53 AM   Result Value Ref Range    Glycohemoglobin 5.9 (H) 4.0 - 5.6 %    Est Avg Glucose 123 mg/dL   Lipid Profile    Collection Time: 05/30/23  7:53 AM   Result Value Ref Range    Cholesterol,Tot 121 100 - 199 mg/dL    Triglycerides 130 0 - 149 mg/dL    HDL 42 >=40 mg/dL    LDL 53 <100  mg/dL   FASTING STATUS    Collection Time: 05/30/23  7:53 AM   Result Value Ref Range    Fasting Status Fasting    ESTIMATED GFR    Collection Time: 05/30/23  7:53 AM   Result Value Ref Range    GFR (CKD-EPI) 93 >60 mL/min/1.73 m 2   CORRECTED CALCIUM    Collection Time: 05/30/23  7:53 AM   Result Value Ref Range    Correct Calcium 8.7 8.5 - 10.5 mg/dL

## 2023-07-05 ENCOUNTER — HOSPITAL ENCOUNTER (OUTPATIENT)
Dept: LAB | Facility: MEDICAL CENTER | Age: 73
End: 2023-07-05
Attending: FAMILY MEDICINE
Payer: MEDICARE

## 2023-07-05 DIAGNOSIS — E87.0 HYPERNATREMIA: ICD-10-CM

## 2023-07-05 DIAGNOSIS — E78.2 MIXED HYPERLIPIDEMIA: ICD-10-CM

## 2023-07-05 DIAGNOSIS — R73.03 PREDIABETES: ICD-10-CM

## 2023-07-05 LAB
ANION GAP SERPL CALC-SCNC: 13 MMOL/L (ref 7–16)
BASOPHILS # BLD AUTO: 0.7 % (ref 0–1.8)
BASOPHILS # BLD: 0.03 K/UL (ref 0–0.12)
BUN SERPL-MCNC: 14 MG/DL (ref 8–22)
CALCIUM SERPL-MCNC: 9 MG/DL (ref 8.5–10.5)
CHLORIDE SERPL-SCNC: 115 MMOL/L (ref 96–112)
CO2 SERPL-SCNC: 24 MMOL/L (ref 20–33)
CREAT SERPL-MCNC: 0.77 MG/DL (ref 0.5–1.4)
EOSINOPHIL # BLD AUTO: 0.24 K/UL (ref 0–0.51)
EOSINOPHIL NFR BLD: 5.6 % (ref 0–6.9)
ERYTHROCYTE [DISTWIDTH] IN BLOOD BY AUTOMATED COUNT: 48.3 FL (ref 35.9–50)
EST. AVERAGE GLUCOSE BLD GHB EST-MCNC: 114 MG/DL
GFR SERPLBLD CREATININE-BSD FMLA CKD-EPI: 94 ML/MIN/1.73 M 2
GLUCOSE SERPL-MCNC: 112 MG/DL (ref 65–99)
HBA1C MFR BLD: 5.6 % (ref 4–5.6)
HCT VFR BLD AUTO: 45.4 % (ref 42–52)
HGB BLD-MCNC: 14.8 G/DL (ref 14–18)
IMM GRANULOCYTES # BLD AUTO: 0.01 K/UL (ref 0–0.11)
IMM GRANULOCYTES NFR BLD AUTO: 0.2 % (ref 0–0.9)
LYMPHOCYTES # BLD AUTO: 1.27 K/UL (ref 1–4.8)
LYMPHOCYTES NFR BLD: 29.5 % (ref 22–41)
MCH RBC QN AUTO: 30.5 PG (ref 27–33)
MCHC RBC AUTO-ENTMCNC: 32.6 G/DL (ref 32.3–36.5)
MCV RBC AUTO: 93.4 FL (ref 81.4–97.8)
MONOCYTES # BLD AUTO: 0.34 K/UL (ref 0–0.85)
MONOCYTES NFR BLD AUTO: 7.9 % (ref 0–13.4)
NEUTROPHILS # BLD AUTO: 2.42 K/UL (ref 1.82–7.42)
NEUTROPHILS NFR BLD: 56.1 % (ref 44–72)
NRBC # BLD AUTO: 0 K/UL
NRBC BLD-RTO: 0 /100 WBC (ref 0–0.2)
PLATELET # BLD AUTO: 182 K/UL (ref 164–446)
PMV BLD AUTO: 10.9 FL (ref 9–12.9)
POTASSIUM SERPL-SCNC: 4.3 MMOL/L (ref 3.6–5.5)
RBC # BLD AUTO: 4.86 M/UL (ref 4.7–6.1)
SODIUM SERPL-SCNC: 152 MMOL/L (ref 135–145)
WBC # BLD AUTO: 4.3 K/UL (ref 4.8–10.8)

## 2023-07-05 PROCEDURE — 85025 COMPLETE CBC W/AUTO DIFF WBC: CPT

## 2023-07-05 PROCEDURE — 83036 HEMOGLOBIN GLYCOSYLATED A1C: CPT

## 2023-07-05 PROCEDURE — 80048 BASIC METABOLIC PNL TOTAL CA: CPT

## 2023-07-05 PROCEDURE — 36415 COLL VENOUS BLD VENIPUNCTURE: CPT

## 2023-07-07 ENCOUNTER — HOSPITAL ENCOUNTER (EMERGENCY)
Facility: MEDICAL CENTER | Age: 73
End: 2023-07-07
Attending: EMERGENCY MEDICINE
Payer: MEDICARE

## 2023-07-07 ENCOUNTER — OFFICE VISIT (OUTPATIENT)
Dept: MEDICAL GROUP | Age: 73
End: 2023-07-07
Payer: MEDICARE

## 2023-07-07 ENCOUNTER — HOSPITAL ENCOUNTER (OUTPATIENT)
Dept: LAB | Facility: MEDICAL CENTER | Age: 73
End: 2023-07-07
Attending: PHYSICIAN ASSISTANT
Payer: MEDICARE

## 2023-07-07 VITALS
RESPIRATION RATE: 16 BRPM | DIASTOLIC BLOOD PRESSURE: 73 MMHG | TEMPERATURE: 97.8 F | OXYGEN SATURATION: 96 % | WEIGHT: 173.5 LBS | HEIGHT: 67 IN | HEART RATE: 65 BPM | SYSTOLIC BLOOD PRESSURE: 118 MMHG | BODY MASS INDEX: 27.23 KG/M2

## 2023-07-07 VITALS
DIASTOLIC BLOOD PRESSURE: 58 MMHG | OXYGEN SATURATION: 95 % | HEART RATE: 52 BPM | SYSTOLIC BLOOD PRESSURE: 100 MMHG | TEMPERATURE: 98.2 F | WEIGHT: 172 LBS | HEIGHT: 67 IN | BODY MASS INDEX: 27 KG/M2

## 2023-07-07 DIAGNOSIS — E87.0 HYPERNATREMIA: ICD-10-CM

## 2023-07-07 LAB
ALBUMIN SERPL BCP-MCNC: 4 G/DL (ref 3.2–4.9)
ALBUMIN/GLOB SERPL: 1.5 G/DL
ALP SERPL-CCNC: 62 U/L (ref 30–99)
ALT SERPL-CCNC: 31 U/L (ref 2–50)
ANION GAP SERPL CALC-SCNC: 11 MMOL/L (ref 7–16)
AST SERPL-CCNC: 26 U/L (ref 12–45)
BASOPHILS # BLD AUTO: 0.6 % (ref 0–1.8)
BASOPHILS # BLD: 0.03 K/UL (ref 0–0.12)
BILIRUB SERPL-MCNC: 0.5 MG/DL (ref 0.1–1.5)
BUN SERPL-MCNC: 12 MG/DL (ref 8–22)
CALCIUM ALBUM COR SERPL-MCNC: 9.1 MG/DL (ref 8.5–10.5)
CALCIUM SERPL-MCNC: 9.1 MG/DL (ref 8.4–10.2)
CHLORIDE SERPL-SCNC: 105 MMOL/L (ref 96–112)
CO2 SERPL-SCNC: 25 MMOL/L (ref 20–33)
CREAT SERPL-MCNC: 0.77 MG/DL (ref 0.5–1.4)
EOSINOPHIL # BLD AUTO: 0.23 K/UL (ref 0–0.51)
EOSINOPHIL NFR BLD: 4.4 % (ref 0–6.9)
ERYTHROCYTE [DISTWIDTH] IN BLOOD BY AUTOMATED COUNT: 48.1 FL (ref 35.9–50)
GFR SERPLBLD CREATININE-BSD FMLA CKD-EPI: 94 ML/MIN/1.73 M 2
GLOBULIN SER CALC-MCNC: 2.6 G/DL (ref 1.9–3.5)
GLUCOSE SERPL-MCNC: 93 MG/DL (ref 65–99)
HCT VFR BLD AUTO: 46.6 % (ref 42–52)
HGB BLD-MCNC: 14.9 G/DL (ref 14–18)
IMM GRANULOCYTES # BLD AUTO: 0.02 K/UL (ref 0–0.11)
IMM GRANULOCYTES NFR BLD AUTO: 0.4 % (ref 0–0.9)
LYMPHOCYTES # BLD AUTO: 1.02 K/UL (ref 1–4.8)
LYMPHOCYTES NFR BLD: 19.3 % (ref 22–41)
MCH RBC QN AUTO: 30.4 PG (ref 27–33)
MCHC RBC AUTO-ENTMCNC: 32 G/DL (ref 32.3–36.5)
MCV RBC AUTO: 95.1 FL (ref 81.4–97.8)
MONOCYTES # BLD AUTO: 0.36 K/UL (ref 0–0.85)
MONOCYTES NFR BLD AUTO: 6.8 % (ref 0–13.4)
NEUTROPHILS # BLD AUTO: 3.62 K/UL (ref 1.82–7.42)
NEUTROPHILS NFR BLD: 68.5 % (ref 44–72)
NRBC # BLD AUTO: 0 K/UL
NRBC BLD-RTO: 0 /100 WBC (ref 0–0.2)
PLATELET # BLD AUTO: 171 K/UL (ref 164–446)
PMV BLD AUTO: 10 FL (ref 9–12.9)
POTASSIUM SERPL-SCNC: 4.2 MMOL/L (ref 3.6–5.5)
PROT SERPL-MCNC: 6.6 G/DL (ref 6–8.2)
RBC # BLD AUTO: 4.9 M/UL (ref 4.7–6.1)
SODIUM SERPL-SCNC: 141 MMOL/L (ref 135–145)
WBC # BLD AUTO: 5.3 K/UL (ref 4.8–10.8)

## 2023-07-07 PROCEDURE — 99215 OFFICE O/P EST HI 40 MIN: CPT | Performed by: PHYSICIAN ASSISTANT

## 2023-07-07 PROCEDURE — 85025 COMPLETE CBC W/AUTO DIFF WBC: CPT

## 2023-07-07 PROCEDURE — 83935 ASSAY OF URINE OSMOLALITY: CPT

## 2023-07-07 PROCEDURE — 36415 COLL VENOUS BLD VENIPUNCTURE: CPT

## 2023-07-07 PROCEDURE — 3074F SYST BP LT 130 MM HG: CPT | Performed by: PHYSICIAN ASSISTANT

## 2023-07-07 PROCEDURE — 99284 EMERGENCY DEPT VISIT MOD MDM: CPT

## 2023-07-07 PROCEDURE — 80053 COMPREHEN METABOLIC PANEL: CPT

## 2023-07-07 PROCEDURE — 3078F DIAST BP <80 MM HG: CPT | Performed by: PHYSICIAN ASSISTANT

## 2023-07-07 PROCEDURE — 84588 ASSAY OF VASOPRESSIN: CPT

## 2023-07-07 RX ORDER — CLOBETASOL PROPIONATE 0.5 MG/G
1 OINTMENT TOPICAL 2 TIMES DAILY PRN
Status: SHIPPED | COMMUNITY
End: 2023-08-07

## 2023-07-07 RX ORDER — LORATADINE 10 MG/1
10 TABLET ORAL DAILY
Status: SHIPPED | COMMUNITY
End: 2024-02-27

## 2023-07-07 RX ORDER — NAPROXEN SODIUM 220 MG
220 TABLET ORAL DAILY
COMMUNITY

## 2023-07-07 ASSESSMENT — FIBROSIS 4 INDEX
FIB4 SCORE: 1.72
FIB4 SCORE: 1.72

## 2023-07-07 NOTE — ED PROVIDER NOTES
"ED Provider Note    CHIEF COMPLAINT  Chief Complaint   Patient presents with    Abnormal Labs     73 yo male told to come in by his primary for elevated sodium levels.  Patient denies any nausea, vomiting or dizziness.  Patient reports he was at his primary's for a yearly exam in which lab work was ordered.         EXTERNAL RECORDS REVIEWED  Outpatient labs & studies Labs from 2023 noted.  Sodium 152, chloride 115, glucose 112.  Chemistry panel from 2023 with sodium 147    HPI/ROS  LIMITATION TO HISTORY   Select: : None  OUTSIDE HISTORIAN(S):  none    Jamal Dee is a 72 y.o. male with history of glaucoma and dyslipidemia who presents for abnormal lab results.    Patient states his sodium was high.  He denies nausea, vomiting, fever, chills, headache, chest pain, shortness of breath, renal issues, diuretic use.    He reports \"bubbly urine\" recently.    PAST MEDICAL HISTORY   has a past medical history of Glaucoma and Hyperlipidemia.    SURGICAL HISTORY   has a past surgical history that includes cataract phaco with iol (2013) and cataract phaco with iol (2013).    FAMILY HISTORY  Family History   Problem Relation Age of Onset    Heart Attack Father     No Known Problems Mother     Cancer Sister 62        colon cancer    No Known Problems Maternal Grandmother     No Known Problems Maternal Grandfather     No Known Problems Paternal Grandmother     No Known Problems Paternal Grandfather     No Known Problems Brother     No Known Problems Brother     No Known Problems Brother     No Known Problems Brother     Sleep Apnea Neg Hx        SOCIAL HISTORY  Social History     Tobacco Use    Smoking status: Former     Packs/day: 1.50     Years: 15.00     Pack years: 22.50     Types: Cigarettes     Quit date: 1984     Years since quittin.4    Smokeless tobacco: Never    Tobacco comments:     quit    Vaping Use    Vaping Use: Never used   Substance and Sexual Activity    Alcohol use: Yes " "    Alcohol/week: 4.2 oz     Types: 7 Glasses of wine per week     Comment: 1 glass a night    Drug use: No    Sexual activity: Not Currently     Partners: Female       CURRENT MEDICATIONS  Home Medications       Reviewed by Sadia Moscoso PhT (Pharmacy Tech) on 07/07/23 at 1512  Med List Status: Complete     Medication Last Dose Status   Ascorbic Acid (VITAMIN C PO) 7/7/2023 Active   atorvastatin (LIPITOR) 40 MG Tab 7/6/2023 Active   bimatoprost (LUMIGAN) 0.01 % Solution 7/6/2023 Active   clobetasol (TEMOVATE) 0.05 % Ointment FEW DAYS AGO Active   dutasteride (AVODART) 0.5 MG capsule 7/6/2023 Active   loratadine (CLARITIN) 10 MG Tab 7/7/2023 Active   metFORMIN ER (GLUCOPHAGE XR) 500 MG TABLET SR 24 HR 7/7/2023 Active   Multiple Vitamins-Minerals (ONE DAILY MULTIVITAMIN MEN PO) 7/7/2023 Active   naproxen (ALEVE) 220 MG tablet 7/7/2023 Active   Omega-3 Fatty Acids (FISH OIL) 1000 MG Cap capsule 7/7/2023 Active   tamsulosin (FLOMAX) 0.4 MG capsule 7/7/2023 Active   timolol (TIMOPTIC) 0.5 % Solution 7/7/2023 Active   VITAMIN D PO 7/7/2023 Active                    ALLERGIES  Allergies   Allergen Reactions    Other Environmental Unspecified     Goose down       PHYSICAL EXAM  VITAL SIGNS: /70   Pulse (!) 52   Temp 36.4 °C (97.5 °F) (Temporal)   Resp 16   Ht 1.702 m (5' 7\")   Wt 78.7 kg (173 lb 8 oz)   SpO2 93%   BMI 27.17 kg/m²    General:  WDWN male, nontoxic appearing in NAD; A+Ox3; V/S as above  Skin: warm and dry; good color; no rash  HEENT: NCAT; EOMs intact; PERRL; no scleral icterus   Neck: FROM  Cardiovascular: Bradycardic heart rate and regular rhythm.  No murmurs, rubs, or gallops; pulses 2+ bilaterally radially and DP areas  Lungs: No respiratory distress or tachypnea; Clear to auscultation with good air movement bilaterally.  No wheezes, rhonchi, or rales.   Abdomen: BS present; soft; NTND; no rebound, guarding, or rigidity.  No organomegaly or pulsatile mass  Extremities: LOBATO x 4; no e/o " trauma; no pedal edema  Neurologic: CNs III-XII grossly intact; speech clear; distal sensation intact; strength 5/5 UE/LEs  Psychiatric: Appropriate affect, normal mood      DIAGNOSTIC STUDIES / PROCEDURES  LABS  Results for orders placed or performed during the hospital encounter of 07/07/23   CBC WITH DIFFERENTIAL   Result Value Ref Range    WBC 5.3 4.8 - 10.8 K/uL    RBC 4.90 4.70 - 6.10 M/uL    Hemoglobin 14.9 14.0 - 18.0 g/dL    Hematocrit 46.6 42.0 - 52.0 %    MCV 95.1 81.4 - 97.8 fL    MCH 30.4 27.0 - 33.0 pg    MCHC 32.0 (L) 32.3 - 36.5 g/dL    RDW 48.1 35.9 - 50.0 fL    Platelet Count 171 164 - 446 K/uL    MPV 10.0 9.0 - 12.9 fL    Neutrophils-Polys 68.50 44.00 - 72.00 %    Lymphocytes 19.30 (L) 22.00 - 41.00 %    Monocytes 6.80 0.00 - 13.40 %    Eosinophils 4.40 0.00 - 6.90 %    Basophils 0.60 0.00 - 1.80 %    Immature Granulocytes 0.40 0.00 - 0.90 %    Nucleated RBC 0.00 0.00 - 0.20 /100 WBC    Neutrophils (Absolute) 3.62 1.82 - 7.42 K/uL    Lymphs (Absolute) 1.02 1.00 - 4.80 K/uL    Monos (Absolute) 0.36 0.00 - 0.85 K/uL    Eos (Absolute) 0.23 0.00 - 0.51 K/uL    Baso (Absolute) 0.03 0.00 - 0.12 K/uL    Immature Granulocytes (abs) 0.02 0.00 - 0.11 K/uL    NRBC (Absolute) 0.00 K/uL   CMP   Result Value Ref Range    Sodium 141 135 - 145 mmol/L    Potassium 4.2 3.6 - 5.5 mmol/L    Chloride 105 96 - 112 mmol/L    Co2 25 20 - 33 mmol/L    Anion Gap 11.0 7.0 - 16.0    Glucose 93 65 - 99 mg/dL    Bun 12 8 - 22 mg/dL    Creatinine 0.77 0.50 - 1.40 mg/dL    Calcium 9.1 8.4 - 10.2 mg/dL    AST(SGOT) 26 12 - 45 U/L    ALT(SGPT) 31 2 - 50 U/L    Alkaline Phosphatase 62 30 - 99 U/L    Total Bilirubin 0.5 0.1 - 1.5 mg/dL    Albumin 4.0 3.2 - 4.9 g/dL    Total Protein 6.6 6.0 - 8.2 g/dL    Globulin 2.6 1.9 - 3.5 g/dL    A-G Ratio 1.5 g/dL   CORRECTED CALCIUM   Result Value Ref Range    Correct Calcium 9.1 8.5 - 10.5 mg/dL   ESTIMATED GFR   Result Value Ref Range    GFR (CKD-EPI) 94 >60 mL/min/1.73 m 2          RADIOLOGY  None    COURSE & MEDICAL DECISION MAKING    ED Observation Status? No; Patient does not meet criteria for ED Observation.     INITIAL ASSESSMENT, COURSE AND PLAN  Care Narrative: This is a 72-year-old male who presents for abnormal outpatient labs which demonstrated hyponatremia on 7/5/2023.  We repeated the labs today and he has a normal sodium.  Patient had no physical complaints.  Patient be discharged with outpatient follow-up.  Patient is amenable to this plan.    I reviewed the patient's medication list.  I do not find any obvious culprits regarding hypernatremia.      FINAL DIAGNOSIS  1. Hypernatremia           Electronically signed by: Elidia Brown M.D., 7/7/2023 3:24 PM

## 2023-07-07 NOTE — DISCHARGE INSTRUCTIONS
Follow-up with your primary care doctor on Monday regarding your sodium levels.    Return to the ER for headache, vomiting, or other concerns

## 2023-07-07 NOTE — ED TRIAGE NOTES
"Chief Complaint   Patient presents with    Abnormal Labs     71 yo male told to come in by his primary for elevated sodium levels.  Patient denies any nausea, vomiting or dizziness.  Patient reports he was at his primary's for a yearly exam in which lab work was ordered.      /70   Pulse (!) 52   Temp 36.4 °C (97.5 °F) (Temporal)   Resp 16   Ht 1.702 m (5' 7\")   Wt 78.7 kg (173 lb 8 oz)   SpO2 93%   BMI 27.17 kg/m²    "

## 2023-07-07 NOTE — PROGRESS NOTES
cc: Hyponatremia    Subjective:     HPI  PCP Dr. Agosto, unable to see today  Jamal Dee is a 72 y.o. male presenting for lab review.  His sodium level was slightly elevated about 4 weeks ago.  PCP was monitoring, on repeat labs hypernatremia has worsened.  He does note that he has been urinating a little bit more frequently, his urine is bubbly in the morning.  He drinks about 4-5 bottled aponte a day.  His diet is well-rounded, cooks most of his food, no excessive salty use.  Has not had any change in medications.  Denies lethargy, weakness, irritability, muscle twitching, seizures, confusion, diarrhea, vomiting, excessive thirst.      Review of systems:  See above.    Latest Reference Range & Units 05/30/23 07:53 07/05/23 15:19   WBC 4.8 - 10.8 K/uL 4.5 (L) 4.3 (L)   RBC 4.70 - 6.10 M/uL 4.83 4.86   Hemoglobin 14.0 - 18.0 g/dL 14.7 14.8   Hematocrit 42.0 - 52.0 % 45.8 45.4   MCV 81.4 - 97.8 fL 94.8 93.4   MCH 27.0 - 33.0 pg 30.4 30.5   MCHC 32.3 - 36.5 g/dL 32.1 (L) 32.6   RDW 35.9 - 50.0 fL 48.5 48.3   Platelet Count 164 - 446 K/uL 181 182   MPV 9.0 - 12.9 fL 10.5 10.9   Neutrophils-Polys 44.00 - 72.00 % 59.00 56.10   Neutrophils (Absolute) 1.82 - 7.42 K/uL 2.65 2.42   Lymphocytes 22.00 - 41.00 % 26.20 29.50   Lymphs (Absolute) 1.00 - 4.80 K/uL 1.18 1.27   Monocytes 0.00 - 13.40 % 8.00 7.90   Monos (Absolute) 0.00 - 0.85 K/uL 0.36 0.34   Eosinophils 0.00 - 6.90 % 5.30 5.60   Eos (Absolute) 0.00 - 0.51 K/uL 0.24 0.24   Basophils 0.00 - 1.80 % 1.10 0.70   Baso (Absolute) 0.00 - 0.12 K/uL 0.05 0.03   Immature Granulocytes 0.00 - 0.90 % 0.40 0.20   Immature Granulocytes (abs) 0.00 - 0.11 K/uL 0.02 0.01   Nucleated RBC 0.00 - 0.20 /100 WBC 0.00 0.00   NRBC (Absolute) K/uL 0.00 0.00   Sodium 135 - 145 mmol/L 147 (H) 152 (H)   Potassium 3.6 - 5.5 mmol/L 3.9 4.3   Chloride 96 - 112 mmol/L 109 115 (H)   Co2 20 - 33 mmol/L 25 24   Anion Gap 7.0 - 16.0  13.0 13.0   Glucose 65 - 99 mg/dL 94 112 (H)   Bun 8 - 22 mg/dL  "16 14   Creatinine 0.50 - 1.40 mg/dL 0.81 0.77   GFR (CKD-EPI) >60 mL/min/1.73 m 2 93 94   Calcium 8.5 - 10.5 mg/dL 8.9 9.0   (L): Data is abnormally low  (H): Data is abnormally high    Current Outpatient Medications:     Omega-3 Fatty Acids (FISH OIL) 1000 MG Cap capsule, Take 1,000 mg by mouth 3 times a day with meals., Disp: , Rfl:     Multiple Vitamins-Minerals (ONE DAILY MULTIVITAMIN MEN PO), Take  by mouth., Disp: , Rfl:     Ascorbic Acid (VITAMIN C) 1000 MG Tab, Take  by mouth., Disp: , Rfl:     tamsulosin (FLOMAX) 0.4 MG capsule, Take 1 Capsule by mouth every day., Disp: 100 Capsule, Rfl: 3    famciclovir (FAMVIR) 125 MG Tab, Take 1 Tablet by mouth 2 times a day. Take for 5 days if symptoms flare up., Disp: 60 Tablet, Rfl: 0    dutasteride (AVODART) 0.5 MG capsule, TAKE ONE CAPSULE BY MOUTH ONE TIME DAILY, Disp: 100 Capsule, Rfl: 3    atorvastatin (LIPITOR) 40 MG Tab, TAKE ONE TABLET BY MOUTH AT BEDTIME, Disp: 100 Tablet, Rfl: 3    metFORMIN ER (GLUCOPHAGE XR) 500 MG TABLET SR 24 HR, Take 1 Tablet by mouth 2 times a day., Disp: 200 Tablet, Rfl: 1    clobetasol (TEMOVATE) 0.05 % Ointment, Apply to fingers twice daily until symptoms resolve., Disp: 30 g, Rfl: 1    vitamin D3 (CHOLECALCIFEROL) 1000 Unit (25 mcg) Tab, Take 1,000 Units by mouth every day. Takes vitamin D 3 2000 U daily, Disp: , Rfl:     timolol (TIMOPTIC) 0.5 % Solution, INSTILL 1 DROP INTO RIGHT EYE EVERY MORNING, Disp: , Rfl: 6    bimatoprost (LUMIGAN) 0.01 % Solution, Place 1 Drop in both eyes every bedtime. 1 DROP IN EACH EYE AT BED TIME., Disp: , Rfl:     Allergies, past medical history, past surgical history, family history, social history reviewed and updated    Objective:     Vitals: /58 (BP Location: Left arm, Patient Position: Sitting, BP Cuff Size: Adult)   Pulse (!) 52   Temp 36.8 °C (98.2 °F) (Temporal)   Ht 1.702 m (5' 7\")   Wt 78 kg (172 lb)   SpO2 95%   BMI 26.94 kg/m²   General: Alert, pleasant, NAD, A&O x3  HEENT: " Normocephalic. Neck supple.  No thyromegaly or masses palpated. No cervical or supraclavicular lymphadenopathy. No carotid bruits   Heart: Regular rate and rhythm.  S1 and S2 normal.  No murmurs appreciated.  Respiratory: Normal respiratory effort.  Clear to auscultation bilaterally.  Skin: Warm, dry, no rashes.  Decreased skin turgor  Extremities: No leg edema.  Radial pulses 2+ symmetric  Psych:  Affect/mood is normal, judgement is good, memory is intact, grooming is appropriate.    Assessment/Plan:     Jamal was seen today for follow-up.    Diagnoses and all orders for this visit:    Hypernatremia  -Currently seems to be asymptomatic, discussed with patient that as his sodium is worsening, no specific cause identified through discussion. He is showing some signs of dehydration with decrease skin turgor.   He is hydrating adequately, and sodium level still continue to increase feel that he needs to be evaluated more emergently in the ER, with fluid resuscitation if deemed appropriate on repeat CMP.  Once he is out of the ER, advised to have ADH and urine osmolality completed for further evaluation.  We will have him follow-up with his PCP in the next week.  He is agreeable with the plan, he will take himself to the ER today.  Reviewed case with Dr. Mcghee who is agreeable with plan.  -     ADH  -     OSMOLALITY URINE; Future        Return in about 1 week (around 7/14/2023) for Hypernatremia with PCP.

## 2023-07-08 LAB — OSMOLALITY UR: 476 MOSM/KG H2O (ref 300–900)

## 2023-07-14 LAB — MISCELLANEOUS LAB RESULT MISCLAB: NORMAL

## 2023-07-18 ENCOUNTER — APPOINTMENT (OUTPATIENT)
Dept: RADIOLOGY | Facility: MEDICAL CENTER | Age: 73
End: 2023-07-18
Attending: FAMILY MEDICINE
Payer: MEDICARE

## 2023-07-18 DIAGNOSIS — Z13.6 SCREENING FOR AAA (ABDOMINAL AORTIC ANEURYSM): ICD-10-CM

## 2023-07-18 PROCEDURE — 76775 US EXAM ABDO BACK WALL LIM: CPT

## 2023-07-20 PROBLEM — K80.20 CALCULUS OF GALLBLADDER WITHOUT CHOLECYSTITIS WITHOUT OBSTRUCTION: Status: ACTIVE | Noted: 2023-07-20

## 2023-07-28 ENCOUNTER — OFFICE VISIT (OUTPATIENT)
Dept: MEDICAL GROUP | Facility: MEDICAL CENTER | Age: 73
End: 2023-07-28
Payer: MEDICARE

## 2023-07-28 VITALS
SYSTOLIC BLOOD PRESSURE: 120 MMHG | HEIGHT: 67 IN | OXYGEN SATURATION: 94 % | TEMPERATURE: 97.8 F | BODY MASS INDEX: 26.11 KG/M2 | HEART RATE: 60 BPM | DIASTOLIC BLOOD PRESSURE: 78 MMHG | WEIGHT: 166.34 LBS

## 2023-07-28 DIAGNOSIS — E87.0 HYPERNATREMIA: ICD-10-CM

## 2023-07-28 DIAGNOSIS — K76.89 HEPATIC CYST: ICD-10-CM

## 2023-07-28 DIAGNOSIS — K80.20 CALCULUS OF GALLBLADDER WITHOUT CHOLECYSTITIS WITHOUT OBSTRUCTION: ICD-10-CM

## 2023-07-28 DIAGNOSIS — E78.2 MIXED HYPERLIPIDEMIA: ICD-10-CM

## 2023-07-28 DIAGNOSIS — R73.03 PREDIABETES: ICD-10-CM

## 2023-07-28 DIAGNOSIS — N40.1 BENIGN PROSTATIC HYPERPLASIA WITH URINARY RETENTION: ICD-10-CM

## 2023-07-28 DIAGNOSIS — R33.8 BENIGN PROSTATIC HYPERPLASIA WITH URINARY RETENTION: ICD-10-CM

## 2023-07-28 PROCEDURE — 99214 OFFICE O/P EST MOD 30 MIN: CPT | Performed by: FAMILY MEDICINE

## 2023-07-28 PROCEDURE — 3074F SYST BP LT 130 MM HG: CPT | Performed by: FAMILY MEDICINE

## 2023-07-28 PROCEDURE — 3078F DIAST BP <80 MM HG: CPT | Performed by: FAMILY MEDICINE

## 2023-07-28 ASSESSMENT — FIBROSIS 4 INDEX: FIB4 SCORE: 1.99

## 2023-07-28 NOTE — PROGRESS NOTES
Subjective:   CC: lab review     HPI:     Jamal Dee is a 72 y.o. male, established patient of the clinic.     Patient was recently found to have hypernatremia. He was asymptomatic, recent labs showed normalization of serum sodium.   Incidental findings of hepatic cyst and cholelithiasis noted on recent abdominal US. Patient is asymptomatic.   He has chronic hyperlipidemia, prediabetes, and BPH. These conditions are controlled.   He takes all medications as directed, no side effects reported.       Current medicines (including changes today)  Current Outpatient Medications   Medication Sig Dispense Refill    clobetasol (TEMOVATE) 0.05 % Ointment Apply 1 Application topically 2 times a day as needed.      loratadine (CLARITIN) 10 MG Tab Take 10 mg by mouth every day.      naproxen (ALEVE) 220 MG tablet Take 220 mg by mouth every day.      Omega-3 Fatty Acids (FISH OIL) 1000 MG Cap capsule Take 1,000 mg by mouth every day.      Multiple Vitamins-Minerals (ONE DAILY MULTIVITAMIN MEN PO) Take 1 Tablet by mouth every day.      Ascorbic Acid (VITAMIN C PO) Take 1 Tablet by mouth every day.      tamsulosin (FLOMAX) 0.4 MG capsule Take 1 Capsule by mouth every day. 100 Capsule 3    dutasteride (AVODART) 0.5 MG capsule TAKE ONE CAPSULE BY MOUTH ONE TIME DAILY (Patient taking differently: Take 0.5 mg by mouth every day. TAKE ONE CAPSULE BY MOUTH ONE TIME DAILY) 100 Capsule 3    atorvastatin (LIPITOR) 40 MG Tab TAKE ONE TABLET BY MOUTH AT BEDTIME (Patient taking differently: Take 40 mg by mouth at bedtime. TAKE ONE TABLET BY MOUTH AT BEDTIME) 100 Tablet 3    metFORMIN ER (GLUCOPHAGE XR) 500 MG TABLET SR 24 HR Take 1 Tablet by mouth 2 times a day. 200 Tablet 1    VITAMIN D PO Take 1 Tablet by mouth every day.      timolol (TIMOPTIC) 0.5 % Solution Administer 1 Drop into both eyes every morning.  6    bimatoprost (LUMIGAN) 0.01 % Solution Administer 1 Drop into both eyes at bedtime.       No current  "facility-administered medications for this visit.     He  has a past medical history of Glaucoma and Hyperlipidemia.    I reviewed patient's problem list, allergies, medications, family hx, social hx with patient and update EPIC.        Objective:     /78 (BP Location: Right arm, Patient Position: Sitting, BP Cuff Size: Adult)   Pulse 60   Temp 36.6 °C (97.8 °F) (Temporal)   Ht 1.702 m (5' 7\")   Wt 75.4 kg (166 lb 5.4 oz)   SpO2 94%  Body mass index is 26.05 kg/m².    Physical Exam:  Constitutional: awake, alert, in no distress.  Skin: Warm, dry, good turgor, no rashes, bruises, ulcers in visible areas.  Eye: conjunctiva clear, lids neg for edema or lesions.  Respiratory: Unlabored respiratory effort, lungs clear to auscultation, no wheezes, no rales.  Cardiovascular: Normal S1, S2, no murmur, no pedal edema.  Abdomen: Soft, non-tender to palpation, active BS, no hernia, no hepatosplenomegaly, negative rebound or guarding.   Psych: Oriented x3, affect and mood wnl, intact judgement and insight.       Assessment and Plan:   The following treatment plan was discussed    1. Hypernatremia  Resolved, will monitor     2. Hepatic cysts  Incidental findings of hepatic cyst on recent abdominal US.   I discussed this findings with patient. Reassurance provided.     3. Calculus of gallbladder without cholecystitis without obstruction  Incidental findings of cholelithiasis on recent abdominal US.   Patient is asymptomatic, will continue to monitor.   Discussed indications for cholecystectomy.   S/s of acute cholecystitis discussed with patient, recommended ER visit should these symptoms develop.     4. Mixed hyperlipidemia  Chronic, controlled with Lipitor 40 mg qd, no s/e reported, will continue.    - Lipid Profile; Future    5. Prediabetes  Chronic, controlled with Metformin  mg BID, no s/e reported, will continue.    - Comp Metabolic Panel; Future  - HEMOGLOBIN A1C; Future  - CBC WITH DIFFERENTIAL; " Future    6. BPH (benign prostatic hypertrophy) with urinary retention  Chronic, controlled with Dutasteride 0.5 mg and Tamsulosin 0.4 mg, no s/e reported, will continue.    - PSA TOTAL + %FREE; Future       Ly YENI Agosto M.D.      Followup: Return in about 6 months (around 1/28/2024) for Multiple issues.    Please note that this dictation was created using voice recognition software. I have made every reasonable attempt to correct obvious errors, but I expect that there are errors of grammar and possibly content that I did not discover before finalizing the note.

## 2023-08-07 RX ORDER — CLOBETASOL PROPIONATE 0.5 MG/G
OINTMENT TOPICAL
Qty: 30 G | Refills: 0 | Status: SHIPPED | OUTPATIENT
Start: 2023-08-07 | End: 2023-09-05

## 2023-09-05 RX ORDER — CLOBETASOL PROPIONATE 0.5 MG/G
OINTMENT TOPICAL
Qty: 30 G | Refills: 0 | Status: SHIPPED | OUTPATIENT
Start: 2023-09-05 | End: 2023-10-14

## 2023-09-21 ENCOUNTER — OFFICE VISIT (OUTPATIENT)
Dept: MEDICAL GROUP | Facility: MEDICAL CENTER | Age: 73
End: 2023-09-21
Payer: MEDICARE

## 2023-09-21 VITALS
DIASTOLIC BLOOD PRESSURE: 62 MMHG | RESPIRATION RATE: 16 BRPM | BODY MASS INDEX: 27.44 KG/M2 | HEART RATE: 71 BPM | HEIGHT: 67 IN | SYSTOLIC BLOOD PRESSURE: 112 MMHG | TEMPERATURE: 97.3 F | OXYGEN SATURATION: 92 % | WEIGHT: 174.8 LBS

## 2023-09-21 DIAGNOSIS — R68.89 FLU-LIKE SYMPTOMS: ICD-10-CM

## 2023-09-21 DIAGNOSIS — U07.1 COVID-19: ICD-10-CM

## 2023-09-21 DIAGNOSIS — J40 BRONCHITIS: ICD-10-CM

## 2023-09-21 LAB
FLUAV RNA SPEC QL NAA+PROBE: NEGATIVE
FLUBV RNA SPEC QL NAA+PROBE: NEGATIVE
RSV RNA SPEC QL NAA+PROBE: NEGATIVE
SARS-COV-2 RNA RESP QL NAA+PROBE: POSITIVE

## 2023-09-21 PROCEDURE — 99213 OFFICE O/P EST LOW 20 MIN: CPT | Performed by: STUDENT IN AN ORGANIZED HEALTH CARE EDUCATION/TRAINING PROGRAM

## 2023-09-21 PROCEDURE — 3078F DIAST BP <80 MM HG: CPT | Performed by: STUDENT IN AN ORGANIZED HEALTH CARE EDUCATION/TRAINING PROGRAM

## 2023-09-21 PROCEDURE — 0241U POCT CEPHEID COV-2, FLU A/B, RSV - PCR: CPT | Performed by: STUDENT IN AN ORGANIZED HEALTH CARE EDUCATION/TRAINING PROGRAM

## 2023-09-21 PROCEDURE — 3074F SYST BP LT 130 MM HG: CPT | Performed by: STUDENT IN AN ORGANIZED HEALTH CARE EDUCATION/TRAINING PROGRAM

## 2023-09-21 RX ORDER — METHYLPREDNISOLONE 4 MG/1
TABLET ORAL
Qty: 21 TABLET | Refills: 0 | Status: SHIPPED
Start: 2023-09-21 | End: 2023-09-21

## 2023-09-21 RX ORDER — ALBUTEROL SULFATE 90 UG/1
2 AEROSOL, METERED RESPIRATORY (INHALATION) EVERY 4 HOURS PRN
Qty: 1 EACH | Refills: 0 | Status: SHIPPED
Start: 2023-09-21 | End: 2024-02-27

## 2023-09-21 RX ORDER — AZITHROMYCIN 250 MG/1
TABLET, FILM COATED ORAL
Qty: 6 TABLET | Refills: 0 | Status: SHIPPED
Start: 2023-09-21 | End: 2023-09-21

## 2023-09-21 ASSESSMENT — FIBROSIS 4 INDEX: FIB4 SCORE: 1.99

## 2023-09-21 NOTE — PROGRESS NOTES
Chief Complaint   Patient presents with    Cough     Sneezing, runny nose x  4 days         HPI: Patient is a 72 y.o. male complaining of 3 days of illness including: productive cough, nasal congestion, rhinorrhea.   Mucus is: thin.  Similarly ill exposures: no.  Treatments tried: vicks cough medication  He  reports that he quit smoking about 39 years ago. His smoking use included cigarettes. He started smoking about 54 years ago. He has a 22.5 pack-year smoking history. He has never used smokeless tobacco..     ROS:  No fever, nausea, changes in bowel movements or skin rash.     I reviewed the patient's medications, allergies and medical history:  Current Outpatient Medications   Medication Sig Dispense Refill    albuterol 108 (90 Base) MCG/ACT Aero Soln inhalation aerosol Inhale 2 Puffs every four hours as needed for Shortness of Breath. 1 Each 0    Nirmatrelvir&Ritonavir 300/100 20 x 150 MG & 10 x 100MG Tablet Therapy Pack Take 300 mg nirmatrelvir (two 150 mg tablets) with 100 mg ritonavir (one 100 mg tablet) by mouth, with all three tablets taken together twice daily for 5 days. 30 Each 0    clobetasol (TEMOVATE) 0.05 % Ointment apply to fingers twice daily until symptoms resolve 30 g 0    loratadine (CLARITIN) 10 MG Tab Take 10 mg by mouth every day.      naproxen (ALEVE) 220 MG tablet Take 220 mg by mouth every day.      Omega-3 Fatty Acids (FISH OIL) 1000 MG Cap capsule Take 1,000 mg by mouth every day.      Multiple Vitamins-Minerals (ONE DAILY MULTIVITAMIN MEN PO) Take 1 Tablet by mouth every day.      Ascorbic Acid (VITAMIN C PO) Take 1 Tablet by mouth every day.      tamsulosin (FLOMAX) 0.4 MG capsule Take 1 Capsule by mouth every day. 100 Capsule 3    metFORMIN ER (GLUCOPHAGE XR) 500 MG TABLET SR 24 HR Take 1 Tablet by mouth 2 times a day. 200 Tablet 1    VITAMIN D PO Take 1 Tablet by mouth every day.      timolol (TIMOPTIC) 0.5 % Solution Administer 1 Drop into both eyes every morning.  6    bimatoprost  "(LUMIGAN) 0.01 % Solution Administer 1 Drop into both eyes at bedtime.      dutasteride (AVODART) 0.5 MG capsule TAKE ONE CAPSULE BY MOUTH ONE TIME DAILY (Patient taking differently: Take 0.5 mg by mouth every day. TAKE ONE CAPSULE BY MOUTH ONE TIME DAILY) 100 Capsule 3    atorvastatin (LIPITOR) 40 MG Tab TAKE ONE TABLET BY MOUTH AT BEDTIME (Patient taking differently: Take 40 mg by mouth at bedtime. TAKE ONE TABLET BY MOUTH AT BEDTIME) 100 Tablet 3     No current facility-administered medications for this visit.     Other environmental  Past Medical History:   Diagnosis Date    Glaucoma     Hyperlipidemia         EXAM:  /62 (BP Location: Left arm, Patient Position: Sitting, BP Cuff Size: Large adult)   Pulse 71   Temp 36.3 °C (97.3 °F) (Temporal)   Resp 16   Ht 1.702 m (5' 7\")   Wt 79.3 kg (174 lb 12.8 oz)   SpO2 92%   General: Alert, no conversational dyspnea or audible wheeze, non-toxic appearance.  Eyes: PERRL, conjunctiva slightly injected, no eye discharge.  Ears: Normal pinnae,TM's normal bilaterally.  Nares: Patent with thin mucus.  Sinuses: non tender over maxillary / frontal sinuses.  Throat: Erythematous injection without exudate.   Neck: Supple, with no adenopathy.  Lungs: Clear to auscultation bilaterally, no wheeze, crackles or rhonchi.   Heart: Regular rate without murmur.  Skin: Warm and dry without rash.     ASSESSMENT:   Flu-like symptoms   Bronchitis  COVID-19  - POCT Cepheid CoV-2, Flu A/B, RSV - PCR    Positive for COVID      PLAN:  Since patient is 65+ and h/o DELMA,prediabetes and former smoker - will treat him with paxlovid. Normal kidney function so full dose prescribed. Albuterol inhaler as needed   1. Educated patient isolation for at least 5 days .ER precautions discussed with patient. Wife is also sick with cough x 1 day. Recommended to get tested for COVID.  2. Twice daily use of nasal saline rinse or Neti-Pot.  3. OTC anti-pyretics and decongestants as needed.  4. ER or " urgent care for worsening symptoms, difficulty breathing, lack of expected recovery, or should new symptoms or problems arise.    - albuterol 108 (90 Base) MCG/ACT Aero Soln inhalation aerosol; Inhale 2 Puffs every four hours as needed for Shortness of Breath.  Dispense: 1 Each; Refill: 0  - Nirmatrelvir&Ritonavir 300/100 20 x 150 MG & 10 x 100MG Tablet Therapy Pack; Take 300 mg nirmatrelvir (two 150 mg tablets) with 100 mg ritonavir (one 100 mg tablet) by mouth, with all three tablets taken together twice daily for 5 days.  Dispense: 30 Each; Refill: 0

## 2023-10-14 RX ORDER — CLOBETASOL PROPIONATE 0.5 MG/G
OINTMENT TOPICAL
Qty: 30 G | Refills: 0 | Status: SHIPPED | OUTPATIENT
Start: 2023-10-14 | End: 2024-01-29

## 2024-01-11 ENCOUNTER — APPOINTMENT (OUTPATIENT)
Dept: MEDICAL GROUP | Facility: PHYSICIAN GROUP | Age: 74
End: 2024-01-11
Attending: FAMILY MEDICINE
Payer: MEDICARE

## 2024-01-29 RX ORDER — CLOBETASOL PROPIONATE 0.5 MG/G
OINTMENT TOPICAL
Qty: 30 G | Refills: 0 | Status: SHIPPED | OUTPATIENT
Start: 2024-01-29

## 2024-02-27 ENCOUNTER — OFFICE VISIT (OUTPATIENT)
Dept: FAMILY PLANNING/WOMEN'S HEALTH CLINIC | Facility: PHYSICIAN GROUP | Age: 74
End: 2024-02-27
Attending: FAMILY MEDICINE
Payer: MEDICARE

## 2024-02-27 VITALS
HEIGHT: 66 IN | WEIGHT: 165 LBS | BODY MASS INDEX: 26.52 KG/M2 | SYSTOLIC BLOOD PRESSURE: 116 MMHG | DIASTOLIC BLOOD PRESSURE: 70 MMHG

## 2024-02-27 DIAGNOSIS — E78.2 MIXED HYPERLIPIDEMIA: ICD-10-CM

## 2024-02-27 DIAGNOSIS — R73.03 PREDIABETES: ICD-10-CM

## 2024-02-27 DIAGNOSIS — R33.8 BENIGN PROSTATIC HYPERPLASIA WITH URINARY RETENTION: ICD-10-CM

## 2024-02-27 DIAGNOSIS — G47.33 OSA ON CPAP: Chronic | ICD-10-CM

## 2024-02-27 DIAGNOSIS — N40.1 BENIGN PROSTATIC HYPERPLASIA WITH URINARY RETENTION: ICD-10-CM

## 2024-02-27 PROCEDURE — G0439 PPPS, SUBSEQ VISIT: HCPCS

## 2024-02-27 PROCEDURE — 3078F DIAST BP <80 MM HG: CPT

## 2024-02-27 PROCEDURE — 3074F SYST BP LT 130 MM HG: CPT

## 2024-02-27 PROCEDURE — 1126F AMNT PAIN NOTED NONE PRSNT: CPT

## 2024-02-27 SDOH — ECONOMIC STABILITY: INCOME INSECURITY: IN THE LAST 12 MONTHS, WAS THERE A TIME WHEN YOU WERE NOT ABLE TO PAY THE MORTGAGE OR RENT ON TIME?: NO

## 2024-02-27 SDOH — ECONOMIC STABILITY: HOUSING INSECURITY: IN THE LAST 12 MONTHS, HOW MANY PLACES HAVE YOU LIVED?: 1

## 2024-02-27 SDOH — ECONOMIC STABILITY: FOOD INSECURITY: WITHIN THE PAST 12 MONTHS, YOU WORRIED THAT YOUR FOOD WOULD RUN OUT BEFORE YOU GOT MONEY TO BUY MORE.: NEVER TRUE

## 2024-02-27 SDOH — ECONOMIC STABILITY: FOOD INSECURITY: WITHIN THE PAST 12 MONTHS, THE FOOD YOU BOUGHT JUST DIDN'T LAST AND YOU DIDN'T HAVE MONEY TO GET MORE.: NEVER TRUE

## 2024-02-27 SDOH — ECONOMIC STABILITY: INCOME INSECURITY: HOW HARD IS IT FOR YOU TO PAY FOR THE VERY BASICS LIKE FOOD, HOUSING, MEDICAL CARE, AND HEATING?: NOT HARD AT ALL

## 2024-02-27 SDOH — ECONOMIC STABILITY: TRANSPORTATION INSECURITY
IN THE PAST 12 MONTHS, HAS LACK OF TRANSPORTATION KEPT YOU FROM MEETINGS, WORK, OR FROM GETTING THINGS NEEDED FOR DAILY LIVING?: NO

## 2024-02-27 SDOH — ECONOMIC STABILITY: TRANSPORTATION INSECURITY
IN THE PAST 12 MONTHS, HAS THE LACK OF TRANSPORTATION KEPT YOU FROM MEDICAL APPOINTMENTS OR FROM GETTING MEDICATIONS?: NO

## 2024-02-27 ASSESSMENT — FIBROSIS 4 INDEX: FIB4 SCORE: 2.02

## 2024-02-27 ASSESSMENT — ACTIVITIES OF DAILY LIVING (ADL): BATHING_REQUIRES_ASSISTANCE: 0

## 2024-02-27 ASSESSMENT — PAIN SCALES - GENERAL: PAINLEVEL: NO PAIN

## 2024-02-27 ASSESSMENT — PATIENT HEALTH QUESTIONNAIRE - PHQ9: CLINICAL INTERPRETATION OF PHQ2 SCORE: 0

## 2024-02-27 ASSESSMENT — ENCOUNTER SYMPTOMS: GENERAL WELL-BEING: GOOD

## 2024-02-27 NOTE — ASSESSMENT & PLAN NOTE
Chronic, stable. Compliant with CPAP at night. Does not require supplemental O2. Follows with pulmonology.

## 2024-02-27 NOTE — ASSESSMENT & PLAN NOTE
Chronic, stable. Last A1c in July 2023 was 5.6. His prior A1c had been in the prediabetic range. We discussed his dietary/lifestyle regimen. Follow up with PCP for continued monitoring.

## 2024-02-27 NOTE — ASSESSMENT & PLAN NOTE
Chronic, stable. He states he experiences urinary frequency. He is currently taking tamsulosin 0.4mg daily as well as dutasteride 0.5 mg daily. States this has greatly improved his symptoms during the night. Follow up with PCP for further monitoring.

## 2024-02-27 NOTE — ASSESSMENT & PLAN NOTE
Chronic, stable. Last lipid panel in May 2023 was WNL. He currently takes atorvastatin 40 mg nightly. We discussed his dietary/lifestyle regimen. Follow up with PCP for continued monitoring and management.  Lab Results   Component Value Date/Time    CHOLSTRLTOT 121 05/30/2023 07:53 AM    TRIGLYCERIDE 130 05/30/2023 07:53 AM    HDL 42 05/30/2023 07:53 AM    LDL 53 05/30/2023 07:53 AM

## 2024-02-28 NOTE — PROGRESS NOTES
Comprehensive Health Assessment Program     Jamal Dee is a 73 y.o. here for his comprehensive health assessment.    Patient Active Problem List    Diagnosis Date Noted    Calculus of gallbladder without cholecystitis without obstruction 07/20/2023    Former smoker 06/06/2023    FHx: colon cancer 02/18/2020    Recurrent genital herpes 03/26/2019    Chronic nonseasonal allergic rhinitis due to pollen 03/27/2018    Mixed hyperlipidemia 04/04/2017    BPH (benign prostatic hypertrophy) with urinary retention 04/04/2017    Prediabetes 04/04/2017    DELMA on CPAP 05/06/2016       Current Outpatient Medications   Medication Sig Dispense Refill    clobetasol (TEMOVATE) 0.05 % Ointment apply to fingers twice daily until symptoms resolve 30 g 0    naproxen (ALEVE) 220 MG tablet Take 220 mg by mouth every day.      Omega-3 Fatty Acids (FISH OIL) 1000 MG Cap capsule Take 1,000 mg by mouth every day.      Ascorbic Acid (VITAMIN C PO) Take 1 Tablet by mouth every day.      tamsulosin (FLOMAX) 0.4 MG capsule Take 1 Capsule by mouth every day. 100 Capsule 3    dutasteride (AVODART) 0.5 MG capsule TAKE ONE CAPSULE BY MOUTH ONE TIME DAILY (Patient taking differently: Take 0.5 mg by mouth every day. TAKE ONE CAPSULE BY MOUTH ONE TIME DAILY) 100 Capsule 3    atorvastatin (LIPITOR) 40 MG Tab TAKE ONE TABLET BY MOUTH AT BEDTIME (Patient taking differently: Take 40 mg by mouth at bedtime. TAKE ONE TABLET BY MOUTH AT BEDTIME) 100 Tablet 3    metFORMIN ER (GLUCOPHAGE XR) 500 MG TABLET SR 24 HR Take 1 Tablet by mouth 2 times a day. 200 Tablet 1    VITAMIN D PO Take 1 Tablet by mouth every day.      timolol (TIMOPTIC) 0.5 % Solution Administer 1 Drop into both eyes every morning.  6    bimatoprost (LUMIGAN) 0.01 % Solution Administer 1 Drop into both eyes at bedtime.       No current facility-administered medications for this visit.          Current supplements as per medication list.     Allergies:   Other environmental  Social  History     Tobacco Use    Smoking status: Former     Current packs/day: 0.00     Average packs/day: 1.5 packs/day for 15.0 years (22.5 ttl pk-yrs)     Types: Cigarettes     Start date: 1969     Quit date: 1984     Years since quittin.1    Smokeless tobacco: Never    Tobacco comments:     quit    Vaping Use    Vaping Use: Never used   Substance Use Topics    Alcohol use: Yes     Alcohol/week: 4.2 oz     Types: 7 Glasses of wine per week     Comment: 1 glass a night    Drug use: No     Family History   Problem Relation Age of Onset    Heart Attack Father     No Known Problems Mother     Cancer Sister 62        colon cancer    No Known Problems Maternal Grandmother     No Known Problems Maternal Grandfather     No Known Problems Paternal Grandmother     No Known Problems Paternal Grandfather     No Known Problems Brother     No Known Problems Brother     No Known Problems Brother     No Known Problems Brother     Sleep Apnea Tru Berry  has a past medical history of Glaucoma and Hyperlipidemia.   Past Surgical History:   Procedure Laterality Date    CATARACT PHACO WITH IOL  2013    Performed by Theodore Guzman M.D. at [facility]    CATARACT PHACO WITH IOL  2013    Performed by Theodore Guzman M.D. at [facility]       Screening:  In the last six months have you experienced any leakage of urine? Yes, he takes medications for BPH. He does not have to wear a liner for protection.    Depression Screening  Little interest or pleasure in doing things?  0 - not at all  Feeling down, depressed , or hopeless? 0 - not at all  Patient Health Questionnaire Score: 0     If depressive symptoms identified deferred to follow up visit unless specifically addressed in assessment and plan.    Interpretation of PHQ-9 Total Score   Score Severity   1-4 No Depression   5-9 Mild Depression   10-14 Moderate Depression   15-19 Moderately Severe Depression   20-27 Severe  Depression    Screening for Cognitive Impairment  Do you or any of your friends or family members have any concern about your memory? No  Three Minute Recall (Banana, Sunrise, Chair) 3/3    James clock face with all 12 numbers and set the hands to show 20 past 8.  Yes    Cognitive concerns identified deferred for follow up unless specifically addressed in assessment and plan.    Fall Risk Assessment  Has the patient had two or more falls in the last year or any fall with injury in the last year?  No    Safety Assessment  Do you always wear your seatbelt?  Yes  Any changes to home needed to function safely? No  Difficulty hearing.  No  Patient counseled about all safety risks that were identified.    Functional Assessment ADLs  Are there any barriers preventing you from cooking for yourself or meeting nutritional needs?  No.    Are there any barriers preventing you from driving safely or obtaining transportation?  Yes. Vision at night bad  Are there any barriers preventing you from using a telephone or calling for help?  No    Are there any barriers preventing you from shopping?  No.    Are there any barriers preventing you from taking care of your own finances?  No    Are there any barriers preventing you from managing your medications?  No    Are there any barriers preventing you from showering, bathing or dressing yourself? No    Are there any barriers preventing you from doing housework or laundry? No  Are there any barriers preventing you from using the toilet?No  Are you currently engaging in any exercise or physical activity?  Yes. Treadmill  He is wanting to start going to the gym. Discussed [facility] gym membership benefits.    Self-Assessment of Health  What is your perception of your health? Good  Do you sleep more than six hours a night? Yes  In the past 7 days, how much did pain keep you from doing your normal work? None  Do you spend quality time with family or friends (virtually or in person)? Yes  Do you  usually eat a heart healthy diet that constists of a variety of fruits, vegetables, whole grains and fiber? Yes  Do you eat foods high in fat and/or Fast Food more than three times per week? No    Advance Care Planning  Do you have an Advance Directive, Living Will, Durable Power of , or POLST? Yes  Advance Directive Living Will Durable Power of    is on file      Health Maintenance Summary            Annual Wellness Visit (Yearly) Next due on 2/27/2025 02/27/2024  Level of Service: MI ANNUAL WELLNESS VISIT-INCLUDES PPPS SUBSEQUE*    06/06/2023  Level of Service: MI ANNUAL WELLNESS VISIT-INCLUDES PPPS SUBSEQUE*    06/06/2023  Visit Dx: Medicare annual wellness visit, subsequent    05/04/2023  Level of Service: MI ANNUAL WELLNESS VISIT-INCLUDES PPPS SUBSEQUE*    06/28/2022  Level of Service: MI ANNUAL WELLNESS VISIT-INCLUDES PPPS SUBSEQUE*    Only the first 5 history entries have been loaded, but more history exists.              IMM DTaP/Tdap/Td Vaccine (2 - Td or Tdap) Next due on 9/6/2027 09/06/2017  Imm Admin: Tdap Vaccine              Colorectal Cancer Screening (Colonoscopy - Every 5 Years) Next due on 9/6/2028 09/06/2023  Colonoscopy (Reason not specified)    12/18/2018  REFERRAL TO GI FOR COLONOSCOPY    11/11/2013  REFERRAL TO GI FOR COLONOSCOPY              Hepatitis C Screening  Completed      06/01/2017  Hepatitis C Antibody component of HEP C VIRUS ANTIBODY              Pneumococcal Vaccine: 65+ Years (Series Information) Completed      09/18/2018  Imm Admin: Pneumococcal polysaccharide vaccine (PPSV-23)    06/06/2017  Imm Admin: Pneumococcal Conjugate Vaccine (Prevnar/PCV-13)              Hepatitis B Vaccine (Hep B) (Series Information) Completed      11/12/2019  Imm Admin: Hepatitis B Vaccine (Adol/Adult)    05/30/2018  Imm Admin: Hepatitis B Vaccine (Adol/Adult)    03/27/2018  Imm Admin: Hepatitis B Vaccine (Adol/Adult)              Zoster (Shingles) Vaccines (Series  Information) Completed      09/28/2021  Imm Admin: Zoster Vaccine Recombinant (RZV) (SHINGRIX)    06/17/2021  Imm Admin: Zoster Vaccine Recombinant (RZV) (SHINGRIX)    01/12/2015  Imm Admin: Zoster Vaccine Live (ZVL) (Zostavax) - HISTORICAL DATA              Abdominal Aortic Aneurysm (AAA) Screening  Completed      07/18/2023  US-ABDOMINAL AORTA W/O DOPPLER              Influenza Vaccine (Series Information) Completed      08/30/2023  Imm Admin: Influenza Vaccine, Quadrivalent, Adjuvanted (Pf)    09/19/2022  Imm Admin: Influenza, Unspecified - HISTORICAL DATA    09/14/2021  Imm Admin: Influenza, Unspecified - HISTORICAL DATA    09/22/2020  Imm Admin: Influenza Vaccine Quad Inj (Pf)    09/24/2019  Imm Admin: Influenza, Unspecified - HISTORICAL DATA    Only the first 5 history entries have been loaded, but more history exists.              COVID-19 Vaccine (Series Information) Completed      11/07/2023  Imm Admin: Covid-19 Mrna (Spikevax) Moderna 12+ Years    10/06/2022  Imm Admin: MODERNA BIVALENT BOOSTER SARS-COV-2 VACCINE (6+)    05/21/2022  Imm Admin: MODERNA SARS-COV-2 VACCINE (12+)    11/04/2021  Imm Admin: MODERNA SARS-COV-2 VACCINE (12+)    02/19/2021  Imm Admin: MODERNA SARS-COV-2 VACCINE (12+)    Only the first 5 history entries have been loaded, but more history exists.              Hepatitis A Vaccine (Hep A) (Series Information) Aged Out      No completion history exists for this topic.              HPV Vaccines (Series Information) Aged Out      No completion history exists for this topic.              Polio Vaccine (Inactivated Polio) (Series Information) Aged Out      No completion history exists for this topic.              Meningococcal Immunization (Series Information) Aged Out      No completion history exists for this topic.                    Patient Care Team:  Rosina Agosto M.D. as PCP - General (Family Medicine)  Maya Gage M.D. as PCP - [facility] Paneled  Theodore Guzman M.D. as Consulting  "Physician (Ophthalmology)  [facility] (Inactive) as Consulting Physician (Gastroenterology)  [facility] as Home Health Provider (DME Supplier)  Becki Kellogg (Inactive)      Financial Resource Strain: Low Risk  (2/27/2024)    Overall Financial Resource Strain (CARDIA)     Difficulty of Paying Living Expenses: Not hard at all      Transportation Needs: No Transportation Needs (2/27/2024)    PRAPARE - Transportation     Lack of Transportation (Medical): No     Lack of Transportation (Non-Medical): No      Food Insecurity: No Food Insecurity (2/27/2024)    Hunger Vital Sign     Worried About Running Out of Food in the Last Year: Never true     Ran Out of Food in the Last Year: Never true        Encounter Vitals  Blood Pressure : 116/70  Weight: 74.8 kg (165 lb)  Height: 167.6 cm (5' 6\")  BMI (Calculated): 26.63  Pain Score: No pain     Alert, oriented in no acute distress.  Eye contact is good, speech goal directed, affect calm.    Assessment and Plan. The following treatment and monitoring plan is recommended:  BPH (benign prostatic hypertrophy) with urinary retention  Chronic, stable. He states he experiences urinary frequency. He is currently taking tamsulosin 0.4mg daily as well as dutasteride 0.5 mg daily. States this has greatly improved his symptoms during the night. Follow up with PCP for further monitoring.      Mixed hyperlipidemia  Chronic, stable. Last lipid panel in May 2023 was WNL. He currently takes atorvastatin 40 mg nightly. We discussed his dietary/lifestyle regimen. Follow up with PCP for continued monitoring and management.  Lab Results   Component Value Date/Time    CHOLSTRLTOT 121 05/30/2023 07:53 AM    TRIGLYCERIDE 130 05/30/2023 07:53 AM    HDL 42 05/30/2023 07:53 AM    LDL 53 05/30/2023 07:53 AM         DELMA on CPAP  Chronic, stable. Compliant with CPAP at night. Does not require supplemental O2. Follows with pulmonology.    Prediabetes  Chronic, stable. Last A1c in July " 2023 was 5.6. His prior A1c had been in the prediabetic range. We discussed his dietary/lifestyle regimen. Follow up with PCP for continued monitoring.      Services suggested: No services needed at this time  Health Care Screening: Age-appropriate preventive services recommended by USPTF and ACIP covered by Medicare were discussed today. Services ordered if indicated and agreed upon by the patient.  Referrals offered: Community-based lifestyle interventions to reduce health risks and promote self-management and wellness, fall prevention, nutrition, physical activity, tobacco-use cessation, weight loss, and mental health services as per orders if indicated.    Discussion today about general wellness and lifestyle habits:    Prevent falls and reduce trip hazards; Cautioned about securing or removing rugs.  Have a working fire alarm and carbon monoxide detector.  Engage in regular physical activity and social activities.    Follow-up: Return for appointment with Primary Care Provider as needed..

## 2024-03-18 ENCOUNTER — DOCUMENTATION (OUTPATIENT)
Dept: HEALTH INFORMATION MANAGEMENT | Facility: OTHER | Age: 74
End: 2024-03-18
Payer: MEDICARE

## 2024-04-25 ENCOUNTER — OFFICE VISIT (OUTPATIENT)
Dept: SLEEP MEDICINE | Facility: MEDICAL CENTER | Age: 74
End: 2024-04-25
Attending: NURSE PRACTITIONER
Payer: MEDICARE

## 2024-04-25 VITALS
BODY MASS INDEX: 26.16 KG/M2 | OXYGEN SATURATION: 95 % | HEIGHT: 67 IN | SYSTOLIC BLOOD PRESSURE: 100 MMHG | WEIGHT: 166.7 LBS | HEART RATE: 50 BPM | RESPIRATION RATE: 16 BRPM | DIASTOLIC BLOOD PRESSURE: 60 MMHG

## 2024-04-25 DIAGNOSIS — Z87.891 FORMER SMOKER: ICD-10-CM

## 2024-04-25 DIAGNOSIS — G47.33 OSA ON CPAP: Chronic | ICD-10-CM

## 2024-04-25 PROCEDURE — 3074F SYST BP LT 130 MM HG: CPT | Performed by: NURSE PRACTITIONER

## 2024-04-25 PROCEDURE — 99213 OFFICE O/P EST LOW 20 MIN: CPT | Performed by: NURSE PRACTITIONER

## 2024-04-25 PROCEDURE — 3078F DIAST BP <80 MM HG: CPT | Performed by: NURSE PRACTITIONER

## 2024-04-25 ASSESSMENT — FIBROSIS 4 INDEX: FIB4 SCORE: 2.02

## 2024-04-25 NOTE — PROGRESS NOTES
Chief Complaint   Patient presents with    Follow-Up     [facility] - [facility]  DELMA  Last seen 4/6/23 - SUMIT Melendrez.  CPAP @9 cm  Annual fv       HPI:  Jamal Dee is a 73 y.o. year old male here today for follow-up on DELMA.  Last OV 4/6/23     Currently using CPAP @ 9cm H20 nightly; RESMED; device obtained 2022.   Compliance report 3/26/2024 through 4/24/2024 indicates 100% compliance, average nightly use 8 hours 14 minutes, moderate mask leak with overall AHI of 6.8/h.  Reviewed with patient.    He tolerates mask and pressure well.  He does not have a SIM card for his device.  He denies morning headaches or shortness of breath.  He does not nap on a regular basis but maybe once every 2 weeks for 1 hour at the most.  He goes to bed between 11/midnight taking an hour to fall asleep and will occasionally wake up if the dog needs to go out 1 time and generally able to resume sleep till 7 AM.  He denies any significant change in health over the last year.  No cardiac or respiratory complaints.    Sleep hx:   PSG 5/16 indicates severe DELMA with an AHI 55.6/h O2 jake 60%.  He was successfully titrated to CPAP 10 cm but had intolerance issues and pressures dropped to 6 cm with elevated AHI currently using CPAP 8 cm H2O nightly. Device obtained in 2016.          ROS: As per HPI and otherwise negative if not stated.    Past Medical History:   Diagnosis Date    Glaucoma     Hyperlipidemia        Past Surgical History:   Procedure Laterality Date    CATARACT PHACO WITH IOL  5/16/2013    Performed by Theodore Guzman M.D. at [facility]    CATARACT PHACO WITH IOL  5/2/2013    Performed by Theodore Guzman M.D. at [facility]       Family History   Problem Relation Age of Onset    Heart Attack Father     No Known Problems Mother     Cancer Sister 62        colon cancer    No Known Problems Maternal Grandmother     No Known Problems Maternal Grandfather     No Known Problems Paternal  Grandmother     No Known Problems Paternal Grandfather     No Known Problems Brother     No Known Problems Brother     No Known Problems Brother     No Known Problems Brother     Sleep Apnea Neg Hx        Social History     Socioeconomic History    Marital status:      Spouse name: Not on file    Number of children: 2    Years of education: Not on file    Highest education level: Not on file   Occupational History    Not on file   Tobacco Use    Smoking status: Former     Current packs/day: 0.00     Average packs/day: 1.5 packs/day for 15.0 years (22.5 ttl pk-yrs)     Types: Cigarettes     Start date: 1969     Quit date: 1984     Years since quittin.2    Smokeless tobacco: Never    Tobacco comments:     quit    Vaping Use    Vaping Use: Never used   Substance and Sexual Activity    Alcohol use: Yes     Alcohol/week: 4.2 oz     Types: 7 Glasses of wine per week     Comment: 1 glass a night    Drug use: No    Sexual activity: Not Currently     Partners: Female   Other Topics Concern    Not on file   Social History Narrative    Not on file     Social Determinants of Health     Financial Resource Strain: Low Risk  (2024)    Overall Financial Resource Strain (CARDIA)     Difficulty of Paying Living Expenses: Not hard at all   Food Insecurity: No Food Insecurity (2024)    Hunger Vital Sign     Worried About Running Out of Food in the Last Year: Never true     Ran Out of Food in the Last Year: Never true   Transportation Needs: No Transportation Needs (2024)    PRAPARE - Transportation     Lack of Transportation (Medical): No     Lack of Transportation (Non-Medical): No   Physical Activity: Not on file   Stress: Not on file   Social Connections: Not on file   Intimate Partner Violence: Unknown (2020)    Humiliation, Afraid, Rape, and Kick questionnaire     Fear of Current or Ex-Partner: Patient declined     Emotionally Abused: Patient declined     Physically Abused: Patient  "declined     Sexually Abused: Patient declined   Housing Stability: Low Risk  (2/27/2024)    Housing Stability Vital Sign     Unable to Pay for Housing in the Last Year: No     Number of Places Lived in the Last Year: 1     Unstable Housing in the Last Year: No       Allergies as of 04/25/2024 - Reviewed 04/25/2024   Allergen Reaction Noted    Other environmental Unspecified 04/04/2017        Vitals:  /60 (BP Location: Left arm, Patient Position: Sitting, BP Cuff Size: Adult)   Pulse (!) 50   Resp 16   Ht 1.702 m (5' 7\")   Wt 75.6 kg (166 lb 11.2 oz)   SpO2 95%     Current medications as of today   Current Outpatient Medications   Medication Sig Dispense Refill    clobetasol (TEMOVATE) 0.05 % Ointment apply to fingers twice daily until symptoms resolve 30 g 0    naproxen (ALEVE) 220 MG tablet Take 220 mg by mouth every day.      Omega-3 Fatty Acids (FISH OIL) 1000 MG Cap capsule Take 1,000 mg by mouth every day.      Ascorbic Acid (VITAMIN C PO) Take 1 Tablet by mouth every day.      tamsulosin (FLOMAX) 0.4 MG capsule Take 1 Capsule by mouth every day. 100 Capsule 3    dutasteride (AVODART) 0.5 MG capsule TAKE ONE CAPSULE BY MOUTH ONE TIME DAILY (Patient taking differently: Take 0.5 mg by mouth every day. TAKE ONE CAPSULE BY MOUTH ONE TIME DAILY) 100 Capsule 3    atorvastatin (LIPITOR) 40 MG Tab TAKE ONE TABLET BY MOUTH AT BEDTIME (Patient taking differently: Take 40 mg by mouth at bedtime. TAKE ONE TABLET BY MOUTH AT BEDTIME) 100 Tablet 3    metFORMIN ER (GLUCOPHAGE XR) 500 MG TABLET SR 24 HR Take 1 Tablet by mouth 2 times a day. 200 Tablet 1    VITAMIN D PO Take 1 Tablet by mouth every day.      timolol (TIMOPTIC) 0.5 % Solution Administer 1 Drop into both eyes every morning.  6    bimatoprost (LUMIGAN) 0.01 % Solution Administer 1 Drop into both eyes at bedtime.       No current facility-administered medications for this visit.         Physical Exam:   Gen:           Alert and oriented, No apparent " distress. Mood and affect appropriate, normal interaction with examiner.  Eyes:          PERRL, EOM intact, sclere white, conjunctive moist. Glasses.  Ears:          Not examined.   Hearing:     Grossly intact.  Nose:          Normal, no lesions or deformities.  Dentition:    Not examined.   Oropharynx:   Not examined.   Mallampati Classification: Not examined.   Neck:        Supple, trachea midline, no masses.  Respiratory Effort: No intercostal retractions or use of accessory muscles.   Lung Auscultation:      Clear to auscultation bilaterally; no rales, rhonchi or wheezing.  CV:            Regular rate and rhythm. No murmurs, rubs or gallops.  Abd:           Not examined.   Lymphadenopathy: Not examined.  Gait and Station: Normal.  Digits and Nails: No clubbing, cyanosis, petechiae, or nodes.   Cranial Nerves: II-XII grossly intact.  Skin:        No rashes, lesions or ulcers noted.               Ext:           No cyanosis or edema.      Assessment:  1. DELMA on CPAP        2. BMI 26.0-26.9,adult        3. Former smoker            Immunizations:    Flu:8/30/23  Pneumovax 23:2018  Prevnar 13:2017  PCV 20: not due  COVID-19: 11/7/23    Plan:  DELMA is well-controlled.  He will continue to benefit from CPAP 9 cm nightly.   DME mask/supplies  Follow primary care for other health concerns.  Encourage healthy diet and regular activity.  Follow-up in 1 year with compliance report, sooner if needed.    Please note that this dictation was created using voice recognition software. I have made every reasonable attempt to correct obvious errors, but it is possible there are errors of grammar and possibly content that I did not discover before finalizing the note.

## 2024-05-03 ENCOUNTER — APPOINTMENT (OUTPATIENT)
Dept: MEDICAL GROUP | Facility: MEDICAL CENTER | Age: 74
End: 2024-05-03
Payer: MEDICARE

## 2024-05-03 VITALS
OXYGEN SATURATION: 98 % | HEIGHT: 67 IN | SYSTOLIC BLOOD PRESSURE: 122 MMHG | WEIGHT: 179 LBS | TEMPERATURE: 97.3 F | HEART RATE: 62 BPM | DIASTOLIC BLOOD PRESSURE: 70 MMHG | BODY MASS INDEX: 28.09 KG/M2

## 2024-05-03 DIAGNOSIS — F51.01 PRIMARY INSOMNIA: ICD-10-CM

## 2024-05-03 DIAGNOSIS — G89.29 CHRONIC MIDLINE LOW BACK PAIN WITHOUT SCIATICA: ICD-10-CM

## 2024-05-03 DIAGNOSIS — Z79.1 NSAID LONG-TERM USE: ICD-10-CM

## 2024-05-03 DIAGNOSIS — S40.021A HEMATOMA OF ARM, RIGHT, INITIAL ENCOUNTER: ICD-10-CM

## 2024-05-03 DIAGNOSIS — I70.0 ATHEROSCLEROSIS OF AORTA (HCC): ICD-10-CM

## 2024-05-03 DIAGNOSIS — M53.3 SACROILIAC JOINT PAIN: ICD-10-CM

## 2024-05-03 DIAGNOSIS — M54.50 CHRONIC MIDLINE LOW BACK PAIN WITHOUT SCIATICA: ICD-10-CM

## 2024-05-03 PROCEDURE — 3078F DIAST BP <80 MM HG: CPT | Performed by: FAMILY MEDICINE

## 2024-05-03 PROCEDURE — 3074F SYST BP LT 130 MM HG: CPT | Performed by: FAMILY MEDICINE

## 2024-05-03 PROCEDURE — 99214 OFFICE O/P EST MOD 30 MIN: CPT | Performed by: FAMILY MEDICINE

## 2024-05-03 RX ORDER — TRAZODONE HYDROCHLORIDE 50 MG/1
25-50 TABLET ORAL NIGHTLY PRN
Qty: 90 TABLET | Refills: 0 | Status: SHIPPED | OUTPATIENT
Start: 2024-05-03

## 2024-05-03 RX ORDER — METHYLPREDNISOLONE 4 MG/1
TABLET ORAL
Qty: 21 TABLET | Refills: 0 | Status: SHIPPED | OUTPATIENT
Start: 2024-05-03 | End: 2024-05-30

## 2024-05-03 ASSESSMENT — FIBROSIS 4 INDEX: FIB4 SCORE: 2.02

## 2024-05-05 PROBLEM — I70.0 ATHEROSCLEROSIS OF AORTA (HCC): Status: ACTIVE | Noted: 2024-05-05

## 2024-05-06 NOTE — PROGRESS NOTES
Verbal consent was acquired by the patient to use [employer] ambient listening note generation during this visit: YES    CC: right arm hematoma     Assessment & Plan:     1. Sacroiliac joint pain  2. Chronic midline low back pain without sciatica  3. NSAID long-term use  History and exam are concerning for SI arthralgia and chronic lower back pain. Patient has been on chronic NSAID for a number of years. I had long discussion with patient regarding risks associated with chronic NSAID. Recommended taking Tylenol 500 mg Q6H PRN for pain. NSAID should be taken on PRN basis only. Will refer patient to physical therapy.    - Referral to Physical Therapy  - DX-LUMBAR SPINE-2 OR 3 VIEWS; Future  - DX-SACROILIAC JOINTS 3+; Future  - rest, activity modification   - regular exercises as tolerated   - maintain healthy body weight.     4. Hematoma of arm, right, initial encounter  History and exam are concerning for painless large hematoma of the right arm. Excessive bleeding is likely secondary to chronic, long-term use of NSAID as discussed above. The hematomas appear to be resolving on exam. Conservative management recommended.     5. Primary insomnia  - traZODone (DESYREL) 50 MG Tab; Take 0.5-1 Tablets by mouth at bedtime as needed for Sleep.  Dispense: 90 Tablet; Refill: 0       HCC Gap Form    Diagnosis to address: I70.0 - Atherosclerosis of aorta (HCC)  Assessment and plan: Chronic, stable. Will continue to monitor.   Last edited 05/05/24 21:38 PDT by Rosina Agosto M.D.             Subjective:       HPI:   History of Present Illness  The patient presents for evaluation of multiple medical concerns.    Approximately one week ago, the patient experienced pain in his right arm during a golf session a few weeks ago, prompting him to cease the activity. He had mild pain initially, but the pain has resolved soon after. The large bruises on his right arm have shown improvement, albeit with residual discomfort. He denies any  specific trauma to the area. His current regimen includes daily Aleve for couple years to manage lower back pain, which he discontinued due to the onset of arm pain, and subsequently switched to ibuprofen. He does not take aspirin. He recalls a similar incident a few years ago when he strained his arm in incorrect directions during a snow problem.    The patient also reports bilateral low back pain and SI joint pain for a number of years. He takes NSAID daily to manage his symptoms. So far, he denies any GI or kidney problems. Pain is described as sharp, most bothersome in the morning with moderate stiffness. Pain is also bothersome after a long day of golf. Denies radiating pain to the legs with paresthesia. He denies any history of falls or trauma to the area.        Current Outpatient Medications:     methylPREDNISolone (MEDROL DOSEPAK) 4 MG Tablet Therapy Pack, 6 tabs day 1, 5 tabs day 2, 4 tabs day 3, 3 tabs day 4, 2 tabs day 5, 1 tab day 6. Take with food., Disp: 21 Tablet, Rfl: 0    traZODone (DESYREL) 50 MG Tab, Take 0.5-1 Tablets by mouth at bedtime as needed for Sleep., Disp: 90 Tablet, Rfl: 0    clobetasol (TEMOVATE) 0.05 % Ointment, apply to fingers twice daily until symptoms resolve, Disp: 30 g, Rfl: 0    Omega-3 Fatty Acids (FISH OIL) 1000 MG Cap capsule, Take 1,000 mg by mouth every day., Disp: , Rfl:     Ascorbic Acid (VITAMIN C PO), Take 1 Tablet by mouth every day., Disp: , Rfl:     tamsulosin (FLOMAX) 0.4 MG capsule, Take 1 Capsule by mouth every day., Disp: 100 Capsule, Rfl: 3    dutasteride (AVODART) 0.5 MG capsule, TAKE ONE CAPSULE BY MOUTH ONE TIME DAILY (Patient taking differently: Take 0.5 mg by mouth every day. TAKE ONE CAPSULE BY MOUTH ONE TIME DAILY), Disp: 100 Capsule, Rfl: 3    atorvastatin (LIPITOR) 40 MG Tab, TAKE ONE TABLET BY MOUTH AT BEDTIME (Patient taking differently: Take 40 mg by mouth at bedtime. TAKE ONE TABLET BY MOUTH AT BEDTIME), Disp: 100 Tablet, Rfl: 3    metFORMIN ER  "(GLUCOPHAGE XR) 500 MG TABLET SR 24 HR, Take 1 Tablet by mouth 2 times a day., Disp: 200 Tablet, Rfl: 1    VITAMIN D PO, Take 1 Tablet by mouth every day., Disp: , Rfl:     timolol (TIMOPTIC) 0.5 % Solution, Administer 1 Drop into both eyes every morning., Disp: , Rfl: 6    bimatoprost (LUMIGAN) 0.01 % Solution, Administer 1 Drop into both eyes at bedtime., Disp: , Rfl:      Objective:     Exam:  /70 (BP Location: Left arm, Patient Position: Sitting, BP Cuff Size: Adult long)   Pulse 62   Temp 36.3 °C (97.3 °F) (Temporal)   Ht 1.702 m (5' 7\")   Wt 81.2 kg (179 lb)   SpO2 98%   BMI 28.04 kg/m²  Body mass index is 28.04 kg/m².    Constitutional: awake, alert, in no distress.  Skin: Warm, dry, good turgor, no rashes, bruises, ulcers in visible areas.  - large, painless, resolving hematomas noted on the right arm.   Eye: conjunctiva clear, lids neg for edema or lesions.  Neck: Trachea midline, no masses, no thyromegaly. No cervical or supraclavicular lymphadenopathy  Respiratory: Unlabored respiratory effort, lungs clear to auscultation, no wheezes, no rales.  Cardiovascular: Normal S1, S2, no murmur, no pedal edema.  Psych: Oriented x3, affect and mood wnl, intact judgement and insight.   MSK: Physical Exam  Musculoskeletal:      Lumbar back: Spasms and tenderness present. No swelling, edema, deformity, signs of trauma, lacerations or bony tenderness. Decreased range of motion. Negative right straight leg raise test and negative left straight leg raise test. No scoliosis.        Back:       Comments: Pain to palpation along SI joint bilaterally.                Return in about 3 months (around 8/3/2024) for Multiple issues.          Please note that this dictation was created using voice recognition software. I have made every reasonable attempt to correct obvious errors, but I expect that there are errors of grammar and possibly content that I did not discover before finalizing the note.        "

## 2024-05-08 ENCOUNTER — APPOINTMENT (OUTPATIENT)
Dept: RADIOLOGY | Facility: MEDICAL CENTER | Age: 74
End: 2024-05-08
Attending: FAMILY MEDICINE
Payer: MEDICARE

## 2024-05-08 DIAGNOSIS — G89.29 CHRONIC MIDLINE LOW BACK PAIN WITHOUT SCIATICA: ICD-10-CM

## 2024-05-08 DIAGNOSIS — M54.50 CHRONIC MIDLINE LOW BACK PAIN WITHOUT SCIATICA: ICD-10-CM

## 2024-05-08 DIAGNOSIS — M53.3 SACROILIAC JOINT PAIN: ICD-10-CM

## 2024-05-15 ENCOUNTER — PATIENT MESSAGE (OUTPATIENT)
Dept: MEDICAL GROUP | Facility: MEDICAL CENTER | Age: 74
End: 2024-05-15

## 2024-05-15 ENCOUNTER — PHYSICAL THERAPY (OUTPATIENT)
Dept: PHYSICAL THERAPY | Facility: MEDICAL CENTER | Age: 74
End: 2024-05-15
Attending: FAMILY MEDICINE
Payer: MEDICARE

## 2024-05-15 DIAGNOSIS — M79.601 RIGHT ARM PAIN: ICD-10-CM

## 2024-05-15 DIAGNOSIS — M43.6 STIFF NECK: ICD-10-CM

## 2024-05-15 DIAGNOSIS — M54.50 CHRONIC MIDLINE LOW BACK PAIN WITHOUT SCIATICA: ICD-10-CM

## 2024-05-15 DIAGNOSIS — G89.29 CHRONIC MIDLINE LOW BACK PAIN WITHOUT SCIATICA: ICD-10-CM

## 2024-05-15 ASSESSMENT — ENCOUNTER SYMPTOMS
ALLEVIATING FACTORS: SITTING DOWN
PAIN TIMING: IN THE MORNING
PAIN SCALE AT LOWEST: 3
EXACERBATED BY: NOTHING
ALLEVIATING FACTORS: WALKING
PAIN SCALE AT HIGHEST: 8
PAIN SCALE: 6

## 2024-05-15 NOTE — OP THERAPY EVALUATION
Outpatient Physical Therapy  INITIAL EVALUATION    [facility]  [address]  [place] [address]  Phone:  [phone]  Fax:  [phone]    Date of Evaluation: 05/15/2024    Patient: Jamal Dee  YOB: 1950  MRN: [id number]     Referring Provider: Rosina Agosto M.D.  [address] [facility] [address]  [place] [address]   Referring Diagnosis Chronic midline low back pain without sciatica [M54.50, G89.29]     Time Calculation  Start time: 1116  Stop time: 1200 Time Calculation (min): 44 minutes         Chief Complaint: Back Problem    Visit Diagnoses     ICD-10-CM   1. Chronic midline low back pain without sciatica  M54.50    G89.29       Date of onset of impairment: 5/15/2020    Subjective:   History of Present Illness:     Date of onset:  5/15/2020    Mechanism of injury:  Pt reports about 3 weeks ago he was playing golf and had a lot of back pain following playing 18 holes. Pt reports he has a long standing history of low back pain. He reports he feels he may not be as flexible than he used to be. He reports his back is more painful in the morning than during the day.   Pain:     Current pain ratin    At best pain rating:  3    At worst pain ratin    Pain timing:  In the morning    Relieving factors:  Sitting down and walking    Aggravating factors:  Nothing    Progression:  Worsening  Social Support:     Lives in:  Multiple-level home  Patient Goals:     Patient goals for therapy:  Return to sport/leisure activities and decreased pain      Past Medical History:   Diagnosis Date    Glaucoma     Hyperlipidemia      Past Surgical History:   Procedure Laterality Date    CATARACT PHACO WITH IOL  2013    Performed by Theodore Guzman M.D. at [facility]    CATARACT PHACO WITH IOL  2013    Performed by Theodore Guzman M.D. at [facility]     Social History     Tobacco Use    Smoking status:  Former     Current packs/day: 0.00     Average packs/day: 1.5 packs/day for 15.0 years (22.5 ttl pk-yrs)     Types: Cigarettes     Start date: 1969     Quit date: 1984     Years since quittin.3    Smokeless tobacco: Never    Tobacco comments:     quit    Substance Use Topics    Alcohol use: Yes     Alcohol/week: 4.2 oz     Types: 7 Glasses of wine per week     Comment: 1 glass a night     Family and Occupational History     Socioeconomic History    Marital status:      Spouse name: Not on file    Number of children: 2    Years of education: Not on file    Highest education level: Not on file   Occupational History    Not on file       Objective     Hip Screen   Hip joint mobility within functional limits    Neurological Testing     Myotome testing   Lumbar (left)   All left lumbar myotomes within normal limits    Lumbar (right)   All right lumbar myotomes within normal limits    Dermatome testing   Lumbar (left)   All left lumbar dermatomes intact    Lumbar (right)   All right lumbar dermatomes intact    Active Range of Motion     Lumbar   All lumbar active range of motion within functional limits    Passive Range of Motion     Lumbar   All lumbar passive range of motion within functional limits    Tests     Left Hip   SLR: Negative.     Right Hip   SLR: Negative.         Therapeutic Exercises (CPT 33273):     1. Bridge, x10, HEP    2. LTR, x10, HEP      Therapeutic Exercise Summary: Pt was educated today regarding current condition, expectations for rehab potential and appropriate exercise program for home. HEP was given to patient in print out form and instructions were communicated to pt.         Time-based treatments/modalities:           Assessment, Response and Plan:   Impairments: activity intolerance, hypersensitivity and pain with function    Assessment details:  Pt is a 74 y/o M presenting to PT with signs and symptoms consistent with non-specific low back pain secondary to imaged  spondylosis. Pt has deficits of pain. Given pt's age, overall health, current condition and adherence to PT recommendations, anticipated duration of episode is 8 weeks.      Prognosis: fair    Goals:   Short Term Goals:   1. Pt will be able to perform 10x sit to stand pain free or near pain free (2/10 or lower on numeric pain rating scale)    Short term goal time span:  2-4 weeks      Long Term Goals:    1. Pt will be able to return to all ADL's and recreational activities pain free or near pain free (2/10 or lower on numeric pain rating scale)  2. Pt will have reduction of back pain symptoms by 50% or greater  Long term goal time span:  6-8 weeks    Plan:   Therapy options:  Physical therapy treatment to continue  Planned therapy interventions:  Neuromuscular Re-education (CPT 53975), Therapeutic Exercise (CPT 52883), Therapeutic Activities (CPT 39811), Manual Therapy (CPT 96784) and Mechanical Traction (CPT 36891)  Frequency:  2x week  Duration in weeks:  8  Discussed with:  Patient      Functional Assessment Used  Tommie Jamar Low Back Pain and Disability Score: 12.5     Referring provider co-signature:  I have reviewed this plan of care and my co-signature certifies the need for services.    Certification Period: 05/15/2024 to  07/14/24    Physician Signature: ________________________________ Date: ______________                  No

## 2024-05-17 ENCOUNTER — PHYSICAL THERAPY (OUTPATIENT)
Dept: PHYSICAL THERAPY | Facility: MEDICAL CENTER | Age: 74
End: 2024-05-17
Attending: FAMILY MEDICINE
Payer: MEDICARE

## 2024-05-17 DIAGNOSIS — M54.50 CHRONIC MIDLINE LOW BACK PAIN WITHOUT SCIATICA: ICD-10-CM

## 2024-05-17 DIAGNOSIS — G89.29 CHRONIC MIDLINE LOW BACK PAIN WITHOUT SCIATICA: ICD-10-CM

## 2024-05-17 NOTE — OP THERAPY DAILY TREATMENT
Outpatient Physical Therapy  DAILY TREATMENT     [facility]  [address]  [place] [address]  Phone:  [phone]  Fax:  [phone]    Date: 05/17/2024    Patient: Jamal Dee  YOB: 1950  MRN: [id number]     Time Calculation    Start time: 1545  Stop time: 1618 Time Calculation (min): 33 minutes         Chief Complaint: Back Problem (/)    Visit #: 2    SUBJECTIVE:  Pt reports his back is doing ok today.             Therapeutic Exercises (CPT 62948):     1. Bridge, x10, HEP    2. LTR, x10, HEP    3. SKTC, NT    4. Lumbar ball stretch, x12, HEP    5. Seated flexion/extension, x10, HEP    6. Rows, BTB 2x10, HEP    7. Hamstring stretch, x5, HEP      Therapeutic Exercise Summary: Pt was educated today regarding updated HEP.         Time-based treatments/modalities:    Physical Therapy Timed Treatment Charges  Therapeutic exercise minutes (CPT 47120): 33 minutes      Pain rating (1-10) before treatment:  0    ASSESSMENT:   Response to treatment: Pt had good tolerance for today's PT session. Pt will continue to benefit from skilled PT to address aforementioned deficits.      PLAN/RECOMMENDATIONS:   Plan for treatment: therapy treatment to continue next visit.  Planned interventions for next visit: continue with current treatment, manual therapy (CPT 80719), neuromuscular re-education (CPT 19370), therapeutic activities (CPT 53892), and therapeutic exercise (CPT 68003).

## 2024-05-22 ENCOUNTER — PHYSICAL THERAPY (OUTPATIENT)
Dept: PHYSICAL THERAPY | Facility: MEDICAL CENTER | Age: 74
End: 2024-05-22
Attending: FAMILY MEDICINE
Payer: MEDICARE

## 2024-05-22 DIAGNOSIS — M54.50 CHRONIC MIDLINE LOW BACK PAIN WITHOUT SCIATICA: ICD-10-CM

## 2024-05-22 DIAGNOSIS — G89.29 CHRONIC MIDLINE LOW BACK PAIN WITHOUT SCIATICA: ICD-10-CM

## 2024-05-22 NOTE — OP THERAPY DAILY TREATMENT
Outpatient Physical Therapy  DAILY TREATMENT     [facility] Outpatient Physical Therapy  [address]  [place] [address]  Phone:  [phone]  Fax:  [phone]    Date: 05/22/2024    Patient: Jamal Dee  YOB: 1950  MRN: [id number]     Time Calculation    Start time: 1331  Stop time: 1409 Time Calculation (min): 38 minutes         Chief Complaint: Back Problem    Visit #: 3    SUBJECTIVE:  Pt reports some of the newer exercises seem to have aggravated his back.             Therapeutic Exercises (CPT 32132):     1. Marching bridge, 2x10, HEP    2. LTR, 2x12, HEP    3. Lumbar ball stretch (3 way), x10, HEP    4. Rows, BTB 2x10, HEP    5. Hamstring stretch, x5, HEP    6. Levator scap stretch, x5, HEP      Therapeutic Exercise Summary: Pt was educated today regarding regression of program as well as return to golf expectations.         Time-based treatments/modalities:    Physical Therapy Timed Treatment Charges  Therapeutic exercise minutes (CPT 94536): 38 minutes      ASSESSMENT:   Response to treatment: Pt had good tolerance for today's PT session. Pt required adjustment of exercise program to better tolerate current programming. Pt will continue to benefit from skilled PT to address aforementioned deficits.      PLAN/RECOMMENDATIONS:   Plan for treatment: therapy treatment to continue next visit.  Planned interventions for next visit: continue with current treatment, neuromuscular re-education (CPT 93930), therapeutic activities (CPT 06775), and therapeutic exercise (CPT 82276).

## 2024-05-24 ENCOUNTER — HOSPITAL ENCOUNTER (OUTPATIENT)
Dept: LAB | Facility: MEDICAL CENTER | Age: 74
End: 2024-05-24
Attending: FAMILY MEDICINE
Payer: MEDICARE

## 2024-05-24 DIAGNOSIS — R33.8 BENIGN PROSTATIC HYPERPLASIA WITH URINARY RETENTION: ICD-10-CM

## 2024-05-24 DIAGNOSIS — R73.03 PREDIABETES: ICD-10-CM

## 2024-05-24 DIAGNOSIS — E78.2 MIXED HYPERLIPIDEMIA: ICD-10-CM

## 2024-05-24 DIAGNOSIS — N40.1 BENIGN PROSTATIC HYPERPLASIA WITH URINARY RETENTION: ICD-10-CM

## 2024-05-24 LAB
ALBUMIN SERPL BCP-MCNC: 4.3 G/DL (ref 3.2–4.9)
ALBUMIN/GLOB SERPL: 1.5 G/DL
ALP SERPL-CCNC: 75 U/L (ref 30–99)
ALT SERPL-CCNC: 24 U/L (ref 2–50)
ANION GAP SERPL CALC-SCNC: 13 MMOL/L (ref 7–16)
AST SERPL-CCNC: 21 U/L (ref 12–45)
BASOPHILS # BLD AUTO: 0.7 % (ref 0–1.8)
BASOPHILS # BLD: 0.03 K/UL (ref 0–0.12)
BILIRUB SERPL-MCNC: 0.7 MG/DL (ref 0.1–1.5)
BUN SERPL-MCNC: 11 MG/DL (ref 8–22)
CALCIUM ALBUM COR SERPL-MCNC: 8.9 MG/DL (ref 8.5–10.5)
CALCIUM SERPL-MCNC: 9.1 MG/DL (ref 8.4–10.2)
CHLORIDE SERPL-SCNC: 106 MMOL/L (ref 96–112)
CHOLEST SERPL-MCNC: 118 MG/DL (ref 100–199)
CO2 SERPL-SCNC: 24 MMOL/L (ref 20–33)
CREAT SERPL-MCNC: 0.84 MG/DL (ref 0.5–1.4)
EOSINOPHIL # BLD AUTO: 0.12 K/UL (ref 0–0.51)
EOSINOPHIL NFR BLD: 2.6 % (ref 0–6.9)
ERYTHROCYTE [DISTWIDTH] IN BLOOD BY AUTOMATED COUNT: 47.4 FL (ref 35.9–50)
EST. AVERAGE GLUCOSE BLD GHB EST-MCNC: 120 MG/DL
FASTING STATUS PATIENT QL REPORTED: NORMAL
GFR SERPLBLD CREATININE-BSD FMLA CKD-EPI: 91 ML/MIN/1.73 M 2
GLOBULIN SER CALC-MCNC: 2.9 G/DL (ref 1.9–3.5)
GLUCOSE SERPL-MCNC: 103 MG/DL (ref 65–99)
HBA1C MFR BLD: 5.8 % (ref 4–5.6)
HCT VFR BLD AUTO: 48.3 % (ref 42–52)
HDLC SERPL-MCNC: 48 MG/DL
HGB BLD-MCNC: 15.7 G/DL (ref 14–18)
IMM GRANULOCYTES # BLD AUTO: 0.02 K/UL (ref 0–0.11)
IMM GRANULOCYTES NFR BLD AUTO: 0.4 % (ref 0–0.9)
LDLC SERPL CALC-MCNC: 42 MG/DL
LYMPHOCYTES # BLD AUTO: 1.02 K/UL (ref 1–4.8)
LYMPHOCYTES NFR BLD: 22.3 % (ref 22–41)
MCH RBC QN AUTO: 30.4 PG (ref 27–33)
MCHC RBC AUTO-ENTMCNC: 32.5 G/DL (ref 32.3–36.5)
MCV RBC AUTO: 93.6 FL (ref 81.4–97.8)
MONOCYTES # BLD AUTO: 0.3 K/UL (ref 0–0.85)
MONOCYTES NFR BLD AUTO: 6.6 % (ref 0–13.4)
NEUTROPHILS # BLD AUTO: 3.09 K/UL (ref 1.82–7.42)
NEUTROPHILS NFR BLD: 67.4 % (ref 44–72)
NRBC # BLD AUTO: 0 K/UL
NRBC BLD-RTO: 0 /100 WBC (ref 0–0.2)
PLATELET # BLD AUTO: 183 K/UL (ref 164–446)
PMV BLD AUTO: 9.9 FL (ref 9–12.9)
POTASSIUM SERPL-SCNC: 3.9 MMOL/L (ref 3.6–5.5)
PROT SERPL-MCNC: 7.2 G/DL (ref 6–8.2)
RBC # BLD AUTO: 5.16 M/UL (ref 4.7–6.1)
SODIUM SERPL-SCNC: 143 MMOL/L (ref 135–145)
TRIGL SERPL-MCNC: 140 MG/DL (ref 0–149)
WBC # BLD AUTO: 4.6 K/UL (ref 4.8–10.8)

## 2024-05-26 DIAGNOSIS — G89.29 CHRONIC MIDLINE LOW BACK PAIN WITHOUT SCIATICA: ICD-10-CM

## 2024-05-26 DIAGNOSIS — M54.50 CHRONIC MIDLINE LOW BACK PAIN WITHOUT SCIATICA: ICD-10-CM

## 2024-05-26 LAB
PSA FREE MFR SERPL: 25 %
PSA FREE SERPL-MCNC: 0.2 NG/ML
PSA SERPL-MCNC: 0.8 NG/ML (ref 0–4)

## 2024-05-29 ENCOUNTER — PHYSICAL THERAPY (OUTPATIENT)
Dept: PHYSICAL THERAPY | Facility: MEDICAL CENTER | Age: 74
End: 2024-05-29
Attending: FAMILY MEDICINE
Payer: MEDICARE

## 2024-05-29 DIAGNOSIS — G89.29 CHRONIC MIDLINE LOW BACK PAIN WITHOUT SCIATICA: ICD-10-CM

## 2024-05-29 DIAGNOSIS — M54.50 CHRONIC MIDLINE LOW BACK PAIN WITHOUT SCIATICA: ICD-10-CM

## 2024-05-29 NOTE — OP THERAPY DAILY TREATMENT
Outpatient Physical Therapy  DAILY TREATMENT     [facility] Outpatient [facility] Therapy  [address]  [place] [address]  Phone:  [phone]  Fax:  [phone]    Date: 05/29/2024    Patient: Jamal Dee  YOB: 1950  MRN: [id number]     Time Calculation    Start time: 1545  Stop time: 1620 Time Calculation (min): 35 minutes         Chief Complaint: Back Problem    Visit #: 4    SUBJECTIVE:  Pt reports exercises are going well for him and that he is noticing some improvement.            Therapeutic Exercises (CPT 41617):     1. Marching bridge, 2x10, HEP    2. LTR, 2x12, HEP    3. Lumbar ball stretch (3 way), x10, NT - verbal review    4. Rows, BTB 2x10, NT    5. Hamstring stretch, x5, NT    6. Levator scap stretch, x5, HEP    7. Thoracic rotation stretch, x5, HEP    8. Golf swing rotation mobility, x20, HEP    9. Sidelying hip abduction, 2x10, HEP      Therapeutic Exercise Summary: Pt was educated today regarding follow through and mobility drills to address deficits of golf swing where he most prominently gets pain.       Time-based treatments/modalities:    Physical Therapy Timed Treatment Charges  Therapeutic exercise minutes (CPT 00030): 35 minutes        ASSESSMENT:   Response to treatment: Pt continues to have good relief with current POC. His swing for golf was analyzed today, it was clear patient was avoidant of full swing follow through and movement coaching was provided to emphasize follow through and pt was instructed in mobility drill to address limitations and avoidance of follow through.     PLAN/RECOMMENDATIONS:   Plan for treatment: therapy treatment to continue next visit.  Planned interventions for next visit: continue with current treatment, manual therapy (CPT 95803), neuromuscular re-education (CPT 83135), therapeutic activities (CPT 77859), and therapeutic exercise (CPT 85906).

## 2024-05-30 RX ORDER — METHYLPREDNISOLONE 4 MG/1
TABLET ORAL
Qty: 21 TABLET | Refills: 0 | Status: SHIPPED | OUTPATIENT
Start: 2024-05-30

## 2024-06-05 ENCOUNTER — APPOINTMENT (OUTPATIENT)
Dept: PHYSICAL THERAPY | Facility: MEDICAL CENTER | Age: 74
End: 2024-06-05
Attending: FAMILY MEDICINE
Payer: MEDICARE

## 2024-06-11 ENCOUNTER — PHYSICAL THERAPY (OUTPATIENT)
Dept: PHYSICAL THERAPY | Facility: MEDICAL CENTER | Age: 74
End: 2024-06-11
Attending: FAMILY MEDICINE
Payer: MEDICARE

## 2024-06-11 DIAGNOSIS — G89.29 CHRONIC MIDLINE LOW BACK PAIN WITHOUT SCIATICA: ICD-10-CM

## 2024-06-11 DIAGNOSIS — M54.50 CHRONIC MIDLINE LOW BACK PAIN WITHOUT SCIATICA: ICD-10-CM

## 2024-06-11 PROCEDURE — 97110 THERAPEUTIC EXERCISES: CPT

## 2024-06-11 NOTE — OP THERAPY PROGRESS SUMMARY
Outpatient Physical Therapy  PROGRESS SUMMARY NOTE      [facility] Outpatient Physical Therapy  [address]  [place] [address]  Phone:  [phone]  Fax:  [phone]    Date of Visit: 06/11/2024    Patient: Jamal Dee  YOB: 1950  MRN: [id number]     Referring Provider: Rosina Agosto M.D.  [address] [facility]  [address]  [place] [address]   Referring Diagnosis Low back pain, unspecified [M54.50];Other chronic pain [G89.29]     Visit Diagnoses     ICD-10-CM   1. Chronic midline low back pain without sciatica  M54.50    G89.29       Rehab Potential: fair    Progress Report Period: 5/15/24-6/11/24    Functional Assessment Used          Objective Findings and Assessment:   Patient progression towards goals: Short Term Goals:   1. Pt will be able to perform 10x sit to stand pain free or near pain free (2/10 or lower on numeric pain rating scale) (achieved)      Long Term Goals:    1. Pt will be able to return to all ADL's and recreational activities pain free or near pain free (2/10 or lower on numeric pain rating scale) (not achieved)  2. Pt will have reduction of back pain symptoms by 50% or greater (not achieved)      Objective findings and assessment details: Pt has had improvement of upper back symptoms and improved tolerance for sitting and standing, his kneeling to standing tolerance continues to aggravate his back. Pt does report mild improvement with golf and other recreational activities. Pt's greatest deficit which appears to most contribute to LBP is his hamstring length. Continued skilled PT will focus on addressing hamstring flexibility.     Goals:   Short Term Goals:   1. Pt will be able to return to all ADL's and recreational activities pain free or near pain free (2/10 or lower on numeric pain rating scale)     Short term goal time span:  2-4 weeks      Long Term Goals:    1. Pt will have reduction of back pain symptoms by 50% or greater    Long term goal time span:  6-8 weeks    Plan:   Planned therapy interventions:  Neuromuscular Re-education (CPT 39383), Manual Therapy (CPT 19201), Therapeutic Exercise (CPT 11845) and Therapeutic Activities (CPT 19083)  Frequency:  1x week  Duration in weeks:  8      Referring provider co-signature:  I have reviewed this plan of care and my co-signature certifies the need for services.     Certification Period: 06/11/2024 to 08/10/24    Physician Signature: ________________________________ Date: ______________

## 2024-06-11 NOTE — OP THERAPY DAILY TREATMENT
Outpatient Physical Therapy  DAILY TREATMENT     [facility] Outpatient Physical Therapy  [address]  [place] [address]  Phone:  [phone]  Fax:  [phone]    Date: 06/11/2024    Patient: Jamal Dee  YOB: 1950  MRN: [id number]     Time Calculation    Start time: 1540  Stop time: 1615 Time Calculation (min): 35 minutes         Chief Complaint: Back Problem    Visit #: 5    SUBJECTIVE:  Pt reports his shoulders and neck are improving.     OBJECTIVE:  Current objective measures:   AROM B/L shoulders: WNL's  AROM lumbar spine: WNL's          Therapeutic Exercises (CPT 78019):     1. Marching bridge, 2x10, NT    2. LTR, 2x12, HEP    3. Lumbar ball stretch (3 way), x10, NT - verbal review    4. Rows, BTB 2x10, NT    5. Hamstring stretch, x5, HEP    6. Levator scap stretch, x5, NT    7. Thoracic rotation stretch, x5, NT    8. Golf swing rotation mobility, x20, NT    9. Adductor stretch, 2x10, HEP    10. Supine hamstring kick up, 2x10, HEP    11. Fig 4 piriformis stretch, x5 ea side, HEP    12. Standing hip abduction, GTB 2x10, HEP      Therapeutic Exercise Summary: Pt was educated today regarding follow through and mobility drills to address deficits of golf swing where he most prominently gets pain.     Therapeutic Treatments and Modalities:     1. Manual Therapy (CPT 49144), B/L LE's, Manual hamstring stretch (grade 2)    Time-based treatments/modalities:    Physical Therapy Timed Treatment Charges  Manual therapy minutes (CPT 40920): 10 minutes  Therapeutic exercise minutes (CPT 44856): 25 minutes      ASSESSMENT:   Response to treatment: Pt had good tolerance for today's PT session. Pt continues to have significant stiffness and decreased extensibility of the B/L hamstrings (R>L), today's session focused on hamstring mobility and flexibility. Pt will continue to benefit from skilled PT to address aforementioned deficits.      PLAN/RECOMMENDATIONS:    Plan for treatment: therapy treatment to continue next visit.  Planned interventions for next visit: continue with current treatment, manual therapy (CPT 43584), neuromuscular re-education (CPT 01842), therapeutic activities (CPT 36092), and therapeutic exercise (CPT 98844).

## 2024-06-12 ENCOUNTER — APPOINTMENT (OUTPATIENT)
Dept: PHYSICAL THERAPY | Facility: MEDICAL CENTER | Age: 74
End: 2024-06-12
Attending: FAMILY MEDICINE
Payer: MEDICARE

## 2024-06-18 ENCOUNTER — OFFICE VISIT (OUTPATIENT)
Dept: MEDICAL GROUP | Facility: MEDICAL CENTER | Age: 74
End: 2024-06-18
Payer: MEDICARE

## 2024-06-18 VITALS
WEIGHT: 167.33 LBS | SYSTOLIC BLOOD PRESSURE: 122 MMHG | HEART RATE: 68 BPM | TEMPERATURE: 97.5 F | HEIGHT: 67 IN | DIASTOLIC BLOOD PRESSURE: 70 MMHG | BODY MASS INDEX: 26.26 KG/M2 | OXYGEN SATURATION: 96 %

## 2024-06-18 DIAGNOSIS — E78.2 MIXED HYPERLIPIDEMIA: ICD-10-CM

## 2024-06-18 DIAGNOSIS — R73.03 PREDIABETES: ICD-10-CM

## 2024-06-18 DIAGNOSIS — H65.93 MIDDLE EAR EFFUSION, BILATERAL: ICD-10-CM

## 2024-06-18 DIAGNOSIS — R33.8 BENIGN PROSTATIC HYPERPLASIA WITH URINARY RETENTION: ICD-10-CM

## 2024-06-18 DIAGNOSIS — L91.8 SKIN TAG OF PERINEUM IN MALE: ICD-10-CM

## 2024-06-18 DIAGNOSIS — H61.21 IMPACTED CERUMEN OF RIGHT EAR: ICD-10-CM

## 2024-06-18 DIAGNOSIS — N40.1 BENIGN PROSTATIC HYPERPLASIA WITH URINARY RETENTION: ICD-10-CM

## 2024-06-18 DIAGNOSIS — F51.01 PRIMARY INSOMNIA: ICD-10-CM

## 2024-06-18 PROCEDURE — 99214 OFFICE O/P EST MOD 30 MIN: CPT | Mod: 25 | Performed by: FAMILY MEDICINE

## 2024-06-18 PROCEDURE — 3078F DIAST BP <80 MM HG: CPT | Performed by: FAMILY MEDICINE

## 2024-06-18 PROCEDURE — 69210 REMOVE IMPACTED EAR WAX UNI: CPT | Mod: RT | Performed by: FAMILY MEDICINE

## 2024-06-18 PROCEDURE — 3074F SYST BP LT 130 MM HG: CPT | Performed by: FAMILY MEDICINE

## 2024-06-18 RX ORDER — METHYLPREDNISOLONE 4 MG/1
TABLET ORAL
Qty: 21 TABLET | Refills: 0 | Status: SHIPPED | OUTPATIENT
Start: 2024-06-18

## 2024-06-18 RX ORDER — ATORVASTATIN CALCIUM 40 MG/1
TABLET, FILM COATED ORAL
Qty: 100 TABLET | Refills: 3 | Status: SHIPPED | OUTPATIENT
Start: 2024-06-18

## 2024-06-18 RX ORDER — TAMSULOSIN HYDROCHLORIDE 0.4 MG/1
0.4 CAPSULE ORAL DAILY
Qty: 100 CAPSULE | Refills: 3 | Status: SHIPPED | OUTPATIENT
Start: 2024-06-18

## 2024-06-18 RX ORDER — DUTASTERIDE 0.5 MG/1
CAPSULE, LIQUID FILLED ORAL
Qty: 100 CAPSULE | Refills: 3 | Status: SHIPPED | OUTPATIENT
Start: 2024-06-18

## 2024-06-18 RX ORDER — METFORMIN HYDROCHLORIDE 500 MG/1
500 TABLET, EXTENDED RELEASE ORAL 2 TIMES DAILY
Qty: 200 TABLET | Refills: 1 | Status: SHIPPED | OUTPATIENT
Start: 2024-06-18

## 2024-06-18 RX ORDER — TRAZODONE HYDROCHLORIDE 50 MG/1
25-50 TABLET ORAL NIGHTLY PRN
Qty: 90 TABLET | Refills: 1 | Status: SHIPPED | OUTPATIENT
Start: 2024-06-18

## 2024-06-18 ASSESSMENT — FIBROSIS 4 INDEX: FIB4 SCORE: 1.73

## 2024-06-18 NOTE — PROGRESS NOTES
Verbal consent was acquired by the patient to use [employer] ambient listening note generation during this visit: YES    CC: Labs review, skin lesion on his scrotum    Assessment & Plan:     1. Mixed hyperlipidemia  Chronic, controlled with Lipitor 40 mg daily, no s/e reported, will continue.    - atorvastatin (LIPITOR) 40 MG Tab; TAKE ONE TABLET BY MOUTH AT BEDTIME  Dispense: 100 Tablet; Refill: 3  - Lipid Profile; Future    2. Prediabetes  Chronic, controlled with metformin  mg twice daily, no s/e reported, will continue.    - metFORMIN ER (GLUCOPHAGE XR) 500 MG TABLET SR 24 HR; Take 1 Tablet by mouth 2 times a day.  Dispense: 200 Tablet; Refill: 1  - CBC WITH DIFFERENTIAL; Future  - Comp Metabolic Panel; Future  - HEMOGLOBIN A1C; Future    3. Skin tag of perineum in male  History and exam are concerning for several noninflamed, nontender skin tags on the scrotum.  Conservative management recommended and discussed.    4. BPH (benign prostatic hypertrophy) with urinary retention  Chronic, controlled with Flomax 0.4 mg daily and dutasteride 0.5 mg daily, no s/e reported, will continue.    - tamsulosin (FLOMAX) 0.4 MG capsule; Take 1 Capsule by mouth every day.  Dispense: 100 Capsule; Refill: 3  - dutasteride (AVODART) 0.5 MG capsule; TAKE ONE CAPSULE BY MOUTH ONE TIME DAILY  Dispense: 100 Capsule; Refill: 3  -Patient drinks about 5 cups of coffee in the morning out of habit leading to urinary frequency in the morning.  Appropriate counseling provided.    5. Primary insomnia  Chronic, controlled with trazodone 25 to 50 mg nightly as needed, no s/e reported, will continue.    - traZODone (DESYREL) 50 MG Tab; Take 0.5-1 Tablets by mouth at bedtime as needed for Sleep.  Dispense: 90 Tablet; Refill: 1    6. Middle ear effusion, bilateral  Exam was notable for moderate bilateral cerumen impaction.  - methylPREDNISolone (MEDROL DOSEPAK) 4 MG Tablet Therapy Pack; 6 tabs day 1, 5 tabs day 2, 4 tabs day 3, 3 tabs day  "4, 2 tabs day 5, 1 tab day 6. Take with food.  Dispense: 21 Tablet; Refill: 0  -Continue treatment for allergic rhinitis.    7. Impacted cerumen of right ear  - curettage     I personally removed ear wax from right ear using a lighted curette. Patient tolerated the procedure well, no immediate complication noted.      Subjective:       HPI:   History of Present Illness  The patient presents for evaluation of multiple medical concerns.    The patient's bruising has completely subsided. He is currently undergoing physical therapy, focusing on improving his back and hips, with a 40 percent reduction in symptoms. His sleep remains unaffected by pain.  Patient continues to take trazodone 25 to 50 mg nightly as needed for pain. He has completed 3 to 4 physical therapy sessions, with additional sessions remaining.    The patient reports a small, non-painful pimple in his genital area, which he first noticed approximately 6 months ago. He has a history of genital herpes, but asserts that the current \"pimples\" does not resemble it.  He is , not currently sexually active.    The patient reports frequent urination, approximately 10 times per in the morning.  He currently takes 2 medications for enlarged prostate. His urinary frequency intensifies in the morning after consuming coffee and breakfast. He consumes 4 to 5 cups of coffee daily.    The patient denies any hearing issues, but suspects an infection in his ear. He reports frequent sneezing, particularly during golfing, and takes an allergy medication daily. He denies any unusual sounds in his ears.      Current Outpatient Medications:     tamsulosin (FLOMAX) 0.4 MG capsule, Take 1 Capsule by mouth every day., Disp: 100 Capsule, Rfl: 3    metFORMIN ER (GLUCOPHAGE XR) 500 MG TABLET SR 24 HR, Take 1 Tablet by mouth 2 times a day., Disp: 200 Tablet, Rfl: 1    dutasteride (AVODART) 0.5 MG capsule, TAKE ONE CAPSULE BY MOUTH ONE TIME DAILY, Disp: 100 Capsule, Rfl: 3    " "atorvastatin (LIPITOR) 40 MG Tab, TAKE ONE TABLET BY MOUTH AT BEDTIME, Disp: 100 Tablet, Rfl: 3    traZODone (DESYREL) 50 MG Tab, Take 0.5-1 Tablets by mouth at bedtime as needed for Sleep., Disp: 90 Tablet, Rfl: 1    methylPREDNISolone (MEDROL DOSEPAK) 4 MG Tablet Therapy Pack, 6 tabs day 1, 5 tabs day 2, 4 tabs day 3, 3 tabs day 4, 2 tabs day 5, 1 tab day 6. Take with food., Disp: 21 Tablet, Rfl: 0    clobetasol (TEMOVATE) 0.05 % Ointment, apply to fingers twice daily until symptoms resolve, Disp: 30 g, Rfl: 0    Omega-3 Fatty Acids (FISH OIL) 1000 MG Cap capsule, Take 1,000 mg by mouth every day., Disp: , Rfl:     Ascorbic Acid (VITAMIN C PO), Take 1 Tablet by mouth every day., Disp: , Rfl:     VITAMIN D PO, Take 1 Tablet by mouth every day., Disp: , Rfl:     timolol (TIMOPTIC) 0.5 % Solution, Administer 1 Drop into both eyes every morning., Disp: , Rfl: 6    bimatoprost (LUMIGAN) 0.01 % Solution, Administer 1 Drop into both eyes at bedtime., Disp: , Rfl:      Objective:     Exam:  /70 (BP Location: Left arm, Patient Position: Sitting, BP Cuff Size: Adult)   Pulse 68   Temp 36.4 °C (97.5 °F) (Temporal)   Ht 1.702 m (5' 7\")   Wt 75.9 kg (167 lb 5.3 oz)   SpO2 96%   BMI 26.21 kg/m²  Body mass index is 26.21 kg/m².    Constitutional: awake, alert, in no distress.  Skin: Warm, dry, good turgor, no rashes, bruises, ulcers in visible areas.  -Nontender, noninflamed skin tags noted on the scrotum.  The rest of genital exam was unremarkable.  Eye: conjunctiva clear, lids neg for edema or lesions.  ENMT: Cerumen impaction on the right, mild middle ear effusion bilaterally auditory canals wnl. Oral and nasal mucosa wnl. Lips without lesions, good dentition, oropharynx clear.  Neck: Trachea midline, no masses, no thyromegaly. No cervical or supraclavicular lymphadenopathy  Respiratory: Unlabored respiratory effort, lungs clear to auscultation, no wheezes, no rales.  Cardiovascular: Normal S1, S2, no murmur, no " pedal edema.  Psych: Oriented x3, affect and mood wnl, intact judgement and insight.         Return in about 6 months (around 12/18/2024) for Multiple issues.          Please note that this dictation was created using voice recognition software. I have made every reasonable attempt to correct obvious errors, but I expect that there are errors of grammar and possibly content that I did not discover before finalizing the note.

## 2024-06-19 ENCOUNTER — APPOINTMENT (OUTPATIENT)
Dept: PHYSICAL THERAPY | Facility: MEDICAL CENTER | Age: 74
End: 2024-06-19
Attending: FAMILY MEDICINE
Payer: MEDICARE

## 2024-06-20 ENCOUNTER — PHYSICAL THERAPY (OUTPATIENT)
Dept: PHYSICAL THERAPY | Facility: MEDICAL CENTER | Age: 74
End: 2024-06-20
Attending: FAMILY MEDICINE
Payer: MEDICARE

## 2024-06-20 DIAGNOSIS — M54.50 CHRONIC MIDLINE LOW BACK PAIN WITHOUT SCIATICA: ICD-10-CM

## 2024-06-20 DIAGNOSIS — G89.29 CHRONIC MIDLINE LOW BACK PAIN WITHOUT SCIATICA: ICD-10-CM

## 2024-06-20 PROCEDURE — 97110 THERAPEUTIC EXERCISES: CPT

## 2024-06-20 NOTE — OP THERAPY DAILY TREATMENT
Outpatient Physical Therapy  DAILY TREATMENT     [facility]  [address]  [place] [address]  Phone:  [phone]  Fax:  [phone]    Date: 06/20/2024    Patient: Jamal Dee  YOB: 1950  MRN: [id number]     Time Calculation    Start time: 1330  Stop time: 1404 Time Calculation (min): 34 minutes         Chief Complaint: Back Problem    Visit #: 6    SUBJECTIVE:  Pt reports his back is feeling pretty good. Pt reports on Friday he went for a walk with his wife, he reports about 80% improvement overall with his back.           Therapeutic Exercises (CPT 59426):     1. Marching bridge, 2x10, HEP    2. LTR, 2x12, HEP    3. Shoudler extensions, Purple TB 2x10, HEP    4. Rows, Purple TB 2x10, HEP    5. Hamstring stretch, x5, HEP    6. Levator scap stretch, x5, NT - verbal review for HEP    7. Fig 4 piriformis stretch, x5 ea side, HEP    12. Standing hip abduction, GTB 2x10, HEP      Therapeutic Exercise Summary: Pt was educated today regarding expectations for continued independent HEP and decreased frequency of PT sessions.       Time-based treatments/modalities:    Physical Therapy Timed Treatment Charges  Therapeutic exercise minutes (CPT 10052): 34 minutes      ASSESSMENT:   Response to treatment: Pt had good tolerance for today's PT session. Pt will continue to benefit from skilled PT to address aforementioned deficits.      PLAN/RECOMMENDATIONS:   Plan for treatment: therapy treatment to continue next visit.  Planned interventions for next visit: continue with current treatment, manual therapy (CPT 68281), neuromuscular re-education (CPT 75981), therapeutic activities (CPT 63087), and therapeutic exercise (CPT 92682).

## 2024-06-26 ENCOUNTER — APPOINTMENT (OUTPATIENT)
Dept: PHYSICAL THERAPY | Facility: MEDICAL CENTER | Age: 74
End: 2024-06-26
Attending: FAMILY MEDICINE
Payer: MEDICARE

## 2024-07-24 ENCOUNTER — PHYSICAL THERAPY (OUTPATIENT)
Dept: PHYSICAL THERAPY | Facility: MEDICAL CENTER | Age: 74
End: 2024-07-24
Attending: FAMILY MEDICINE
Payer: MEDICARE

## 2024-07-24 DIAGNOSIS — M54.50 CHRONIC MIDLINE LOW BACK PAIN WITHOUT SCIATICA: ICD-10-CM

## 2024-07-24 DIAGNOSIS — G89.29 CHRONIC MIDLINE LOW BACK PAIN WITHOUT SCIATICA: ICD-10-CM

## 2024-07-24 PROCEDURE — 97110 THERAPEUTIC EXERCISES: CPT

## 2024-07-26 DIAGNOSIS — A60.00 RECURRENT GENITAL HERPES: ICD-10-CM

## 2024-07-29 RX ORDER — FAMCICLOVIR 125 MG/1
TABLET ORAL
Qty: 60 TABLET | Refills: 0 | Status: SHIPPED | OUTPATIENT
Start: 2024-07-29

## 2025-02-21 ENCOUNTER — APPOINTMENT (OUTPATIENT)
Dept: MEDICAL GROUP | Facility: MEDICAL CENTER | Age: 75
End: 2025-02-21
Payer: MEDICARE

## 2025-03-29 NOTE — TELEPHONE ENCOUNTER
Received request via: Pharmacy    Was the patient seen in the last year in this department? Yes    Does the patient have an active prescription (recently filled or refills available) for medication(s) requested?  Pharmacy requests 100 day supply per insurance    Does the patient have nursing home Plus and need 100 day supply (blood pressure, diabetes and cholesterol meds only)? Yes, quantity updated to 100 days     The patient is a 49y Female complaining of abdominal pain.

## 2025-04-02 ENCOUNTER — APPOINTMENT (OUTPATIENT)
Dept: FAMILY PLANNING/WOMEN'S HEALTH CLINIC | Facility: PHYSICIAN GROUP | Age: 75
End: 2025-04-02
Attending: FAMILY MEDICINE
Payer: MEDICARE

## 2025-04-02 DIAGNOSIS — E78.2 MIXED HYPERLIPIDEMIA: ICD-10-CM

## 2025-04-10 ENCOUNTER — HOSPITAL ENCOUNTER (OUTPATIENT)
Dept: LAB | Facility: MEDICAL CENTER | Age: 75
End: 2025-04-10
Attending: FAMILY MEDICINE
Payer: MEDICARE

## 2025-04-10 DIAGNOSIS — R73.03 PREDIABETES: ICD-10-CM

## 2025-04-10 DIAGNOSIS — E78.2 MIXED HYPERLIPIDEMIA: ICD-10-CM

## 2025-04-10 LAB
ALBUMIN SERPL BCP-MCNC: 3.8 G/DL (ref 3.2–4.9)
ALBUMIN/GLOB SERPL: 1.4 G/DL
ALP SERPL-CCNC: 83 U/L (ref 30–99)
ALT SERPL-CCNC: 30 U/L (ref 2–50)
ANION GAP SERPL CALC-SCNC: 9 MMOL/L (ref 7–16)
AST SERPL-CCNC: 19 U/L (ref 12–45)
BASOPHILS # BLD AUTO: 1.1 % (ref 0–1.8)
BASOPHILS # BLD: 0.05 K/UL (ref 0–0.12)
BILIRUB SERPL-MCNC: 0.4 MG/DL (ref 0.1–1.5)
BUN SERPL-MCNC: 14 MG/DL (ref 8–22)
CALCIUM ALBUM COR SERPL-MCNC: 9.2 MG/DL (ref 8.5–10.5)
CALCIUM SERPL-MCNC: 9 MG/DL (ref 8.5–10.5)
CHLORIDE SERPL-SCNC: 108 MMOL/L (ref 96–112)
CHOLEST SERPL-MCNC: 126 MG/DL (ref 100–199)
CO2 SERPL-SCNC: 24 MMOL/L (ref 20–33)
CREAT SERPL-MCNC: 0.78 MG/DL (ref 0.5–1.4)
EOSINOPHIL # BLD AUTO: 0.16 K/UL (ref 0–0.51)
EOSINOPHIL NFR BLD: 3.4 % (ref 0–6.9)
ERYTHROCYTE [DISTWIDTH] IN BLOOD BY AUTOMATED COUNT: 49.8 FL (ref 35.9–50)
EST. AVERAGE GLUCOSE BLD GHB EST-MCNC: 128 MG/DL
FASTING STATUS PATIENT QL REPORTED: NORMAL
GFR SERPLBLD CREATININE-BSD FMLA CKD-EPI: 93 ML/MIN/1.73 M 2
GLOBULIN SER CALC-MCNC: 2.8 G/DL (ref 1.9–3.5)
GLUCOSE SERPL-MCNC: 98 MG/DL (ref 65–99)
HBA1C MFR BLD: 6.1 % (ref 4–5.6)
HCT VFR BLD AUTO: 44 % (ref 42–52)
HDLC SERPL-MCNC: 37 MG/DL
HGB BLD-MCNC: 13.9 G/DL (ref 14–18)
IMM GRANULOCYTES # BLD AUTO: 0.04 K/UL (ref 0–0.11)
IMM GRANULOCYTES NFR BLD AUTO: 0.8 % (ref 0–0.9)
LDLC SERPL CALC-MCNC: 74 MG/DL
LYMPHOCYTES # BLD AUTO: 0.94 K/UL (ref 1–4.8)
LYMPHOCYTES NFR BLD: 19.9 % (ref 22–41)
MCH RBC QN AUTO: 29.6 PG (ref 27–33)
MCHC RBC AUTO-ENTMCNC: 31.6 G/DL (ref 32.3–36.5)
MCV RBC AUTO: 93.8 FL (ref 81.4–97.8)
MONOCYTES # BLD AUTO: 0.34 K/UL (ref 0–0.85)
MONOCYTES NFR BLD AUTO: 7.2 % (ref 0–13.4)
NEUTROPHILS # BLD AUTO: 3.2 K/UL (ref 1.82–7.42)
NEUTROPHILS NFR BLD: 67.6 % (ref 44–72)
NRBC # BLD AUTO: 0 K/UL
NRBC BLD-RTO: 0 /100 WBC (ref 0–0.2)
PLATELET # BLD AUTO: 343 K/UL (ref 164–446)
PMV BLD AUTO: 9.7 FL (ref 9–12.9)
POTASSIUM SERPL-SCNC: 4 MMOL/L (ref 3.6–5.5)
PROT SERPL-MCNC: 6.6 G/DL (ref 6–8.2)
RBC # BLD AUTO: 4.69 M/UL (ref 4.7–6.1)
SODIUM SERPL-SCNC: 141 MMOL/L (ref 135–145)
TRIGL SERPL-MCNC: 75 MG/DL (ref 0–149)
WBC # BLD AUTO: 4.7 K/UL (ref 4.8–10.8)

## 2025-04-10 PROCEDURE — 85025 COMPLETE CBC W/AUTO DIFF WBC: CPT

## 2025-04-10 PROCEDURE — 83036 HEMOGLOBIN GLYCOSYLATED A1C: CPT

## 2025-04-10 PROCEDURE — 80053 COMPREHEN METABOLIC PANEL: CPT

## 2025-04-10 PROCEDURE — 80061 LIPID PANEL: CPT

## 2025-04-10 PROCEDURE — 36415 COLL VENOUS BLD VENIPUNCTURE: CPT

## 2025-04-14 ENCOUNTER — RESULTS FOLLOW-UP (OUTPATIENT)
Dept: MEDICAL GROUP | Facility: MEDICAL CENTER | Age: 75
End: 2025-04-14
Payer: MEDICARE

## 2025-04-15 ENCOUNTER — PATIENT MESSAGE (OUTPATIENT)
Dept: MEDICAL GROUP | Facility: MEDICAL CENTER | Age: 75
End: 2025-04-15

## 2025-04-15 ENCOUNTER — OFFICE VISIT (OUTPATIENT)
Dept: MEDICAL GROUP | Facility: MEDICAL CENTER | Age: 75
End: 2025-04-15
Payer: MEDICARE

## 2025-04-15 VITALS
SYSTOLIC BLOOD PRESSURE: 116 MMHG | TEMPERATURE: 96.9 F | HEIGHT: 67 IN | HEART RATE: 63 BPM | OXYGEN SATURATION: 97 % | BODY MASS INDEX: 25.26 KG/M2 | WEIGHT: 160.94 LBS | DIASTOLIC BLOOD PRESSURE: 62 MMHG

## 2025-04-15 DIAGNOSIS — Z79.1 NSAID LONG-TERM USE: ICD-10-CM

## 2025-04-15 DIAGNOSIS — G47.33 OSA ON CPAP: Chronic | ICD-10-CM

## 2025-04-15 DIAGNOSIS — Z00.00 PE (PHYSICAL EXAM), ANNUAL: Primary | ICD-10-CM

## 2025-04-15 DIAGNOSIS — N40.1 BENIGN PROSTATIC HYPERPLASIA WITH URINARY RETENTION: ICD-10-CM

## 2025-04-15 DIAGNOSIS — G89.29 ACUTE ON CHRONIC BACK PAIN: ICD-10-CM

## 2025-04-15 DIAGNOSIS — A60.00 RECURRENT GENITAL HERPES: ICD-10-CM

## 2025-04-15 DIAGNOSIS — E78.2 MIXED HYPERLIPIDEMIA: ICD-10-CM

## 2025-04-15 DIAGNOSIS — K80.20 CALCULUS OF GALLBLADDER WITHOUT CHOLECYSTITIS WITHOUT OBSTRUCTION: ICD-10-CM

## 2025-04-15 DIAGNOSIS — F51.01 PRIMARY INSOMNIA: ICD-10-CM

## 2025-04-15 DIAGNOSIS — M54.9 ACUTE ON CHRONIC BACK PAIN: ICD-10-CM

## 2025-04-15 DIAGNOSIS — D64.9 NORMOCYTIC ANEMIA: ICD-10-CM

## 2025-04-15 DIAGNOSIS — J30.89 CHRONIC NONSEASONAL ALLERGIC RHINITIS DUE TO POLLEN: ICD-10-CM

## 2025-04-15 DIAGNOSIS — R33.8 BENIGN PROSTATIC HYPERPLASIA WITH URINARY RETENTION: ICD-10-CM

## 2025-04-15 DIAGNOSIS — R73.03 PREDIABETES: ICD-10-CM

## 2025-04-15 PROCEDURE — 3078F DIAST BP <80 MM HG: CPT | Performed by: FAMILY MEDICINE

## 2025-04-15 PROCEDURE — 3074F SYST BP LT 130 MM HG: CPT | Performed by: FAMILY MEDICINE

## 2025-04-15 PROCEDURE — 99214 OFFICE O/P EST MOD 30 MIN: CPT | Mod: 25 | Performed by: FAMILY MEDICINE

## 2025-04-15 RX ORDER — TAMSULOSIN HYDROCHLORIDE 0.4 MG/1
0.4 CAPSULE ORAL DAILY
Qty: 100 CAPSULE | Refills: 3 | Status: SHIPPED | OUTPATIENT
Start: 2025-04-15

## 2025-04-15 RX ORDER — ATORVASTATIN CALCIUM 40 MG/1
TABLET, FILM COATED ORAL
Qty: 100 TABLET | Refills: 3 | Status: SHIPPED | OUTPATIENT
Start: 2025-04-15

## 2025-04-15 RX ORDER — FAMCICLOVIR 125 MG/1
125 TABLET ORAL 3 TIMES DAILY PRN
Qty: 60 TABLET | Refills: 1 | Status: SHIPPED | OUTPATIENT
Start: 2025-04-15

## 2025-04-15 RX ORDER — METFORMIN HYDROCHLORIDE 500 MG/1
500 TABLET, EXTENDED RELEASE ORAL 2 TIMES DAILY
Qty: 200 TABLET | Refills: 0 | Status: SHIPPED | OUTPATIENT
Start: 2025-04-15

## 2025-04-15 RX ORDER — DUTASTERIDE 0.5 MG/1
CAPSULE, LIQUID FILLED ORAL
Qty: 100 CAPSULE | Refills: 3 | Status: SHIPPED | OUTPATIENT
Start: 2025-04-15

## 2025-04-15 RX ORDER — METHYLPREDNISOLONE 4 MG/1
TABLET ORAL
Qty: 21 TABLET | Refills: 0 | Status: SHIPPED | OUTPATIENT
Start: 2025-04-15

## 2025-04-15 ASSESSMENT — PATIENT HEALTH QUESTIONNAIRE - PHQ9: CLINICAL INTERPRETATION OF PHQ2 SCORE: 0

## 2025-04-15 ASSESSMENT — FIBROSIS 4 INDEX: FIB4 SCORE: 0.76

## 2025-04-16 RX ORDER — TRIAMCINOLONE ACETONIDE 1 MG/G
OINTMENT TOPICAL
Qty: 30 G | Refills: 2 | Status: SHIPPED | OUTPATIENT
Start: 2025-04-16

## 2025-04-16 NOTE — PROGRESS NOTES
Verbal consent was acquired by the patient to use [employer] listening note generation during this visit: YES    CC: aPE, acute on chronic back pain, worsening prediabetes    Assessment & Plan:     1. Mixed hyperlipidemia  Chronic, controlled with Lipitor 40 mg daily, no s/e reported, will continue.    - atorvastatin (LIPITOR) 40 MG Tab; TAKE ONE TABLET BY MOUTH AT BEDTIME  Dispense: 100 Tablet; Refill: 3    2. DELMA on CPAP  - recommended weight loss and consistent use of CPAP  - avoid alcohol, sedatives.   - Pt was previously counseled on risks of untreated DELMA including: increased risk of cardiovascular disease including coronary artery disease, systemic arterial hypertension, pulmonary arterial hypertension, cardiac arrythmias, stroke, increased risk of MVA due to drowsiness, type 2 diabetes, chronic kidney disease, and non-alcoholic liver disease.      3. BPH (benign prostatic hypertrophy) with urinary retention  Chronic, controlled with dutasteride and tamsulosin, no s/e reported, will continue.    - dutasteride (AVODART) 0.5 MG capsule; TAKE ONE CAPSULE BY MOUTH ONE TIME DAILY  Dispense: 100 Capsule; Refill: 3  - tamsulosin (FLOMAX) 0.4 MG capsule; Take 1 Capsule by mouth every day.  Dispense: 100 Capsule; Refill: 3    4. Recurrent genital herpes  - famciclovir (FAMVIR) 125 MG Tab; Take 1 Tablet by mouth 3 times a day as needed (recurrent herpes outbreaks).  Dispense: 60 Tablet; Refill: 1    5. Primary insomnia  Resolved with behavioral modification and exercises  Patient did not tolerate trazodone    6. Calculus of gallbladder without cholecystitis without obstruction  Asymptomatic, will continue to monitor    7. Chronic nonseasonal allergic rhinitis due to pollen  Chronic, controlled with over-the-counter antihistamine and Flonase, no s/e reported, will continue.      8. PE (physical exam), annual (Primary)  Labs per orders  Immunization reviewed and discussed.  Patient was counseled about  diet,  supplements, exercises.   Preventive cares reviewed, discussed, and updated as appropriate.       9. Normocytic anemia  Recent labs notable for mild normocytic anemia.  Patient had normal colonoscopy in September 2023.  Denies recent surgery, recent trauma, hematochezia, melena, unexplained weight loss, active GI symptoms, blood donation.  Patient is asymptomatic.  He does not have any dietary restrictions.  Patient does consume NSAIDs regularly for chronic low back pain which is concerned for upper GI bleed.   - CBC WITH DIFFERENTIAL; Future  - FERRITIN; Future  - FOLATE; Future  - VITAMIN B12; Future  - TSH WITH REFLEX TO FT4; Future  - IRON/TOTAL IRON BIND; Future  - OCCULT BLOOD STOOL; Future  - ER precautions discussed.   - I had a long discussion with patient regarding chronic NSAID use.  I encouraged patient to stop all NSAIDs and takes Tylenol as needed for pain.    10. Prediabetes  Chronic, A1c increases from 5.8 to 6.1.  He lost about 19 pounds over the past 6 months intentionally.  However, he has been consuming sweets likely contributing to worsening A1c. Body mass index is 25.21 kg/m².   - metFORMIN ER (GLUCOPHAGE XR) 500 MG TABLET SR 24 HR; Take 1 Tablet by mouth 2 times a day.  Dispense: 200 Tablet; Refill: 0  - Dietary modification.  Brief dietary counseling provided  - dietary modification, regular exercise  - weight loss   - avoid alcohol, illicit drugs, tobacco products     11. NSAID long-term use  12. Acute on chronic back pain  History and exam are concerning for acute on chronic back pain.  Patient has been consuming NSAIDs regularly for symptoms.  - methylPREDNISolone (MEDROL DOSEPAK) 4 MG Tablet Therapy Pack; 6 tabs day 1, 5 tabs day 2, 4 tabs day 3, 3 tabs day 4, 2 tabs day 5, 1 tab day 6. Take with food.  Dispense: 21 Tablet; Refill: 0   - I discussed health risks/complications associated with chronic consumption of NSAID. Appropriate counseling provided.    - Patient was previously working  with physical therapy which appeared to help.  He declined the need for referral to physical therapy at this time.  He will restart exercises routine learned from physical therapy.  - Maintain healthy body weight  - Over-the-counter Tylenol as needed for pain.  Avoid taking NSAIDs regularly.        Subjective:       HPI:     History of Present Illness  The patient presents for evaluation of annual PE and to discuss abnormal labs.    Recent labs notable for mild anemia.  He is asymptomatic.  Denies recent surgery, hematochezia, melena, blood donation, dietary restriction.  Last colonoscopy was in 2023 which was normal.  Patient has been consuming NSAIDs almost daily for chronic low back pain.  He used to drink a glass of wine daily.  However, he has discontinued 2 months ago.    Elevated blood glucose levels were noted, with an increase in A1c from 5.8 to 6.1. Despite losing 20 pounds since May 2024, blood sugar levels remain high. Recent chocolate consumption is admitted.  He is active and exercises regularly.  He plays golf once per week.    He continues to take the rest of his medication as directed.    He complains of acute on chronic back pain.  He previously responded well to Medrol Dosepak for acute symptoms.  He was working with physical therapy which appeared to help with he is not interested in referral to physical therapy at this time.  He will restart exercise routines learned from his therapist for treatment of back pain.  Denies lower extremity weakness, bowel/bladder symptoms.  No recent trauma to the low back.  There has been an increase in physical activities over the past few weeks.      PAST SURGICAL HISTORY:  Colonoscopy in 2023 revealing a benign polyp.    SOCIAL HISTORY  He has stopped drinking wine for the past 2 months.        Current Outpatient Medications:     atorvastatin (LIPITOR) 40 MG Tab, TAKE ONE TABLET BY MOUTH AT BEDTIME, Disp: 100 Tablet, Rfl: 3    dutasteride (AVODART) 0.5 MG  "capsule, TAKE ONE CAPSULE BY MOUTH ONE TIME DAILY, Disp: 100 Capsule, Rfl: 3    metFORMIN ER (GLUCOPHAGE XR) 500 MG TABLET SR 24 HR, Take 1 Tablet by mouth 2 times a day., Disp: 200 Tablet, Rfl: 0    tamsulosin (FLOMAX) 0.4 MG capsule, Take 1 Capsule by mouth every day., Disp: 100 Capsule, Rfl: 3    famciclovir (FAMVIR) 125 MG Tab, Take 1 Tablet by mouth 3 times a day as needed (recurrent herpes outbreaks)., Disp: 60 Tablet, Rfl: 1    methylPREDNISolone (MEDROL DOSEPAK) 4 MG Tablet Therapy Pack, 6 tabs day 1, 5 tabs day 2, 4 tabs day 3, 3 tabs day 4, 2 tabs day 5, 1 tab day 6. Take with food., Disp: 21 Tablet, Rfl: 0    clobetasol (TEMOVATE) 0.05 % Ointment, apply to fingers twice daily until symptoms resolve, Disp: 30 g, Rfl: 0    Omega-3 Fatty Acids (FISH OIL) 1000 MG Cap capsule, Take 1,000 mg by mouth every day., Disp: , Rfl:     Ascorbic Acid (VITAMIN C PO), Take 1 Tablet by mouth every day., Disp: , Rfl:     VITAMIN D PO, Take 1 Tablet by mouth every day., Disp: , Rfl:     timolol (TIMOPTIC) 0.5 % Solution, Administer 1 Drop into both eyes every morning., Disp: , Rfl: 6    bimatoprost (LUMIGAN) 0.01 % Solution, Administer 1 Drop into both eyes at bedtime., Disp: , Rfl:      Objective:     Exam:  /62 (BP Location: Right arm, Patient Position: Sitting, BP Cuff Size: Adult long)   Pulse 63   Temp 36.1 °C (96.9 °F) (Temporal)   Ht 1.702 m (5' 7\")   Wt 73 kg (160 lb 15 oz)   SpO2 97%   BMI 25.21 kg/m²  Body mass index is 25.21 kg/m².    Constitutional: awake, alert, in no distress.  Skin: Warm, dry, good turgor, no rashes, bruises, ulcers in visible areas.  Eye: conjunctiva clear, lids neg for edema or lesions.  Neck: Trachea midline, no masses, no thyromegaly. No cervical or supraclavicular lymphadenopathy  Respiratory: Unlabored respiratory effort, lungs clear to auscultation, no wheezes, no rales.  Cardiovascular: Normal S1, S2, no murmur, no pedal edema.  Psych: Oriented x3, affect and mood wnl, " intact judgement and insight.   Physical Exam  Musculoskeletal:      Lumbar back: No swelling, edema, deformity, signs of trauma, lacerations, spasms, tenderness or bony tenderness. Normal range of motion. Negative right straight leg raise test and negative left straight leg raise test. No scoliosis.       Return in about 3 months (around 7/15/2025).          Please note that this dictation was created using voice recognition software. I have made every reasonable attempt to correct obvious errors, but I expect that there are errors of grammar and possibly content that I did not discover before finalizing the note.

## 2025-05-12 PROBLEM — H40.2234 BILATERAL CHRONIC PRIMARY ANGLE-CLOSURE GLAUCOMA, INDETERMINATE STAGE: Status: ACTIVE | Noted: 2025-05-12

## 2025-05-13 ENCOUNTER — APPOINTMENT (OUTPATIENT)
Dept: FAMILY PLANNING/WOMEN'S HEALTH CLINIC | Facility: PHYSICIAN GROUP | Age: 75
End: 2025-05-13
Attending: FAMILY MEDICINE
Payer: MEDICARE

## 2025-05-28 ENCOUNTER — OFFICE VISIT (OUTPATIENT)
Dept: URGENT CARE | Facility: CLINIC | Age: 75
End: 2025-05-28
Payer: MEDICARE

## 2025-05-28 ENCOUNTER — HOSPITAL ENCOUNTER (INPATIENT)
Facility: MEDICAL CENTER | Age: 75
End: 2025-05-28
Attending: EMERGENCY MEDICINE | Admitting: HOSPITALIST
Payer: MEDICARE

## 2025-05-28 ENCOUNTER — APPOINTMENT (OUTPATIENT)
Dept: RADIOLOGY | Facility: MEDICAL CENTER | Age: 75
DRG: 417 | End: 2025-05-28
Attending: EMERGENCY MEDICINE
Payer: MEDICARE

## 2025-05-28 VITALS
DIASTOLIC BLOOD PRESSURE: 72 MMHG | TEMPERATURE: 95.6 F | HEART RATE: 65 BPM | BODY MASS INDEX: 23.61 KG/M2 | HEIGHT: 69 IN | SYSTOLIC BLOOD PRESSURE: 128 MMHG | RESPIRATION RATE: 16 BRPM | OXYGEN SATURATION: 96 % | WEIGHT: 159.4 LBS

## 2025-05-28 DIAGNOSIS — R07.89 CHEST DISCOMFORT: ICD-10-CM

## 2025-05-28 DIAGNOSIS — K85.10 ACUTE GALLSTONE PANCREATITIS: Primary | ICD-10-CM

## 2025-05-28 DIAGNOSIS — R94.31 ECG ABNORMAL: ICD-10-CM

## 2025-05-28 DIAGNOSIS — R11.2 NAUSEA AND VOMITING, UNSPECIFIED VOMITING TYPE: Primary | ICD-10-CM

## 2025-05-28 PROBLEM — R74.8 ELEVATED LIVER ENZYMES: Status: ACTIVE | Noted: 2025-05-28

## 2025-05-28 PROBLEM — D64.9 NORMOCHROMIC NORMOCYTIC ANEMIA: Status: ACTIVE | Noted: 2025-05-28

## 2025-05-28 PROBLEM — D72.829 LEUKOCYTOSIS: Status: ACTIVE | Noted: 2025-05-28

## 2025-05-28 PROBLEM — E87.6 HYPOKALEMIA: Status: ACTIVE | Noted: 2025-05-28

## 2025-05-28 LAB
ALBUMIN SERPL BCP-MCNC: 3.4 G/DL (ref 3.2–4.9)
ALBUMIN/GLOB SERPL: 0.9 G/DL
ALP SERPL-CCNC: 856 U/L (ref 30–99)
ALT SERPL-CCNC: 193 U/L (ref 2–50)
ANION GAP SERPL CALC-SCNC: 12 MMOL/L (ref 7–16)
AST SERPL-CCNC: 138 U/L (ref 12–45)
BASOPHILS # BLD AUTO: 0.1 % (ref 0–1.8)
BASOPHILS # BLD: 0.01 K/UL (ref 0–0.12)
BILIRUB SERPL-MCNC: 2.6 MG/DL (ref 0.1–1.5)
BUN SERPL-MCNC: 12 MG/DL (ref 8–22)
CALCIUM ALBUM COR SERPL-MCNC: 9.8 MG/DL (ref 8.5–10.5)
CALCIUM SERPL-MCNC: 9.3 MG/DL (ref 8.5–10.5)
CHLORIDE SERPL-SCNC: 103 MMOL/L (ref 96–112)
CO2 SERPL-SCNC: 26 MMOL/L (ref 20–33)
CREAT SERPL-MCNC: 0.74 MG/DL (ref 0.5–1.4)
EKG IMPRESSION: NORMAL
EOSINOPHIL # BLD AUTO: 0.03 K/UL (ref 0–0.51)
EOSINOPHIL NFR BLD: 0.3 % (ref 0–6.9)
ERYTHROCYTE [DISTWIDTH] IN BLOOD BY AUTOMATED COUNT: 50.1 FL (ref 35.9–50)
GFR SERPLBLD CREATININE-BSD FMLA CKD-EPI: 94 ML/MIN/1.73 M 2
GLOBULIN SER CALC-MCNC: 3.7 G/DL (ref 1.9–3.5)
GLUCOSE BLD STRIP.AUTO-MCNC: 87 MG/DL (ref 65–99)
GLUCOSE SERPL-MCNC: 94 MG/DL (ref 65–99)
HCT VFR BLD AUTO: 38.5 % (ref 42–52)
HGB BLD-MCNC: 12.3 G/DL (ref 14–18)
HGB RETIC QN AUTO: 29.9 PG/CELL (ref 29–35)
IMM GRANULOCYTES # BLD AUTO: 0.06 K/UL (ref 0–0.11)
IMM GRANULOCYTES NFR BLD AUTO: 0.5 % (ref 0–0.9)
IMM RETICS NFR: 14.1 % (ref 2.6–16.1)
IRON SATN MFR SERPL: 10 % (ref 15–55)
IRON SERPL-MCNC: 27 UG/DL (ref 50–180)
LIPASE SERPL-CCNC: >3000 U/L (ref 11–82)
LYMPHOCYTES # BLD AUTO: 0.56 K/UL (ref 1–4.8)
LYMPHOCYTES NFR BLD: 5 % (ref 22–41)
MAGNESIUM SERPL-MCNC: 2.4 MG/DL (ref 1.5–2.5)
MCH RBC QN AUTO: 29.6 PG (ref 27–33)
MCHC RBC AUTO-ENTMCNC: 31.9 G/DL (ref 32.3–36.5)
MCV RBC AUTO: 92.8 FL (ref 81.4–97.8)
MONOCYTES # BLD AUTO: 0.38 K/UL (ref 0–0.85)
MONOCYTES NFR BLD AUTO: 3.4 % (ref 0–13.4)
NEUTROPHILS # BLD AUTO: 10.07 K/UL (ref 1.82–7.42)
NEUTROPHILS NFR BLD: 90.7 % (ref 44–72)
NRBC # BLD AUTO: 0 K/UL
NRBC BLD-RTO: 0 /100 WBC (ref 0–0.2)
NT-PROBNP SERPL IA-MCNC: 180 PG/ML (ref 0–125)
PLATELET # BLD AUTO: 303 K/UL (ref 164–446)
PMV BLD AUTO: 9.6 FL (ref 9–12.9)
POTASSIUM SERPL-SCNC: 3.5 MMOL/L (ref 3.6–5.5)
PROT SERPL-MCNC: 7.1 G/DL (ref 6–8.2)
RBC # BLD AUTO: 4.15 M/UL (ref 4.7–6.1)
RETICS # AUTO: 0.07 M/UL (ref 0.04–0.12)
RETICS/RBC NFR: 1.6 % (ref 0.8–2.6)
SODIUM SERPL-SCNC: 141 MMOL/L (ref 135–145)
TIBC SERPL-MCNC: 259 UG/DL (ref 250–450)
TROPONIN T SERPL-MCNC: 11 NG/L (ref 6–19)
UIBC SERPL-MCNC: 232 UG/DL (ref 110–370)
WBC # BLD AUTO: 11.1 K/UL (ref 4.8–10.8)

## 2025-05-28 PROCEDURE — 82962 GLUCOSE BLOOD TEST: CPT

## 2025-05-28 PROCEDURE — 93005 ELECTROCARDIOGRAM TRACING: CPT | Mod: TC

## 2025-05-28 PROCEDURE — 82728 ASSAY OF FERRITIN: CPT

## 2025-05-28 PROCEDURE — A9270 NON-COVERED ITEM OR SERVICE: HCPCS | Performed by: HOSPITALIST

## 2025-05-28 PROCEDURE — 99285 EMERGENCY DEPT VISIT HI MDM: CPT

## 2025-05-28 PROCEDURE — 94760 N-INVAS EAR/PLS OXIMETRY 1: CPT

## 2025-05-28 PROCEDURE — 700102 HCHG RX REV CODE 250 W/ 637 OVERRIDE(OP): Performed by: HOSPITALIST

## 2025-05-28 PROCEDURE — 83880 ASSAY OF NATRIURETIC PEPTIDE: CPT

## 2025-05-28 PROCEDURE — 83550 IRON BINDING TEST: CPT

## 2025-05-28 PROCEDURE — 93005 ELECTROCARDIOGRAM TRACING: CPT | Mod: TC | Performed by: EMERGENCY MEDICINE

## 2025-05-28 PROCEDURE — 83690 ASSAY OF LIPASE: CPT

## 2025-05-28 PROCEDURE — 83735 ASSAY OF MAGNESIUM: CPT

## 2025-05-28 PROCEDURE — 85046 RETICYTE/HGB CONCENTRATE: CPT

## 2025-05-28 PROCEDURE — 700105 HCHG RX REV CODE 258: Performed by: HOSPITALIST

## 2025-05-28 PROCEDURE — 74177 CT ABD & PELVIS W/CONTRAST: CPT

## 2025-05-28 PROCEDURE — 700105 HCHG RX REV CODE 258: Performed by: EMERGENCY MEDICINE

## 2025-05-28 PROCEDURE — 700117 HCHG RX CONTRAST REV CODE 255: Performed by: EMERGENCY MEDICINE

## 2025-05-28 PROCEDURE — 85025 COMPLETE CBC W/AUTO DIFF WBC: CPT

## 2025-05-28 PROCEDURE — 36415 COLL VENOUS BLD VENIPUNCTURE: CPT

## 2025-05-28 PROCEDURE — 770001 HCHG ROOM/CARE - MED/SURG/GYN PRIV*

## 2025-05-28 PROCEDURE — 99223 1ST HOSP IP/OBS HIGH 75: CPT | Mod: AI | Performed by: HOSPITALIST

## 2025-05-28 PROCEDURE — 96374 THER/PROPH/DIAG INJ IV PUSH: CPT

## 2025-05-28 PROCEDURE — 71045 X-RAY EXAM CHEST 1 VIEW: CPT

## 2025-05-28 PROCEDURE — 83540 ASSAY OF IRON: CPT

## 2025-05-28 PROCEDURE — 80053 COMPREHEN METABOLIC PANEL: CPT

## 2025-05-28 PROCEDURE — 700111 HCHG RX REV CODE 636 W/ 250 OVERRIDE (IP): Performed by: EMERGENCY MEDICINE

## 2025-05-28 PROCEDURE — 84484 ASSAY OF TROPONIN QUANT: CPT

## 2025-05-28 PROCEDURE — 700111 HCHG RX REV CODE 636 W/ 250 OVERRIDE (IP): Performed by: HOSPITALIST

## 2025-05-28 RX ORDER — HYDROMORPHONE HYDROCHLORIDE 1 MG/ML
0.5 INJECTION, SOLUTION INTRAMUSCULAR; INTRAVENOUS; SUBCUTANEOUS
Status: DISCONTINUED | OUTPATIENT
Start: 2025-05-28 | End: 2025-05-29

## 2025-05-28 RX ORDER — ONDANSETRON 2 MG/ML
4 INJECTION INTRAMUSCULAR; INTRAVENOUS ONCE
Status: COMPLETED | OUTPATIENT
Start: 2025-05-28 | End: 2025-05-28

## 2025-05-28 RX ORDER — OXYCODONE HYDROCHLORIDE 5 MG/1
5 TABLET ORAL
Refills: 0 | Status: DISCONTINUED | OUTPATIENT
Start: 2025-05-28 | End: 2025-05-29

## 2025-05-28 RX ORDER — DEXTROSE MONOHYDRATE 25 G/50ML
25 INJECTION, SOLUTION INTRAVENOUS
Status: DISCONTINUED | OUTPATIENT
Start: 2025-05-28 | End: 2025-06-01 | Stop reason: HOSPADM

## 2025-05-28 RX ORDER — LABETALOL HYDROCHLORIDE 5 MG/ML
10 INJECTION, SOLUTION INTRAVENOUS EVERY 4 HOURS PRN
Status: DISCONTINUED | OUTPATIENT
Start: 2025-05-28 | End: 2025-06-01 | Stop reason: HOSPADM

## 2025-05-28 RX ORDER — TIMOLOL MALEATE 5 MG/ML
1 SOLUTION/ DROPS OPHTHALMIC EVERY MORNING
Status: DISCONTINUED | OUTPATIENT
Start: 2025-05-29 | End: 2025-06-01 | Stop reason: HOSPADM

## 2025-05-28 RX ORDER — HEPARIN SODIUM 5000 [USP'U]/ML
5000 INJECTION, SOLUTION INTRAVENOUS; SUBCUTANEOUS EVERY 8 HOURS
Status: DISCONTINUED | OUTPATIENT
Start: 2025-05-28 | End: 2025-06-01 | Stop reason: HOSPADM

## 2025-05-28 RX ORDER — ONDANSETRON 4 MG/1
4 TABLET, ORALLY DISINTEGRATING ORAL EVERY 4 HOURS PRN
Status: DISCONTINUED | OUTPATIENT
Start: 2025-05-28 | End: 2025-06-01 | Stop reason: HOSPADM

## 2025-05-28 RX ORDER — OXYCODONE HYDROCHLORIDE 10 MG/1
10 TABLET ORAL
Refills: 0 | Status: DISCONTINUED | OUTPATIENT
Start: 2025-05-28 | End: 2025-05-29

## 2025-05-28 RX ORDER — SODIUM CHLORIDE 9 MG/ML
INJECTION, SOLUTION INTRAVENOUS CONTINUOUS
Status: ACTIVE | OUTPATIENT
Start: 2025-05-28 | End: 2025-05-29

## 2025-05-28 RX ORDER — SODIUM CHLORIDE 9 MG/ML
1000 INJECTION, SOLUTION INTRAVENOUS ONCE
Status: COMPLETED | OUTPATIENT
Start: 2025-05-28 | End: 2025-05-28

## 2025-05-28 RX ORDER — INSULIN LISPRO 100 [IU]/ML
1-6 INJECTION, SOLUTION INTRAVENOUS; SUBCUTANEOUS
Status: DISCONTINUED | OUTPATIENT
Start: 2025-05-28 | End: 2025-06-01 | Stop reason: HOSPADM

## 2025-05-28 RX ORDER — POTASSIUM CHLORIDE 1500 MG/1
20 TABLET, EXTENDED RELEASE ORAL 2 TIMES DAILY
Status: DISCONTINUED | OUTPATIENT
Start: 2025-05-28 | End: 2025-05-31

## 2025-05-28 RX ORDER — ONDANSETRON 2 MG/ML
4 INJECTION INTRAMUSCULAR; INTRAVENOUS EVERY 4 HOURS PRN
Status: DISCONTINUED | OUTPATIENT
Start: 2025-05-28 | End: 2025-06-01 | Stop reason: HOSPADM

## 2025-05-28 RX ORDER — LATANOPROST 50 UG/ML
1 SOLUTION/ DROPS OPHTHALMIC
Status: DISCONTINUED | OUTPATIENT
Start: 2025-05-28 | End: 2025-06-01 | Stop reason: HOSPADM

## 2025-05-28 RX ORDER — POLYETHYLENE GLYCOL 3350 17 G/17G
1 POWDER, FOR SOLUTION ORAL
Status: DISCONTINUED | OUTPATIENT
Start: 2025-05-28 | End: 2025-06-01 | Stop reason: HOSPADM

## 2025-05-28 RX ORDER — ASPIRIN 81 MG/1
324 TABLET, CHEWABLE ORAL ONCE
Status: COMPLETED | OUTPATIENT
Start: 2025-05-28 | End: 2025-05-28

## 2025-05-28 RX ORDER — ACETAMINOPHEN 325 MG/1
650 TABLET ORAL EVERY 6 HOURS PRN
Status: DISCONTINUED | OUTPATIENT
Start: 2025-05-28 | End: 2025-06-01 | Stop reason: HOSPADM

## 2025-05-28 RX ORDER — AMOXICILLIN 250 MG
2 CAPSULE ORAL EVERY EVENING
Status: DISCONTINUED | OUTPATIENT
Start: 2025-05-28 | End: 2025-06-01 | Stop reason: HOSPADM

## 2025-05-28 RX ADMIN — HEPARIN SODIUM 5000 UNITS: 5000 INJECTION, SOLUTION INTRAVENOUS; SUBCUTANEOUS at 21:10

## 2025-05-28 RX ADMIN — SODIUM CHLORIDE 1000 ML: 9 INJECTION, SOLUTION INTRAVENOUS at 15:45

## 2025-05-28 RX ADMIN — ONDANSETRON 4 MG: 2 INJECTION INTRAMUSCULAR; INTRAVENOUS at 15:30

## 2025-05-28 RX ADMIN — SODIUM CHLORIDE: 9 INJECTION, SOLUTION INTRAVENOUS at 23:30

## 2025-05-28 RX ADMIN — SODIUM CHLORIDE: 9 INJECTION, SOLUTION INTRAVENOUS at 19:58

## 2025-05-28 RX ADMIN — LATANOPROST 1 DROP: 50 SOLUTION OPHTHALMIC at 21:10

## 2025-05-28 RX ADMIN — OXYCODONE 5 MG: 5 TABLET ORAL at 19:17

## 2025-05-28 RX ADMIN — ASPIRIN 324 MG: 81 TABLET, CHEWABLE ORAL at 13:56

## 2025-05-28 RX ADMIN — IOHEXOL 100 ML: 350 INJECTION, SOLUTION INTRAVENOUS at 18:30

## 2025-05-28 RX ADMIN — POTASSIUM CHLORIDE 20 MEQ: 1500 TABLET, EXTENDED RELEASE ORAL at 19:18

## 2025-05-28 SDOH — ECONOMIC STABILITY: TRANSPORTATION INSECURITY
IN THE PAST 12 MONTHS, HAS LACK OF RELIABLE TRANSPORTATION KEPT YOU FROM MEDICAL APPOINTMENTS, MEETINGS, WORK OR FROM GETTING THINGS NEEDED FOR DAILY LIVING?: NO

## 2025-05-28 ASSESSMENT — COGNITIVE AND FUNCTIONAL STATUS - GENERAL
MOBILITY SCORE: 24
SUGGESTED CMS G CODE MODIFIER MOBILITY: CH
DAILY ACTIVITIY SCORE: 24
SUGGESTED CMS G CODE MODIFIER DAILY ACTIVITY: CH

## 2025-05-28 ASSESSMENT — ENCOUNTER SYMPTOMS
RESPIRATORY NEGATIVE: 1
MUSCULOSKELETAL NEGATIVE: 1
HEADACHES: 0
ABDOMINAL PAIN: 0
BRUISES/BLEEDS EASILY: 0
NERVOUS/ANXIOUS: 0
DIAPHORESIS: 0
HEARTBURN: 0
NUMBER OF EPISODES OF EMESIS TODAY: 1
EYES NEGATIVE: 1
FEVER: 0
MYALGIAS: 0
LOSS OF CONSCIOUSNESS: 0
PALPITATIONS: 0
FOCAL WEAKNESS: 0
CHANGE IN BOWEL HABIT: 0
COUGH: 0
BLOOD IN STOOL: 0
NEUROLOGICAL NEGATIVE: 1
CARDIOVASCULAR NEGATIVE: 1
SPUTUM PRODUCTION: 0
DOUBLE VISION: 0
NAUSEA: 1
ANOREXIA: 1
CHILLS: 0
CONSTIPATION: 0
DIZZINESS: 0
NAUSEA: 1
BLOOD IN STOOL: 0
DIARRHEA: 0
FATIGUE: 1
SORE THROAT: 0
PALPITATIONS: 0
VOMITING: 1
ABDOMINAL PAIN: 1
VOMITING: 1
FEVER: 0
DIARRHEA: 0
CHILLS: 1
COUGH: 0
PSYCHIATRIC NEGATIVE: 1
HEMOPTYSIS: 0
WHEEZING: 0
SHORTNESS OF BREATH: 0
SEIZURES: 0
HEADACHES: 0
WHEEZING: 0

## 2025-05-28 ASSESSMENT — LIFESTYLE VARIABLES
AVERAGE NUMBER OF DAYS PER WEEK YOU HAVE A DRINK CONTAINING ALCOHOL: 3
CONSUMPTION TOTAL: NEGATIVE
ALCOHOL_USE: YES
TOTAL SCORE: 0
EVER FELT BAD OR GUILTY ABOUT YOUR DRINKING: NO
HAVE YOU EVER FELT YOU SHOULD CUT DOWN ON YOUR DRINKING: NO
HOW MANY TIMES IN THE PAST YEAR HAVE YOU HAD 5 OR MORE DRINKS IN A DAY: 0
TOTAL SCORE: 0
ON A TYPICAL DAY WHEN YOU DRINK ALCOHOL HOW MANY DRINKS DO YOU HAVE: 1
EVER HAD A DRINK FIRST THING IN THE MORNING TO STEADY YOUR NERVES TO GET RID OF A HANGOVER: NO
DOES PATIENT WANT TO STOP DRINKING: NO
HAVE PEOPLE ANNOYED YOU BY CRITICIZING YOUR DRINKING: NO
TOTAL SCORE: 0

## 2025-05-28 ASSESSMENT — FIBROSIS 4 INDEX
FIB4 SCORE: 2.46
FIB4 SCORE: 0.76
FIB4 SCORE: 0.76

## 2025-05-28 ASSESSMENT — SOCIAL DETERMINANTS OF HEALTH (SDOH)

## 2025-05-28 ASSESSMENT — PATIENT HEALTH QUESTIONNAIRE - PHQ9
1. LITTLE INTEREST OR PLEASURE IN DOING THINGS: NOT AT ALL
2. FEELING DOWN, DEPRESSED, IRRITABLE, OR HOPELESS: NOT AT ALL
SUM OF ALL RESPONSES TO PHQ9 QUESTIONS 1 AND 2: 0

## 2025-05-28 ASSESSMENT — PAIN DESCRIPTION - PAIN TYPE
TYPE: ACUTE PAIN
TYPE: ACUTE PAIN

## 2025-05-28 NOTE — ED NOTES
Med rec is complete per Patient at bedside.     Reconcile outside Information was available?Y  Allergies reviewed.    Has patient had any outside antibiotics in the last 30 days? N    Any Anticoagulants (rivaroxaban, apixaban, edoxaban, dabigatran, warfarin, enoxaparin) taken in the last 14 days? N       Pharmacy/Pharmacies Pt utilizes : [facility] [phone]

## 2025-05-28 NOTE — ED PROVIDER NOTES
ED Provider Note    CHIEF COMPLAINT  Chief Complaint   Patient presents with    Chest Pain     X1 week    N/V     X 1 week       EXTERNAL RECORDS REVIEWED  Urgent care note today sending the patient here for nausea vomiting and flipped T waves anteriorly on the EKG.  PCP note on April 15 for hyperlipidemia, gallbladder stone    HPI/ROS  LIMITATION TO HISTORY   Actively vomiting  OUTSIDE HISTORIAN(S):      Jamal Dee is a 74 y.o. male who presents to the ED with vomiting.  The patient is vomited about 3 times per day over the last 8 days.  He states he can drink water, typically starts after he eats approximately 30 minutes later.  He is having some abdominal pains.  The patient denies any actual chest pains.  He has never had his heart evaluated or an EKG in the past.  He just continues to vomit.  Was seen in urgent care and sent here.  He has never had his heart evaluated.    Further evaluation, the patient is not having any abdominal pain, when he eats something solid it feels like it is gets stuck in the central chest area.  Has never seen a GI doctor, only happens with solids.    PAST MEDICAL HISTORY   has a past medical history of Glaucoma and Hyperlipidemia.    SURGICAL HISTORY   has a past surgical history that includes cataract phaco with iol (5/2/2013) and cataract phaco with iol (5/16/2013).    FAMILY HISTORY  Family History   Problem Relation Age of Onset    Heart Attack Father     No Known Problems Mother     Cancer Sister 62        colon cancer    No Known Problems Maternal Grandmother     No Known Problems Maternal Grandfather     No Known Problems Paternal Grandmother     No Known Problems Paternal Grandfather     No Known Problems Brother     No Known Problems Brother     No Known Problems Brother     No Known Problems Brother     Sleep Apnea Neg Hx        SOCIAL HISTORY  Social History     Tobacco Use    Smoking status: Former     Current packs/day: 0.00     Average packs/day: 1.5  "packs/day for 15.0 years (22.5 ttl pk-yrs)     Types: Cigarettes     Start date: 1969     Quit date: 1984     Years since quittin.3    Smokeless tobacco: Never    Tobacco comments:     quit    Vaping Use    Vaping status: Never Used   Substance and Sexual Activity    Alcohol use: Yes     Alcohol/week: 4.2 oz     Types: 7 Glasses of wine per week     Comment: 1 glass a night    Drug use: No    Sexual activity: Not Currently     Partners: Female       CURRENT MEDICATIONS  Home Medications       Reviewed by Jeimy Hitchcock M.D. (Physician) on 25 at 1820  Med List Status: Complete     Medication Last Dose Status   atorvastatin (LIPITOR) 40 MG Tab 2025 Active   bimatoprost (LUMIGAN) 0.01 % Solution 2025 Active   dutasteride (AVODART) 0.5 MG capsule 2025 Active   famciclovir (FAMVIR) 125 MG Tab 2025 Active   metFORMIN ER (GLUCOPHAGE XR) 500 MG TABLET SR 24 HR 2025 Active   Omega-3 Fatty Acids (FISH OIL) 1000 MG Cap capsule 2025 Active   tamsulosin (FLOMAX) 0.4 MG capsule 2025 Active   timolol (TIMOPTIC) 0.5 % Solution 2025 Active   triamcinolone acetonide (KENALOG) 0.1 % Ointment Not Taking Active                  Audit from Redirected Encounters    **Home medications have not yet been reviewed for this encounter**         ALLERGIES  Allergies[1]    PHYSICAL EXAM  VITAL SIGNS: /59   Pulse 61   Temp 36.7 °C (98 °F) (Temporal)   Resp 16   Ht 1.702 m (5' 7\")   Wt 70.3 kg (155 lb)   SpO2 95%   BMI 24.28 kg/m²    Well-developed well-nourished 74-year-old male who appears actively vomiting in moderate distress  Atraumatic, normocephalic, oropharynx with dry mucous membranes  Neck is supple  Chest clear to auscultation  Regular rhythm  Mild epigastric abdominal tenderness palpation  Neuro awake alert speech is clear    EKG/LABS  Results for orders placed or performed during the hospital encounter of 25   CBC WITH DIFFERENTIAL    Collection Time: " 05/28/25  2:50 PM   Result Value Ref Range    WBC 11.1 (H) 4.8 - 10.8 K/uL    RBC 4.15 (L) 4.70 - 6.10 M/uL    Hemoglobin 12.3 (L) 14.0 - 18.0 g/dL    Hematocrit 38.5 (L) 42.0 - 52.0 %    MCV 92.8 81.4 - 97.8 fL    MCH 29.6 27.0 - 33.0 pg    MCHC 31.9 (L) 32.3 - 36.5 g/dL    RDW 50.1 (H) 35.9 - 50.0 fL    Platelet Count 303 164 - 446 K/uL    MPV 9.6 9.0 - 12.9 fL    Neutrophils-Polys 90.70 (H) 44.00 - 72.00 %    Lymphocytes 5.00 (L) 22.00 - 41.00 %    Monocytes 3.40 0.00 - 13.40 %    Eosinophils 0.30 0.00 - 6.90 %    Basophils 0.10 0.00 - 1.80 %    Immature Granulocytes 0.50 0.00 - 0.90 %    Nucleated RBC 0.00 0.00 - 0.20 /100 WBC    Neutrophils (Absolute) 10.07 (H) 1.82 - 7.42 K/uL    Lymphs (Absolute) 0.56 (L) 1.00 - 4.80 K/uL    Monos (Absolute) 0.38 0.00 - 0.85 K/uL    Eos (Absolute) 0.03 0.00 - 0.51 K/uL    Baso (Absolute) 0.01 0.00 - 0.12 K/uL    Immature Granulocytes (abs) 0.06 0.00 - 0.11 K/uL    NRBC (Absolute) 0.00 K/uL   COMP METABOLIC PANEL    Collection Time: 05/28/25  2:50 PM   Result Value Ref Range    Sodium 141 135 - 145 mmol/L    Potassium 3.5 (L) 3.6 - 5.5 mmol/L    Chloride 103 96 - 112 mmol/L    Co2 26 20 - 33 mmol/L    Anion Gap 12.0 7.0 - 16.0    Glucose 94 65 - 99 mg/dL    Bun 12 8 - 22 mg/dL    Creatinine 0.74 0.50 - 1.40 mg/dL    Calcium 9.3 8.5 - 10.5 mg/dL    Correct Calcium 9.8 8.5 - 10.5 mg/dL    AST(SGOT) 138 (H) 12 - 45 U/L    ALT(SGPT) 193 (H) 2 - 50 U/L    Alkaline Phosphatase 856 (H) 30 - 99 U/L    Total Bilirubin 2.6 (H) 0.1 - 1.5 mg/dL    Albumin 3.4 3.2 - 4.9 g/dL    Total Protein 7.1 6.0 - 8.2 g/dL    Globulin 3.7 (H) 1.9 - 3.5 g/dL    A-G Ratio 0.9 g/dL   LIPASE    Collection Time: 05/28/25  2:50 PM   Result Value Ref Range    Lipase >3000 (H) 11 - 82 U/L   TROPONIN    Collection Time: 05/28/25  2:50 PM   Result Value Ref Range    Troponin T 11 6 - 19 ng/L   proBrain Natriuretic Peptide, NT    Collection Time: 05/28/25  2:50 PM   Result Value Ref Range    NT-proBNP 180 (H) 0 -  125 pg/mL   ESTIMATED GFR    Collection Time: 25  2:50 PM   Result Value Ref Range    GFR (CKD-EPI) 94 >60 mL/min/1.73 m 2   Magnesium    Collection Time: 25  2:50 PM   Result Value Ref Range    Magnesium 2.4 1.5 - 2.5 mg/dL   RETICULOCYTES COUNT    Collection Time: 25  2:50 PM   Result Value Ref Range    Reticulocyte Count 1.6 0.8 - 2.6 %    Retic, Absolute 0.07 0.04 - 0.12 M/uL    Imm. Reticulocyte Fraction 14.1 2.6 - 16.1 %    Retic Hgb Equivalent 29.9 29.0 - 35.0 pg/cell   IRON/TOTAL IRON BIND    Collection Time: 25  2:50 PM   Result Value Ref Range    Iron 27 (L) 50 - 180 ug/dL    Total Iron Binding 259 250 - 450 ug/dL    Unsat Iron Binding 232 110 - 370 ug/dL    % Saturation 10 (L) 15 - 55 %   EKG    Collection Time: 25  3:04 PM   Result Value Ref Range    Report       [facility] Emergency Dept.    Test Date:  2025  Pt Name:    AYO HICKS                Department: [facility]  MRN:        [id number]                      Room:       -ROOM 7  Gender:     Male                         Technician: [id number]  :        [id number]                   Requested By:ER TRIAGE PROTOCOL  Order #:    [id number]                    Reading MD: ALEXA FOX MD    Measurements  Intervals                                Axis  Rate:       50                           P:          44  NY:         170                          QRS:        55  QRSD:       106                          T:          39  QT:         482  QTc:        440    Interpretive Statements  Sinus rhythm  Abnormal T, consider ischemia, anterior leads  No previous ECG available for comparison  Electronically Signed On 2025 15:04:59 PDT by ALEXA FOX MD        I have independently interpreted this EKG    RADIOLOGY/PROCEDURES   I have independently interpreted the diagnostic imaging associated with this visit and am waiting the final reading from the radiologist.   My preliminary interpretation is  as follows: No pneumonia    Radiologist interpretation:  CT-ABDOMEN-PELVIS WITH   Final Result      1.  Cholelithiasis.      2.  7 mm calcified stone appears to be present in the distal common bile duct. There is mild to moderate intrahepatic biliary ductal dilatation. There is also dilatation of the common bile duct proximal to the stone.      3.  No other acute abnormalities are identified in the abdomen or pelvis.         DX-CHEST-PORTABLE (1 VIEW)   Final Result      No evidence of acute cardiopulmonary process.      [id number]    (Results Pending)       COURSE & MEDICAL DECISION MAKING    ASSESSMENT, COURSE AND PLAN  Care Narrative: Patient is coming in with vomiting, the patient does have flipped T waves in V1 through V3.  After reinterviewing the patient after he stopped vomiting it sounds like things are getting stuck in his esophagus and that this is a food impaction.  Will give the patient some Zofran, check basic laboratory test.    Patient's lipase came back very elevated therefore I got a CT scan that shows likely choledocholithiasis with gallstone pancreatitis.  The patient will need to be admitted to the hospital, discussed case with Dr. Hitchcock for hospitalization.    DISPOSITION AND DISCUSSIONS  I have discussed management of the patient with the following physicians and JONATHON's: Dr. Hitchcock for hospitalization    FINAL DIAGNOSIS  1. Acute gallstone pancreatitis         Electronically signed by: Costa Philip M.D., 5/28/2025 3:15 PM           [1]   Allergies  Allergen Reactions    Other Environmental Unspecified     Goose down

## 2025-05-28 NOTE — PROGRESS NOTES
"Subjective     Jamal Dee is a 74 y.o. male who presents with Emesis (For about a week now has been having vomiting, loss of appetite, can't hold any food down, chills, chest pain, heart burn )            Emesis  This is a recurrent problem. Episode onset: 9 days. The problem occurs constantly. The problem has been gradually worsening. Associated symptoms include anorexia, chest pain (\"chest discomfort\"), chills, fatigue, nausea and vomiting. Pertinent negatives include no abdominal pain, change in bowel habit, congestion, coughing, fever, headaches, myalgias, rash, sore throat or urinary symptoms. The symptoms are aggravated by eating. He has tried nothing for the symptoms.   Patient reports active discomfort in the substernal region. He is able to tolerate fluids but no food. He states he immediately has chest discomfort and gas and then vomits. He has vomited two times in the office today.     Past Medical History[1]    Past Surgical History[2]    Family History   Problem Relation Age of Onset    Heart Attack Father     No Known Problems Mother     Cancer Sister 62        colon cancer    No Known Problems Maternal Grandmother     No Known Problems Maternal Grandfather     No Known Problems Paternal Grandmother     No Known Problems Paternal Grandfather     No Known Problems Brother     No Known Problems Brother     No Known Problems Brother     No Known Problems Brother     Sleep Apnea Neg Hx        Allergies:  Other environmental    Medications, Allergies, and current problem list reviewed today in Epic    Review of Systems   Constitutional:  Positive for chills, fatigue and malaise/fatigue. Negative for fever.   HENT:  Negative for congestion and sore throat.    Respiratory:  Negative for cough, sputum production, shortness of breath and wheezing.    Cardiovascular:  Positive for chest pain (\"chest discomfort\"). Negative for palpitations and leg swelling.   Gastrointestinal:  Positive for anorexia, " "nausea and vomiting. Negative for abdominal pain, blood in stool, change in bowel habit, diarrhea and melena.   Musculoskeletal:  Negative for myalgias.   Skin:  Negative for rash.   Neurological:  Negative for headaches.     All other systems reviewed and are negative.            Objective     /72   Pulse 65   Temp (!) 35.3 °C (95.6 °F) (Temporal)   Resp 16   Ht 1.753 m (5' 9\")   Wt 72.3 kg (159 lb 6.4 oz)   SpO2 96%   BMI 23.54 kg/m²      Physical Exam  Constitutional:       General: He is not in acute distress.     Appearance: He is ill-appearing. He is not diaphoretic.   HENT:      Head: Normocephalic and atraumatic.   Eyes:      General: No scleral icterus.     Conjunctiva/sclera: Conjunctivae normal.   Cardiovascular:      Rate and Rhythm: Normal rate and regular rhythm.      Heart sounds: Normal heart sounds.   Pulmonary:      Effort: Pulmonary effort is normal. No respiratory distress.      Breath sounds: No stridor. No wheezing.   Abdominal:      General: There is no distension.      Palpations: Abdomen is soft. There is no mass.      Tenderness: There is no abdominal tenderness. There is no guarding or rebound.   Skin:     General: Skin is warm and dry.   Neurological:      General: No focal deficit present.      Mental Status: He is alert and oriented to person, place, and time.   Psychiatric:         Mood and Affect: Mood normal.         Behavior: Behavior normal.         Thought Content: Thought content normal.         Judgment: Judgment normal.                         EKG: Sinus rhythm. Inverted T was in leads V1, V2, and V3 suggestive of ischemia. No previous ECGs to compare.          Assessment & Plan  Nausea and vomiting, unspecified vomiting type         Chest discomfort  The ASCVD Risk score ([place] DK, et al., 2019) failed to calculate for the following reasons:    The valid total cholesterol range is 130 to 320 mg/dL      Orders:    aspirin (Asa) chewable tab 324 mg    ECG " abnormal       Patient given 4 baby aspirin in clinic.       At this time, I feel the patient requires a higher level of care including closer monitoring, stat lab work and/or imaging for further evaluation for complaints of persistent vomiting, chest pain, and ischemic changes found on EKG. .This has been discussed with the patient and they state agreement and understanding.  [facility] was called to transport patient safely to [facility].     Transfer center notified.     Chandrika Valenzuela P.A.-C.           [1]   Past Medical History:  Diagnosis Date    Glaucoma     Hyperlipidemia    [2]   Past Surgical History:  Procedure Laterality Date    CATARACT PHACO WITH IOL  5/16/2013    Performed by Theodore Guzman M.D. at [facility]S    CATARACT PHACO WITH IOL  5/2/2013    Performed by Theodore Guzman M.D. at [facility]

## 2025-05-28 NOTE — ED TRIAGE NOTES
"Chief Complaint   Patient presents with    Chest Pain     X1 week    N/V     X 1 week     See at  for CP n/v x1 week,  sent here for abnormal EKG.   UC administered 324 ASA  [facility] administered 4 Zofran     /66   Pulse (!) 54   Temp 36.7 °C (98 °F) (Temporal)   Resp (!) 22   Ht 1.702 m (5' 7\")   Wt 70.3 kg (155 lb)   SpO2 95%   BMI 24.28 kg/m²     "

## 2025-05-29 ENCOUNTER — APPOINTMENT (OUTPATIENT)
Dept: RADIOLOGY | Facility: MEDICAL CENTER | Age: 75
DRG: 417 | End: 2025-05-29
Attending: HOSPITALIST
Payer: MEDICARE

## 2025-05-29 LAB
ALBUMIN SERPL BCP-MCNC: 2.9 G/DL (ref 3.2–4.9)
ALBUMIN/GLOB SERPL: 0.9 G/DL
ALP SERPL-CCNC: 751 U/L (ref 30–99)
ALT SERPL-CCNC: 151 U/L (ref 2–50)
ANION GAP SERPL CALC-SCNC: 14 MMOL/L (ref 7–16)
AST SERPL-CCNC: 92 U/L (ref 12–45)
BILIRUB SERPL-MCNC: 1.4 MG/DL (ref 0.1–1.5)
BUN SERPL-MCNC: 10 MG/DL (ref 8–22)
CALCIUM ALBUM COR SERPL-MCNC: 9.2 MG/DL (ref 8.5–10.5)
CALCIUM SERPL-MCNC: 8.3 MG/DL (ref 8.5–10.5)
CHLORIDE SERPL-SCNC: 106 MMOL/L (ref 96–112)
CO2 SERPL-SCNC: 20 MMOL/L (ref 20–33)
CREAT SERPL-MCNC: 0.62 MG/DL (ref 0.5–1.4)
ERYTHROCYTE [DISTWIDTH] IN BLOOD BY AUTOMATED COUNT: 49.7 FL (ref 35.9–50)
FERRITIN SERPL-MCNC: 682 NG/ML (ref 22–322)
GFR SERPLBLD CREATININE-BSD FMLA CKD-EPI: 99 ML/MIN/1.73 M 2
GLOBULIN SER CALC-MCNC: 3.3 G/DL (ref 1.9–3.5)
GLUCOSE BLD STRIP.AUTO-MCNC: 105 MG/DL (ref 65–99)
GLUCOSE BLD STRIP.AUTO-MCNC: 119 MG/DL (ref 65–99)
GLUCOSE BLD STRIP.AUTO-MCNC: 69 MG/DL (ref 65–99)
GLUCOSE BLD STRIP.AUTO-MCNC: 87 MG/DL (ref 65–99)
GLUCOSE BLD STRIP.AUTO-MCNC: 89 MG/DL (ref 65–99)
GLUCOSE BLD STRIP.AUTO-MCNC: 99 MG/DL (ref 65–99)
GLUCOSE SERPL-MCNC: 75 MG/DL (ref 65–99)
HCT VFR BLD AUTO: 38.6 % (ref 42–52)
HGB BLD-MCNC: 12.3 G/DL (ref 14–18)
MCH RBC QN AUTO: 29.9 PG (ref 27–33)
MCHC RBC AUTO-ENTMCNC: 31.9 G/DL (ref 32.3–36.5)
MCV RBC AUTO: 93.9 FL (ref 81.4–97.8)
PLATELET # BLD AUTO: 297 K/UL (ref 164–446)
PMV BLD AUTO: 10 FL (ref 9–12.9)
POTASSIUM SERPL-SCNC: 3.4 MMOL/L (ref 3.6–5.5)
PROT SERPL-MCNC: 6.2 G/DL (ref 6–8.2)
RBC # BLD AUTO: 4.11 M/UL (ref 4.7–6.1)
SODIUM SERPL-SCNC: 140 MMOL/L (ref 135–145)
WBC # BLD AUTO: 10 K/UL (ref 4.8–10.8)

## 2025-05-29 PROCEDURE — A9270 NON-COVERED ITEM OR SERVICE: HCPCS | Performed by: INTERNAL MEDICINE

## 2025-05-29 PROCEDURE — 99233 SBSQ HOSP IP/OBS HIGH 50: CPT | Performed by: INTERNAL MEDICINE

## 2025-05-29 PROCEDURE — 80053 COMPREHEN METABOLIC PANEL: CPT

## 2025-05-29 PROCEDURE — 74181 MRI ABDOMEN W/O CONTRAST: CPT

## 2025-05-29 PROCEDURE — 700111 HCHG RX REV CODE 636 W/ 250 OVERRIDE (IP): Mod: JZ | Performed by: HOSPITALIST

## 2025-05-29 PROCEDURE — 700102 HCHG RX REV CODE 250 W/ 637 OVERRIDE(OP): Performed by: INTERNAL MEDICINE

## 2025-05-29 PROCEDURE — A9270 NON-COVERED ITEM OR SERVICE: HCPCS | Performed by: HOSPITALIST

## 2025-05-29 PROCEDURE — 94760 N-INVAS EAR/PLS OXIMETRY 1: CPT

## 2025-05-29 PROCEDURE — 700111 HCHG RX REV CODE 636 W/ 250 OVERRIDE (IP)

## 2025-05-29 PROCEDURE — 700105 HCHG RX REV CODE 258: Performed by: HOSPITALIST

## 2025-05-29 PROCEDURE — 700101 HCHG RX REV CODE 250: Performed by: HOSPITALIST

## 2025-05-29 PROCEDURE — 82962 GLUCOSE BLOOD TEST: CPT | Mod: 91

## 2025-05-29 PROCEDURE — 700102 HCHG RX REV CODE 250 W/ 637 OVERRIDE(OP): Performed by: HOSPITALIST

## 2025-05-29 PROCEDURE — 85027 COMPLETE CBC AUTOMATED: CPT

## 2025-05-29 PROCEDURE — 36415 COLL VENOUS BLD VENIPUNCTURE: CPT

## 2025-05-29 PROCEDURE — 770001 HCHG ROOM/CARE - MED/SURG/GYN PRIV*

## 2025-05-29 RX ORDER — PANTOPRAZOLE SODIUM 40 MG/10ML
40 INJECTION, POWDER, LYOPHILIZED, FOR SOLUTION INTRAVENOUS 2 TIMES DAILY
Status: DISCONTINUED | OUTPATIENT
Start: 2025-05-29 | End: 2025-05-30

## 2025-05-29 RX ORDER — HYDROMORPHONE HYDROCHLORIDE 1 MG/ML
0.5 INJECTION, SOLUTION INTRAMUSCULAR; INTRAVENOUS; SUBCUTANEOUS
Status: DISCONTINUED | OUTPATIENT
Start: 2025-05-29 | End: 2025-06-01 | Stop reason: HOSPADM

## 2025-05-29 RX ORDER — OXYCODONE HYDROCHLORIDE 5 MG/1
5 TABLET ORAL
Refills: 0 | Status: DISCONTINUED | OUTPATIENT
Start: 2025-05-29 | End: 2025-06-01 | Stop reason: HOSPADM

## 2025-05-29 RX ORDER — POTASSIUM CHLORIDE 1500 MG/1
40 TABLET, EXTENDED RELEASE ORAL ONCE
Status: COMPLETED | OUTPATIENT
Start: 2025-05-29 | End: 2025-05-29

## 2025-05-29 RX ORDER — OXYCODONE HYDROCHLORIDE 10 MG/1
10 TABLET ORAL
Refills: 0 | Status: DISCONTINUED | OUTPATIENT
Start: 2025-05-29 | End: 2025-06-01 | Stop reason: HOSPADM

## 2025-05-29 RX ADMIN — OXYCODONE HYDROCHLORIDE 10 MG: 10 TABLET ORAL at 19:34

## 2025-05-29 RX ADMIN — OXYCODONE HYDROCHLORIDE 10 MG: 10 TABLET ORAL at 15:10

## 2025-05-29 RX ADMIN — OXYCODONE HYDROCHLORIDE 10 MG: 10 TABLET ORAL at 01:41

## 2025-05-29 RX ADMIN — POTASSIUM CHLORIDE 40 MEQ: 1500 TABLET, EXTENDED RELEASE ORAL at 09:50

## 2025-05-29 RX ADMIN — SODIUM CHLORIDE: 9 INJECTION, SOLUTION INTRAVENOUS at 05:31

## 2025-05-29 RX ADMIN — POTASSIUM CHLORIDE 20 MEQ: 1500 TABLET, EXTENDED RELEASE ORAL at 05:34

## 2025-05-29 RX ADMIN — HEPARIN SODIUM 5000 UNITS: 5000 INJECTION, SOLUTION INTRAVENOUS; SUBCUTANEOUS at 15:10

## 2025-05-29 RX ADMIN — SODIUM CHLORIDE: 9 INJECTION, SOLUTION INTRAVENOUS at 09:44

## 2025-05-29 RX ADMIN — PANTOPRAZOLE SODIUM 40 MG: 40 INJECTION, POWDER, FOR SOLUTION INTRAVENOUS at 01:54

## 2025-05-29 RX ADMIN — OXYCODONE HYDROCHLORIDE 10 MG: 10 TABLET ORAL at 23:13

## 2025-05-29 RX ADMIN — POTASSIUM CHLORIDE 20 MEQ: 1500 TABLET, EXTENDED RELEASE ORAL at 17:23

## 2025-05-29 RX ADMIN — HEPARIN SODIUM 5000 UNITS: 5000 INJECTION, SOLUTION INTRAVENOUS; SUBCUTANEOUS at 05:34

## 2025-05-29 RX ADMIN — PANTOPRAZOLE SODIUM 40 MG: 40 INJECTION, POWDER, FOR SOLUTION INTRAVENOUS at 17:23

## 2025-05-29 RX ADMIN — HEPARIN SODIUM 5000 UNITS: 5000 INJECTION, SOLUTION INTRAVENOUS; SUBCUTANEOUS at 21:52

## 2025-05-29 RX ADMIN — SENNOSIDES AND DOCUSATE SODIUM 2 TABLET: 50; 8.6 TABLET ORAL at 17:23

## 2025-05-29 RX ADMIN — TIMOLOL MALEATE 1 DROP: 5 SOLUTION OPHTHALMIC at 05:34

## 2025-05-29 RX ADMIN — LATANOPROST 1 DROP: 50 SOLUTION OPHTHALMIC at 21:52

## 2025-05-29 RX ADMIN — OXYCODONE HYDROCHLORIDE 10 MG: 10 TABLET ORAL at 08:32

## 2025-05-29 RX ADMIN — SODIUM CHLORIDE: 9 INJECTION, SOLUTION INTRAVENOUS at 02:02

## 2025-05-29 RX ADMIN — HYDROMORPHONE HYDROCHLORIDE 0.5 MG: 1 INJECTION, SOLUTION INTRAMUSCULAR; INTRAVENOUS; SUBCUTANEOUS at 09:50

## 2025-05-29 ASSESSMENT — PAIN DESCRIPTION - PAIN TYPE
TYPE: ACUTE PAIN

## 2025-05-29 NOTE — ASSESSMENT & PLAN NOTE
Liver enzymes are elevated secondary to the common bile duct obstruction.  Bili 2.6 now down trending  Fluid resuscitation has been ordered  MRCP with distal CBD stone  I consulted LINDA Marie  LFTS up today bili up to 4.0  Dr. Stevens performed ERCP sp stone removal, pus drainage  LFTS improving

## 2025-05-29 NOTE — PROGRESS NOTES
Report received from Brynn MUÑIZ, assumed care of pt 0700.   POC and medications reviewed with pt. Pt verbalized understanding.   AOx4  C/o nothing at this time.  Denies pain, SOB, or dizziness at this time.   Safety measures in place.  Hourly rounding in place.

## 2025-05-29 NOTE — PROGRESS NOTES
Hypoglycemia Intervention    Hypoglycemia protocol intervention:  Blood glucose 69 at 0541.  Intervention: 8 oz of fruit juice   Repeat blood glucose 99 at 0617.  Intervention: 4 oz of fruit juice   Repeat blood glucose 119 at 0645  Dr. Crain notified of the above at 0652.

## 2025-05-29 NOTE — CARE PLAN
The patient is Stable - Low risk of patient condition declining or worsening    Shift Goals  Clinical Goals: Patient pain will be managed at a tolerable level of 3/10 or less throughout night, Patient will remain free from fall or injury. IV fluids  Patient Goals: Rest comfortably, pain management  Family Goals: n/a    Progress made toward(s) clinical / shift goals:  Patient's pain maintained at a tolerable level of 3/10 or less throughout shift with medication per MAR and rest. Pt did not sustain a fall or injury during shift. Able to rest comfortably throughout shift. Pt received IV fluids.     Patient is not progressing towards the following goals:

## 2025-05-29 NOTE — H&P
Hospital Medicine History & Physical Note    Date of Service  5/28/2025    Primary Care Physician  Rosina Agosto M.D.    Consultants  None    Specialist Names: None    Code Status  Full Code    Chief Complaint  Chief Complaint   Patient presents with    Chest Pain     X1 week    N/V     X 1 week       History of Presenting Illness  Jamal Dee is a 74 y.o. male who presented 5/28/2025 with nausea vomiting abdominal pain in the epigastric region for the past 10 days.  The patient reports today because of the symptoms and is found to have a lipase over 3000 and found to have a 7 mm stone in the common bile duct with common bile duct dilation.  The patient has acute gallstone pancreatitis.  MRCP has been ordered.  Fluid resuscitation has been ordered.  Pain management has been ordered.  Depending on MRI results GI will need to be consulted and an ERCP most likely will need to be done prior to removing his gallbladder by surgery..    I discussed the plan of care with patient, bedside RN, and emergency room physician Dr. Theodore Philip.    Review of Systems  Review of Systems   Constitutional:  Positive for malaise/fatigue. Negative for chills, diaphoresis and fever.        Loss   HENT: Negative.     Eyes: Negative.  Negative for double vision.   Respiratory: Negative.  Negative for cough, hemoptysis and wheezing.    Cardiovascular: Negative.  Negative for chest pain, palpitations and leg swelling.   Gastrointestinal:  Positive for abdominal pain, nausea and vomiting. Negative for blood in stool, constipation, diarrhea and heartburn.   Genitourinary: Negative.  Negative for frequency, hematuria and urgency.   Musculoskeletal: Negative.  Negative for joint pain.   Skin: Negative.  Negative for itching and rash.   Neurological: Negative.  Negative for dizziness, focal weakness, seizures, loss of consciousness and headaches.   Endo/Heme/Allergies: Negative.  Does not bruise/bleed easily.   Psychiatric/Behavioral:  Negative.  Negative for suicidal ideas. The patient is not nervous/anxious.    All other systems reviewed and are negative.      Past Medical History   has a past medical history of Glaucoma and Hyperlipidemia.    Surgical History   has a past surgical history that includes cataract phaco with iol (5/2/2013) and cataract phaco with iol (5/16/2013).     Family History  family history includes Cancer (age of onset: 62) in his sister; Heart Attack in his father; No Known Problems in his brother, brother, brother, brother, maternal grandfather, maternal grandmother, mother, paternal grandfather, and paternal grandmother.   Family history reviewed with patient. There is no family history that is pertinent to the chief complaint.     Social History   reports that he quit smoking about 41 years ago. His smoking use included cigarettes. He started smoking about 56 years ago. He has a 22.5 pack-year smoking history. He has never used smokeless tobacco. He reports current alcohol use of about 4.2 oz of alcohol per week. He reports that he does not use drugs.    Allergies  Allergies[1]    Medications  Prior to Admission Medications   Prescriptions Last Dose Informant Patient Reported? Taking?   Omega-3 Fatty Acids (FISH OIL) 1000 MG Cap capsule 5/27/2025 Noon Patient Yes Yes   Sig: Take 1,000 mg by mouth every day.   atorvastatin (LIPITOR) 40 MG Tab 5/27/2025 Bedtime Patient No Yes   Sig: TAKE ONE TABLET BY MOUTH AT BEDTIME   Patient taking differently: Take 40 mg by mouth at bedtime. TAKE ONE TABLET BY MOUTH AT BEDTIME   bimatoprost (LUMIGAN) 0.01 % Solution 5/27/2025 Bedtime Patient Yes Yes   Sig: Administer 1 Drop into both eyes at bedtime.   dutasteride (AVODART) 0.5 MG capsule 5/27/2025 Evening Patient No Yes   Sig: TAKE ONE CAPSULE BY MOUTH ONE TIME DAILY   Patient taking differently: Take 0.5 mg by mouth every day. TAKE ONE CAPSULE BY MOUTH ONE TIME DAILY   famciclovir (FAMVIR) 125 MG Tab 5/27/2025 Evening Patient No  Yes   Sig: Take 1 Tablet by mouth 3 times a day as needed (recurrent herpes outbreaks).   metFORMIN ER (GLUCOPHAGE XR) 500 MG TABLET SR 24 HR 5/28/2025 at 11:00 AM Patient No Yes   Sig: Take 1 Tablet by mouth 2 times a day.   tamsulosin (FLOMAX) 0.4 MG capsule 5/28/2025 at 11:00 AM Patient No Yes   Sig: Take 1 Capsule by mouth every day.   timolol (TIMOPTIC) 0.5 % Solution 5/28/2025 at 11:00 AM Patient Yes Yes   Sig: Administer 1 Drop into both eyes every morning.   triamcinolone acetonide (KENALOG) 0.1 % Ointment Not Taking Patient No No   Sig: Apply thin layer onto affected twice daily as needed for symptoms.   Patient not taking: Reported on 5/28/2025      Facility-Administered Medications: None       Physical Exam  Temp:  [35.3 °C (95.6 °F)-36.7 °C (98 °F)] 36.7 °C (98 °F)  Pulse:  [52-71] 52  Resp:  [14-26] 21  BP: (108-152)/(64-74) 108/64  SpO2:  [85 %-98 %] 96 %  Blood Pressure : 108/64   Temperature: 36.7 °C (98 °F)   Pulse: (!) 52   Respiration: (!) 21   Pulse Oximetry: 96 %       Physical Exam  Vitals and nursing note reviewed. Exam conducted with a chaperone present.   Constitutional:       General: He is not in acute distress.     Appearance: Normal appearance. He is well-developed and normal weight. He is ill-appearing. He is not toxic-appearing or diaphoretic.   HENT:      Head: Normocephalic and atraumatic.      Right Ear: External ear normal.      Left Ear: External ear normal.      Nose: Nose normal.      Mouth/Throat:      Mouth: Mucous membranes are dry.      Pharynx: Oropharynx is clear.   Eyes:      Extraocular Movements: Extraocular movements intact.      Conjunctiva/sclera: Conjunctivae normal.      Pupils: Pupils are equal, round, and reactive to light.   Neck:      Thyroid: No thyromegaly.      Vascular: No carotid bruit or JVD.   Cardiovascular:      Rate and Rhythm: Normal rate and regular rhythm.      Pulses: Normal pulses.      Heart sounds: Normal heart sounds. No murmur heard.     No  friction rub.   Pulmonary:      Effort: Pulmonary effort is normal.      Breath sounds: Normal breath sounds.   Chest:      Chest wall: No tenderness.   Abdominal:      General: Abdomen is flat. Bowel sounds are normal. There is distension.      Palpations: Abdomen is soft. There is no mass.      Tenderness: There is abdominal tenderness in the epigastric area. There is no guarding or rebound.   Musculoskeletal:         General: Normal range of motion.      Cervical back: Normal range of motion and neck supple.      Right lower leg: No edema.      Left lower leg: No edema.   Lymphadenopathy:      Cervical: No cervical adenopathy.   Skin:     General: Skin is warm and dry.      Capillary Refill: Capillary refill takes more than 3 seconds.      Findings: No lesion or rash.   Neurological:      General: No focal deficit present.      Mental Status: He is alert and oriented to person, place, and time. Mental status is at baseline.      GCS: GCS eye subscore is 4. GCS verbal subscore is 5. GCS motor subscore is 6.      Cranial Nerves: No cranial nerve deficit.      Deep Tendon Reflexes: Reflexes are normal and symmetric.   Psychiatric:         Mood and Affect: Mood normal.         Behavior: Behavior normal.         Thought Content: Thought content normal.         Judgment: Judgment normal.         Laboratory:  Recent Labs     05/28/25  1450   WBC 11.1*   RBC 4.15*   HEMOGLOBIN 12.3*   HEMATOCRIT 38.5*   MCV 92.8   MCH 29.6   MCHC 31.9*   RDW 50.1*   PLATELETCT 303   MPV 9.6     Recent Labs     05/28/25  1450   SODIUM 141   POTASSIUM 3.5*   CHLORIDE 103   CO2 26   GLUCOSE 94   BUN 12   CREATININE 0.74   CALCIUM 9.3     Recent Labs     05/28/25  1450   ALTSGPT 193*   ASTSGOT 138*   ALKPHOSPHAT 856*   TBILIRUBIN 2.6*   LIPASE >3000*   GLUCOSE 94         Recent Labs     05/28/25  1450   NTPROBNP 180*         Recent Labs     05/28/25  1450   TROPONINT 11       Imaging:  CT-ABDOMEN-PELVIS WITH   Final Result      1.   Cholelithiasis.      2.  7 mm calcified stone appears to be present in the distal common bile duct. There is mild to moderate intrahepatic biliary ductal dilatation. There is also dilatation of the common bile duct proximal to the stone.      3.  No other acute abnormalities are identified in the abdomen or pelvis.         DX-CHEST-PORTABLE (1 VIEW)   Final Result      No evidence of acute cardiopulmonary process.      [id number]    (Results Pending)       X-Ray:  I have personally reviewed the images and compared with prior images.  EKG:  I have personally reviewed the images and compared with prior images.    Assessment/Plan:  Justification for Admission Status  I anticipate this patient will require at least two midnights for appropriate medical management, necessitating inpatient admission because patient has choledocholithiasis with acute pancreatitis secondary to gallstone.    Patient will need a Med/Surg bed on MEDICAL service .  The need is secondary to gallstone pancreatitis.    * Gallstone pancreatitis- (present on admission)  Assessment & Plan  Patient says for the past 10 days he has been having nausea vomiting and pale stools.  Patient comes in for evaluation is found to have gallstone pancreatitis.  Lipase is over 3000.  Imaging studies at this point reveal dilation of the common bile duct with a 7 mm stone.  I have ordered an MRCP  Depending on results patient may need GI consultation and an ERCP.  Afterwards the patient will need surgical consultation for gallbladder removal.  I have explained this to the patient.  For now I am putting the patient on normal saline at 300 mL/h.  I am starting the patient on pain management with Dilaudid 0.5 mg now and 0.5 mg every 3 hours as needed I am going to keep him on pulse oximeter monitoring while he is on narcotic management.    Elevated liver enzymes- (present on admission)  Assessment & Plan  Liver enzymes are elevated secondary to the common bile duct  obstruction.  MRCP has been ordered  Fluid resuscitation has been ordered  I will order pain management and he will most likely need GI consultation tomorrow.    Hypokalemia- (present on admission)  Assessment & Plan  Mild hypokalemia 3.5  Going to give him potassium supplementation 20 mill equivalents twice daily first dose now.    Leukocytosis- (present on admission)  Assessment & Plan  Leukocytosis secondary to the acute cholecystitis and pancreatitis.    Normochromic normocytic anemia- (present on admission)  Assessment & Plan  I am going to check an iron panel.    BPH (benign prostatic hypertrophy) with urinary retention- (present on admission)  Assessment & Plan  I am going to continue on Flomax and Avodart    Mixed hyperlipidemia- (present on admission)  Assessment & Plan  Patient usually takes Lipitor with the elevated liver functions am holding Lipitor  I will check a fasting lipid panel    DELMA on CPAP- (present on admission)  Assessment & Plan  I will continue CPAP nocturnally        VTE prophylaxis: SCDs/TEDs       [1]   Allergies  Allergen Reactions    Other Environmental Unspecified     Goose down

## 2025-05-29 NOTE — ASSESSMENT & PLAN NOTE
Nv pale stools for 10 days  Lipase 3000s  Likely 2/2 gallstone pancreatitis  Pancreatic duct is nl on MRCP  IVF 200cc/hr for today  Bladder scan as not as much UOP  Repeat lipase in AM  Repeat CMP in AM  IV dilaudid 0.5mg x 1 today and also oxy 10mg x 2  I consulted [facility] - sp ERCP  Lipase down trending

## 2025-05-29 NOTE — PROGRESS NOTES
4 Eyes Skin Assessment Completed by ANTONINO Fabian and ANTONINO Swain.    Skin assessment is primarily focused on high risk bony prominences. Pay special attention to skin beneath and around medical devices, high risk bony prominences, skin to skin areas and areas where the patient lacks sensation to feel pain and areas where the patient previously had breakdown.     Head (Occipital):  WDL   Ears (Under Medical Devices): WDL   Nose (Under Medical Devices): WDL   Mouth:  WDL   Neck: WDL   Breast/Chest:  WDL   Shoulder Blades:  WDL   Spine:   WDL   (R) Arm/Elbow/Hand: WDL   (L) Arm/Elbow/Hand: WDL   Abdomen: WDL   Pannus/Groin:  WDL   Sacrum/Coccyx:   WDL   (R) Ischial Tuberosity (Sit Bones):  WDL   (L) Ischial Tuberosity (Sit Bones):  WDL   (R) Leg:  WDL   (L) Leg:  WDL   (R) Heel:  WDL   (R) Foot/Toe: WDL   (L) Heel: WDL   (L) Foot/Toe:  WDL       DEVICES IN USE:   Respiratory Devices:  NA, patient on room air  Feeding Devices:  N/A   Lines & BP Monitoring Devices:  Peripheral IV, BP cuff, and Pulse ox    Orthopedic Devices:  N/A  Miscellaneous Devices:  N/A    PROTOCOL INTERVENTIONS:   Standard/Trauma Bed:  Already in place    WOUND PHOTOS:   N/A no wounds identified    WOUND CONSULT:   N/A, no advanced wound care needs identified

## 2025-05-29 NOTE — CARE PLAN
The patient is Stable - Low risk of patient condition declining or worsening    Shift Goals  Clinical Goals: Manage pain to tolerable 4/10 or less , remain free from falls throughout shift.  Patient Goals: Rest  Family Goals: n/a    Progress made toward(s) clinical / shift goals:  Pain managed to 4/10 or less post prn meds per MAR.    Patient is not progressing towards the following goals:

## 2025-05-29 NOTE — PROGRESS NOTES
Patient arrived to floor at 1952. Assumed patient care. Patient is A&O4, on RA, no SOB noted. No signs of distress or discomfort. Plan of care for the night discussed with patient. Patient verbalized understanding. Patient resting in bed comfortably. Safety precautions in place. All patient belongings and call light within reach. All needs addressed at this time. Patient will call with any needs.

## 2025-05-29 NOTE — ASSESSMENT & PLAN NOTE
I anticipate will need lap edita during this stay after ercp  I consulted DR. Finn [facility] Surgery sp lap edita 5/31

## 2025-05-29 NOTE — PROGRESS NOTES
[facility] Medicine Daily Progress Note    Date of Service  5/29/2025    Chief Complaint  Jamal Dee is a 74 y.o. male admitted 5/28/2025 with nv, LUQ pain    Hospital Course  73 yo with hx of gallstones presented with n/v, pale stool, LUQ pain found to have pancreatitis with lipase in 3000s and elev bili c/f gallstone pancreatitis and biliary obstruction. MRCP w CBD stone, GI consulted.    Interval Problem Update  - pt doing better  - IV dilaudid 0.5mg x 1 and oxy 10mg x 2  - mrcp with cbd stone  - I spoke with [facility] for consult  - I lowered IVFs  - tolerated CLD    I have discussed this patient's plan of care and discharge plan at IDT rounds today with Case Management, Nursing, Nursing leadership, and other members of the IDT team.    Consultants/Specialty  GI    Code Status  Full Code    Disposition  The patient is not medically cleared for discharge to home or a post-acute facility.  Anticipate discharge to: home with close outpatient follow-up    I have placed the appropriate orders for post-discharge needs.    Review of Systems  Review of Systems   All other systems reviewed and are negative.       Physical Exam  Temp:  [36.4 °C (97.6 °F)-36.7 °C (98.1 °F)] 36.7 °C (98.1 °F)  Pulse:  [52-88] 88  Resp:  [16-21] 18  BP: ()/(55-74) 91/55  SpO2:  [90 %-96 %] 92 %    Physical Exam  General: NAD, resting comfortably  HEENT: PERRLA, EOMI  Cards: RRR, n  Pulm: normal respiratory effort, CTAB, no wheezes or rhonchi  Abdomen: LUQ pain  MSK: normal ROM of upper and lower extremities  Neuro: CN II-XII grossly intact, sensation/strength intact, AAOx3  Psych: Appropriate mood   Fluids    Intake/Output Summary (Last 24 hours) at 5/29/2025 1628  Last data filed at 5/29/2025 0531  Gross per 24 hour   Intake 3055.87 ml   Output 300 ml   Net 2755.87 ml        Laboratory  Recent Labs     05/28/25  1450 05/29/25  0059   WBC 11.1* 10.0   RBC 4.15* 4.11*   HEMOGLOBIN 12.3* 12.3*   HEMATOCRIT 38.5* 38.6*   MCV 92.8  93.9   MCH 29.6 29.9   MCHC 31.9* 31.9*   RDW 50.1* 49.7   PLATELETCT 303 297   MPV 9.6 10.0     Recent Labs     05/28/25  1450 05/29/25  0059   SODIUM 141 140   POTASSIUM 3.5* 3.4*   CHLORIDE 103 106   CO2 26 20   GLUCOSE 94 75   BUN 12 10   CREATININE 0.74 0.62   CALCIUM 9.3 8.3*                   Imaging  [id number]   Final Result      1.  Choledocholithiasis with a 7 mm stone in the distal CBD.   2.  Dilated CBD and intrahepatic bile ducts.   3.  Cholelithiasis. Dilated gallbladder with mild pericholecystic fluid.      CT-ABDOMEN-PELVIS WITH   Final Result      1.  Cholelithiasis.      2.  7 mm calcified stone appears to be present in the distal common bile duct. There is mild to moderate intrahepatic biliary ductal dilatation. There is also dilatation of the common bile duct proximal to the stone.      3.  No other acute abnormalities are identified in the abdomen or pelvis.         DX-CHEST-PORTABLE (1 VIEW)   Final Result      No evidence of acute cardiopulmonary process.           Assessment/Plan  * Gallstone pancreatitis- (present on admission)  Assessment & Plan  Nv pale stools for 10 days  Lipase 3000s  Likely 2/2 gallstone pancreatitis  Pancreatic duct is nl on MRCP  IVF 200cc/hr for today  Bladder scan as not as much UOP  Repeat lipase in AM  Repeat CMP in AM  IV dilaudid 0.5mg x 1 today and also oxy 10mg x 2  I consulted [facility]    Elevated liver enzymes- (present on admission)  Assessment & Plan  Liver enzymes are elevated secondary to the common bile duct obstruction.  Bili 2.6 now down trending  Fluid resuscitation has been ordered  MRCP with distal CBD stone  I consulted LINDA Marie    Hypokalemia- (present on admission)  Assessment & Plan  Repleted  I will check cmp in am    Normochromic normocytic anemia- (present on admission)  Assessment & Plan  noted    Leukocytosis- (present on admission)  Assessment & Plan  Likely inflammatory  No obvious indication for abx    Cholelithiasis-  (present on admission)  Assessment & Plan  I anticipate will need lap edita during this stay after ercp    BPH (benign prostatic hypertrophy) with urinary retention- (present on admission)  Assessment & Plan  Cw home meds    Mixed hyperlipidemia- (present on admission)  Assessment & Plan  Cw statin    DELMA on CPAP- (present on admission)  Assessment & Plan  Cpap qhs         VTE prophylaxis:  [facility]    I have performed a physical exam and reviewed and updated ROS and Plan today (5/29/2025). In review of yesterday's note (5/28/2025), there are no changes except as documented above.    I spent 45 minutes providing care for this patient.  This included face-to-face interview, physical examination.  Review of lab work including CBC, BMP, MRI Discussion with multidisciplinary team including case management, nursing staff and pharmacy and GI

## 2025-05-30 ENCOUNTER — SURGERY (OUTPATIENT)
Age: 75
End: 2025-05-30
Payer: MEDICARE

## 2025-05-30 ENCOUNTER — APPOINTMENT (OUTPATIENT)
Dept: RADIOLOGY | Facility: MEDICAL CENTER | Age: 75
DRG: 417 | End: 2025-05-30
Attending: INTERNAL MEDICINE
Payer: MEDICARE

## 2025-05-30 ENCOUNTER — ANESTHESIA (OUTPATIENT)
Dept: SURGERY | Facility: MEDICAL CENTER | Age: 75
End: 2025-05-30
Payer: MEDICARE

## 2025-05-30 ENCOUNTER — ANESTHESIA EVENT (OUTPATIENT)
Dept: SURGERY | Facility: MEDICAL CENTER | Age: 75
End: 2025-05-30
Payer: MEDICARE

## 2025-05-30 PROBLEM — K83.09 CHOLANGITIS: Status: ACTIVE | Noted: 2025-05-30

## 2025-05-30 PROBLEM — K80.50 CHOLEDOCHOLITHIASIS: Status: ACTIVE | Noted: 2025-05-30

## 2025-05-30 LAB
ALBUMIN SERPL BCP-MCNC: 2.7 G/DL (ref 3.2–4.9)
ALBUMIN/GLOB SERPL: 0.8 G/DL
ALP SERPL-CCNC: 784 U/L (ref 30–99)
ALT SERPL-CCNC: 129 U/L (ref 2–50)
ANION GAP SERPL CALC-SCNC: 10 MMOL/L (ref 7–16)
AST SERPL-CCNC: 95 U/L (ref 12–45)
BILIRUB SERPL-MCNC: 4 MG/DL (ref 0.1–1.5)
BUN SERPL-MCNC: 7 MG/DL (ref 8–22)
CALCIUM ALBUM COR SERPL-MCNC: 9.1 MG/DL (ref 8.5–10.5)
CALCIUM SERPL-MCNC: 8.1 MG/DL (ref 8.5–10.5)
CHLORIDE SERPL-SCNC: 106 MMOL/L (ref 96–112)
CO2 SERPL-SCNC: 20 MMOL/L (ref 20–33)
CREAT SERPL-MCNC: 0.67 MG/DL (ref 0.5–1.4)
GFR SERPLBLD CREATININE-BSD FMLA CKD-EPI: 97 ML/MIN/1.73 M 2
GLOBULIN SER CALC-MCNC: 3.2 G/DL (ref 1.9–3.5)
GLUCOSE BLD STRIP.AUTO-MCNC: 124 MG/DL (ref 65–99)
GLUCOSE BLD STRIP.AUTO-MCNC: 131 MG/DL (ref 65–99)
GLUCOSE BLD STRIP.AUTO-MCNC: 80 MG/DL (ref 65–99)
GLUCOSE SERPL-MCNC: 83 MG/DL (ref 65–99)
LIPASE SERPL-CCNC: 2080 U/L (ref 11–82)
POTASSIUM SERPL-SCNC: 3.6 MMOL/L (ref 3.6–5.5)
PROT SERPL-MCNC: 5.9 G/DL (ref 6–8.2)
SODIUM SERPL-SCNC: 136 MMOL/L (ref 135–145)

## 2025-05-30 PROCEDURE — 160002 HCHG RECOVERY MINUTES (STAT): Performed by: INTERNAL MEDICINE

## 2025-05-30 PROCEDURE — 0FC98ZZ EXTIRPATION OF MATTER FROM COMMON BILE DUCT, VIA NATURAL OR ARTIFICIAL OPENING ENDOSCOPIC: ICD-10-PCS | Performed by: INTERNAL MEDICINE

## 2025-05-30 PROCEDURE — 700102 HCHG RX REV CODE 250 W/ 637 OVERRIDE(OP): Performed by: HOSPITALIST

## 2025-05-30 PROCEDURE — 700105 HCHG RX REV CODE 258: Performed by: INTERNAL MEDICINE

## 2025-05-30 PROCEDURE — 160048 HCHG OR STATISTICAL LEVEL 1-5: Performed by: INTERNAL MEDICINE

## 2025-05-30 PROCEDURE — 700105 HCHG RX REV CODE 258

## 2025-05-30 PROCEDURE — 160035 HCHG PACU - 1ST 60 MINS PHASE I: Performed by: INTERNAL MEDICINE

## 2025-05-30 PROCEDURE — 94760 N-INVAS EAR/PLS OXIMETRY 1: CPT

## 2025-05-30 PROCEDURE — 160015 HCHG STAT PREOP MINUTES: Performed by: INTERNAL MEDICINE

## 2025-05-30 PROCEDURE — 160009 HCHG ANES TIME/MIN: Performed by: INTERNAL MEDICINE

## 2025-05-30 PROCEDURE — 160208 HCHG ENDO MINUTES - EA ADDL 1 MIN LEVEL 4: Performed by: INTERNAL MEDICINE

## 2025-05-30 PROCEDURE — C1889 IMPLANT/INSERT DEVICE, NOC: HCPCS | Performed by: INTERNAL MEDICINE

## 2025-05-30 PROCEDURE — 700101 HCHG RX REV CODE 250: Performed by: ANESTHESIOLOGY

## 2025-05-30 PROCEDURE — 700111 HCHG RX REV CODE 636 W/ 250 OVERRIDE (IP)

## 2025-05-30 PROCEDURE — 83690 ASSAY OF LIPASE: CPT

## 2025-05-30 PROCEDURE — 160203 HCHG ENDO MINUTES - 1ST 30 MINS LEVEL 4: Performed by: INTERNAL MEDICINE

## 2025-05-30 PROCEDURE — 80053 COMPREHEN METABOLIC PANEL: CPT

## 2025-05-30 PROCEDURE — 74328 X-RAY BILE DUCT ENDOSCOPY: CPT

## 2025-05-30 PROCEDURE — 700102 HCHG RX REV CODE 250 W/ 637 OVERRIDE(OP): Performed by: INTERNAL MEDICINE

## 2025-05-30 PROCEDURE — 82962 GLUCOSE BLOOD TEST: CPT

## 2025-05-30 PROCEDURE — 700111 HCHG RX REV CODE 636 W/ 250 OVERRIDE (IP): Mod: JZ | Performed by: INTERNAL MEDICINE

## 2025-05-30 PROCEDURE — 700111 HCHG RX REV CODE 636 W/ 250 OVERRIDE (IP): Performed by: HOSPITALIST

## 2025-05-30 PROCEDURE — 770001 HCHG ROOM/CARE - MED/SURG/GYN PRIV*

## 2025-05-30 PROCEDURE — 99233 SBSQ HOSP IP/OBS HIGH 50: CPT | Performed by: INTERNAL MEDICINE

## 2025-05-30 PROCEDURE — 700111 HCHG RX REV CODE 636 W/ 250 OVERRIDE (IP): Performed by: ANESTHESIOLOGY

## 2025-05-30 PROCEDURE — A9270 NON-COVERED ITEM OR SERVICE: HCPCS | Performed by: HOSPITALIST

## 2025-05-30 PROCEDURE — 36415 COLL VENOUS BLD VENIPUNCTURE: CPT

## 2025-05-30 PROCEDURE — A9270 NON-COVERED ITEM OR SERVICE: HCPCS | Performed by: INTERNAL MEDICINE

## 2025-05-30 PROCEDURE — C1769 GUIDE WIRE: HCPCS | Performed by: INTERNAL MEDICINE

## 2025-05-30 RX ORDER — PHENYLEPHRINE HCL IN 0.9% NACL 1 MG/10 ML
SYRINGE (ML) INTRAVENOUS PRN
Status: DISCONTINUED | OUTPATIENT
Start: 2025-05-30 | End: 2025-05-30 | Stop reason: SURG

## 2025-05-30 RX ORDER — KETOROLAC TROMETHAMINE 15 MG/ML
INJECTION, SOLUTION INTRAMUSCULAR; INTRAVENOUS PRN
Status: DISCONTINUED | OUTPATIENT
Start: 2025-05-30 | End: 2025-05-30 | Stop reason: SURG

## 2025-05-30 RX ORDER — SODIUM CHLORIDE, SODIUM LACTATE, POTASSIUM CHLORIDE, CALCIUM CHLORIDE 600; 310; 30; 20 MG/100ML; MG/100ML; MG/100ML; MG/100ML
INJECTION, SOLUTION INTRAVENOUS CONTINUOUS
Status: DISPENSED | OUTPATIENT
Start: 2025-05-30 | End: 2025-05-30

## 2025-05-30 RX ORDER — DEXAMETHASONE SODIUM PHOSPHATE 4 MG/ML
INJECTION, SOLUTION INTRA-ARTICULAR; INTRALESIONAL; INTRAMUSCULAR; INTRAVENOUS; SOFT TISSUE PRN
Status: DISCONTINUED | OUTPATIENT
Start: 2025-05-30 | End: 2025-05-30 | Stop reason: SURG

## 2025-05-30 RX ORDER — LIDOCAINE HYDROCHLORIDE 20 MG/ML
INJECTION, SOLUTION EPIDURAL; INFILTRATION; INTRACAUDAL; PERINEURAL PRN
Status: DISCONTINUED | OUTPATIENT
Start: 2025-05-30 | End: 2025-05-30 | Stop reason: SURG

## 2025-05-30 RX ORDER — ONDANSETRON 2 MG/ML
INJECTION INTRAMUSCULAR; INTRAVENOUS PRN
Status: DISCONTINUED | OUTPATIENT
Start: 2025-05-30 | End: 2025-05-30 | Stop reason: SURG

## 2025-05-30 RX ORDER — SODIUM CHLORIDE 9 MG/ML
INJECTION, SOLUTION INTRAVENOUS CONTINUOUS
Status: DISCONTINUED | OUTPATIENT
Start: 2025-05-30 | End: 2025-06-01 | Stop reason: HOSPADM

## 2025-05-30 RX ORDER — ROCURONIUM BROMIDE 10 MG/ML
INJECTION, SOLUTION INTRAVENOUS PRN
Status: DISCONTINUED | OUTPATIENT
Start: 2025-05-30 | End: 2025-05-30 | Stop reason: SURG

## 2025-05-30 RX ORDER — SODIUM CHLORIDE, SODIUM LACTATE, POTASSIUM CHLORIDE, CALCIUM CHLORIDE 600; 310; 30; 20 MG/100ML; MG/100ML; MG/100ML; MG/100ML
INJECTION, SOLUTION INTRAVENOUS CONTINUOUS
Status: DISCONTINUED | OUTPATIENT
Start: 2025-05-30 | End: 2025-06-01 | Stop reason: HOSPADM

## 2025-05-30 RX ADMIN — SODIUM CHLORIDE, POTASSIUM CHLORIDE, SODIUM LACTATE AND CALCIUM CHLORIDE: 600; 310; 30; 20 INJECTION, SOLUTION INTRAVENOUS at 03:38

## 2025-05-30 RX ADMIN — Medication 100 MCG: at 13:22

## 2025-05-30 RX ADMIN — DEXAMETHASONE SODIUM PHOSPHATE 4 MG: 4 INJECTION INTRA-ARTICULAR; INTRALESIONAL; INTRAMUSCULAR; INTRAVENOUS; SOFT TISSUE at 13:20

## 2025-05-30 RX ADMIN — FENTANYL CITRATE 50 MCG: 50 INJECTION, SOLUTION INTRAMUSCULAR; INTRAVENOUS at 13:09

## 2025-05-30 RX ADMIN — OXYCODONE HYDROCHLORIDE 10 MG: 10 TABLET ORAL at 02:22

## 2025-05-30 RX ADMIN — PIPERACILLIN AND TAZOBACTAM 3.38 G: 3; .375 INJECTION, POWDER, FOR SOLUTION INTRAVENOUS at 20:13

## 2025-05-30 RX ADMIN — SODIUM CHLORIDE: 9 INJECTION, SOLUTION INTRAVENOUS at 16:30

## 2025-05-30 RX ADMIN — SUGAMMADEX 200 MG: 100 INJECTION, SOLUTION INTRAVENOUS at 13:32

## 2025-05-30 RX ADMIN — ONDANSETRON 4 MG: 2 INJECTION INTRAMUSCULAR; INTRAVENOUS at 13:23

## 2025-05-30 RX ADMIN — TIMOLOL MALEATE 1 DROP: 5 SOLUTION OPHTHALMIC at 05:07

## 2025-05-30 RX ADMIN — HYDROMORPHONE HYDROCHLORIDE 0.5 MG: 1 INJECTION, SOLUTION INTRAMUSCULAR; INTRAVENOUS; SUBCUTANEOUS at 00:20

## 2025-05-30 RX ADMIN — PIPERACILLIN AND TAZOBACTAM 3.38 G: 3; .375 INJECTION, POWDER, FOR SOLUTION INTRAVENOUS at 17:37

## 2025-05-30 RX ADMIN — Medication 100 MCG: at 13:19

## 2025-05-30 RX ADMIN — LATANOPROST 1 DROP: 50 SOLUTION OPHTHALMIC at 20:26

## 2025-05-30 RX ADMIN — HEPARIN SODIUM 5000 UNITS: 5000 INJECTION, SOLUTION INTRAVENOUS; SUBCUTANEOUS at 23:07

## 2025-05-30 RX ADMIN — ROCURONIUM BROMIDE 50 MG: 10 INJECTION INTRAVENOUS at 13:10

## 2025-05-30 RX ADMIN — KETOROLAC TROMETHAMINE 15 MG: 15 INJECTION, SOLUTION INTRAMUSCULAR; INTRAVENOUS at 13:32

## 2025-05-30 RX ADMIN — PROPOFOL 150 MG: 10 INJECTION, EMULSION INTRAVENOUS at 13:10

## 2025-05-30 RX ADMIN — PANTOPRAZOLE SODIUM 40 MG: 40 INJECTION, POWDER, FOR SOLUTION INTRAVENOUS at 05:07

## 2025-05-30 RX ADMIN — HEPARIN SODIUM 5000 UNITS: 5000 INJECTION, SOLUTION INTRAVENOUS; SUBCUTANEOUS at 05:07

## 2025-05-30 RX ADMIN — POTASSIUM CHLORIDE 20 MEQ: 1500 TABLET, EXTENDED RELEASE ORAL at 17:21

## 2025-05-30 RX ADMIN — LIDOCAINE HYDROCHLORIDE 60 MG: 20 INJECTION, SOLUTION EPIDURAL; INFILTRATION; INTRACAUDAL; PERINEURAL at 13:10

## 2025-05-30 RX ADMIN — POTASSIUM CHLORIDE 20 MEQ: 1500 TABLET, EXTENDED RELEASE ORAL at 05:07

## 2025-05-30 RX ADMIN — SODIUM CHLORIDE, POTASSIUM CHLORIDE, SODIUM LACTATE AND CALCIUM CHLORIDE: 600; 310; 30; 20 INJECTION, SOLUTION INTRAVENOUS at 12:07

## 2025-05-30 RX ADMIN — SENNOSIDES AND DOCUSATE SODIUM 2 TABLET: 50; 8.6 TABLET ORAL at 17:21

## 2025-05-30 ASSESSMENT — COGNITIVE AND FUNCTIONAL STATUS - GENERAL
MOBILITY SCORE: 24
SUGGESTED CMS G CODE MODIFIER DAILY ACTIVITY: CH
DAILY ACTIVITIY SCORE: 24
SUGGESTED CMS G CODE MODIFIER MOBILITY: CH

## 2025-05-30 ASSESSMENT — PAIN DESCRIPTION - PAIN TYPE
TYPE: SURGICAL PAIN
TYPE: ACUTE PAIN
TYPE: SURGICAL PAIN
TYPE: ACUTE PAIN
TYPE: SURGICAL PAIN
TYPE: SURGICAL PAIN
TYPE: ACUTE PAIN

## 2025-05-30 ASSESSMENT — PAIN SCALES - GENERAL: PAIN_LEVEL: 0

## 2025-05-30 ASSESSMENT — PATIENT HEALTH QUESTIONNAIRE - PHQ9
SUM OF ALL RESPONSES TO PHQ9 QUESTIONS 1 AND 2: 0
1. LITTLE INTEREST OR PLEASURE IN DOING THINGS: NOT AT ALL

## 2025-05-30 NOTE — ANESTHESIA TIME REPORT
Anesthesia Start and Stop Event Times       Date Time Event    5/30/2025 1250 Ready for Procedure     1305 Anesthesia Start     1341 Anesthesia Stop          Responsible Staff  05/30/25      Name Role Begin End    Jose Alfredo Lane M.D. Anesth 1305 1341          Overtime Reason:  no overtime (within assigned shift)    Comments:

## 2025-05-30 NOTE — ANESTHESIA PREPROCEDURE EVALUATION
Case: [id number] Anesthesia Start Date/Time: 05/30/25 1305    Procedure: ERCP (ENDOSCOPIC RETROGRADE CHOLANGIOPANCREATOGRAPHY) (Abdomen)    Anesthesia type: General    Pre-op diagnosis: 1. Choledocholithiasis 2.  Dilated CBD 3.Dilated gallbladder    Location:  ENDOSCOPIC ULTRASOUND ROOM / SURGERY [facility]    Surgeons: Asher Stevens M.D.            Relevant Problems   ANESTHESIA   (positive) DELMA on CPAP      CARDIAC   (positive) Atherosclerosis of aorta (HCC)      Other   (positive) Gallstone pancreatitis       Physical Exam    Airway   Mallampati: III  TM distance: >3 FB  Neck ROM: full       Cardiovascular - normal exam  Rhythm: regular  Rate: normal    (-) murmur     Dental - normal exam           Pulmonary - normal examBreath sounds clear to auscultation     Abdominal    Neurological - normal exam                   Anesthesia Plan    ASA 2       Plan - general       Airway plan will be ETT          Induction: intravenous    Postoperative Plan: Postoperative administration of opioids is intended.    Pertinent diagnostic labs and testing reviewed    Informed Consent:    Anesthetic plan and risks discussed with patient.    Use of blood products discussed with: patient whom consented to blood products.

## 2025-05-30 NOTE — PROGRESS NOTES
Received report from day shift RN. Assumed patient care at 1900. Patient is A&O4, on RA, no SOB noted. No signs of distress or discomfort. Plan of care for the night discussed with patient. Patient verbalized understanding. Patient resting in bed comfortably. Safety precautions in place. All patient belongings and call light within reach. All needs addressed at this time. Patient will call with any needs.

## 2025-05-30 NOTE — ASSESSMENT & PLAN NOTE
CBD dilation 2/2 stone  Pus evident on ERCP  IV zosyn post op, total 5 days abx now that source control  5/31 sp lap edita by

## 2025-05-30 NOTE — ANESTHESIA POSTPROCEDURE EVALUATION
Patient: Jamal Dee    Procedure Summary       Date: 05/30/25 Room / Location:  ENDOSCOPIC ULTRASOUND ROOM / SURGERY [facility]    Anesthesia Start: 1305 Anesthesia Stop: 1341    Procedure: ERCP (ENDOSCOPIC RETROGRADE CHOLANGIOPANCREATOGRAPHY) (Abdomen) Diagnosis: (1. Choledocholithiasis 2. Dilated CBD 3.Dilated gallbladder)    Surgeons: Asher Stevens M.D. Responsible Provider: Jose Alfredo Lane M.D.    Anesthesia Type: general ASA Status: 2            Final Anesthesia Type: general  Last vitals  BP   Blood Pressure : 93/55    Temp   36.7 °C (98.1 °F)    Pulse   72   Resp   16    SpO2   93 %      Anesthesia Post Evaluation    Patient location during evaluation: PACU  Patient participation: complete - patient participated  Level of consciousness: awake and alert  Pain score: 0    Airway patency: patent  Anesthetic complications: no  Cardiovascular status: hemodynamically stable  Respiratory status: acceptable  Hydration status: euvolemic    PONV: none          There were no known notable events for this encounter.     Nurse Pain Score: 0 (NPRS)

## 2025-05-30 NOTE — ANESTHESIA PROCEDURE NOTES
Airway    Date/Time: 5/30/2025 1:13 PM    Performed by: Jose Alfredo Lane M.D.  Authorized by: Jose Alfredo Lane M.D.    Location:  OR  Urgency:  Elective  Indications for Airway Management:  Anesthesia      Spontaneous Ventilation: absent    Sedation Level:  Deep  Preoxygenated: Yes    Patient Position:  Sniffing  Mask Difficulty Assessment:  1 - vent by mask  Final Airway Type:  Endotracheal airway  Final Endotracheal Airway:  ETT  Cuffed: Yes    Technique Used for Successful ETT Placement:  Video laryngoscopy    Insertion Site:  Oral  Blade Type:  Glide  Laryngoscope Blade/Videolaryngoscope Blade Size:  3  ETT Size (mm):  7.5  Measured from:  Lips  ETT to Lips (cm):  23  Placement Verified by: auscultation and capnometry    Cormack-Lehane Classification:  Grade I - full view of glottis  Number of Attempts at Approach:  1  Number of Other Approaches Attempted:  1  Unsuccessful Approach(es) for ETT:  Direct laryngoscopy   Grade 3 view with MAC 3, very anterior. Grade 1 view with glidescope 3.

## 2025-05-30 NOTE — OP REPORT
DATE OF SERVICE:  05/30/2025     PROCEDURE PERFORMED:  Endoscopic retrograde cholangiography with   sphincterotomy and removal of pus and stone.     PHYSICIAN:  Asher Stevens MD     ANESTHESIOLOGIST:  Rad Lebron MD     MEDICATION:  General anesthesia.     PREOPERATIVE DIAGNOSIS:  Gallstone pancreatitis.     POSTOPERATIVE DIAGNOSES:  1.  Swollen ampulla.  2.  A 12 mm sphincterotomy without bleeding.  3.  A 12 mm dredge biliary system removing significant amount of pus and   stone.  4.  Occlusion balloon cholangiogram revealing no further evidence of filling   defects with adequate drainage.     INDICATIONS:  Procedure risks and benefits were reviewed thoroughly with the   patient.  Risks including but not limited to bleeding, perforation, side   effects of medication were informed, the patient voiced understanding and   agreed to proceed.  Additional risks inherent to the ERCP, that being mild,   moderate, or severe pancreatitis that could lead to postprocedural pain,   prolonged hospitalization, intensive care unit stay, and/or death were   reviewed.  The patient voiced understanding and agreed to proceed.     DESCRIPTION OF PROCEDURE:  The patient was placed in a left lateral decubitus   position after intubation and sedation.  A side-viewing duodenoscope was   passed carefully and easily under indirect visualization of the esophagus,   passed to the second portion of the duodenum, brought in the shortened   position.  The ampulla appeared swollen, edematous.  Adjacent to the ampulla,   there was a small hyperemic mucosal change.  Proximal to this, there was a 2   cm duodenal diverticulum without complication.  A CleverCut sphincterotome   with a 0.035 wire was utilized to cannulate the ampulla and biliary trajectory   was appreciated in less than 1 second upon wire cannulation and confirmed by   aspiration of bile.  Subsequently, a 12 mm sphincterotomy was performed   adjacent to the hyperemic mucosal area  without involvement of this area.  At   no time was there any bleeding.  After which the wire was maintained, the tome   was exchanged for an extraction balloon, which allowed for extraction of a   large amount of pus as well as small stones.  The biliary system was then   irrigated with approximately 100 mL of sterile saline with removal of all pus   and then a repeat occlusion balloon cholangiogram revealed no further evidence   of any filling defects with adequate drainage.  The stomach was suctioned,   desufflated and scope was removed.     COMPLICATIONS:  None.     BLOOD LOSS:  None.     SPECIMENS:  None.     RECOMMENDATIONS:  Successful ERCP.  At no time was the pancreatic duct   cannulated or pancreatogram appreciated.  The results were reviewed with the   primary hospitalist who will consult General Surgery for elective   cholecystectomy.  The above was reviewed with the patient, who voiced   understanding and appreciation of the results.        ______________________________  MD MARTY Barry/AZM    DD:  05/30/2025 13:51  DT:  05/30/2025 15:12    Job#:  [id number]

## 2025-05-30 NOTE — PROGRESS NOTES
Report received from Brynn MUÑIZ, assumed care of pt at 0700.   POC and medications reviewed with pt. Pt verbalized understanding.   AOx4  C/o nothing at this time.  Denies pain, SOB, or dizziness at this time.   Safety measures in place.  Hourly rounding in place.

## 2025-05-30 NOTE — PROGRESS NOTES
Hospital Medicine Daily Progress Note    Date of Service  5/30/2025    Chief Complaint  Jamal Dee is a 74 y.o. male admitted 5/28/2025 with nv, LUQ pain    Hospital Course  73 yo with hx of gallstones presented with n/v, pale stool, LUQ pain found to have pancreatitis with lipase in 3000s and elev bili c/f gallstone pancreatitis and biliary obstruction. MRCP w CBD stone, GI consulted. Sp ERCP by DR. Stevens with stone extraction and pus drainage. IV abx started. Gen Surge consulted.    Interval Problem Update  - pt awaiting ERCP  - bili is up to 4.0  - I spoke with DR. Stevens who reported evidence of pus thus I will start abx  -I consulted dr Finn for elective lap edita  - IV dilaudid 0.5mg x 1 this AM for pain    I have discussed this patient's plan of care and discharge plan at IDT rounds today with Case Management, Nursing, Nursing leadership, and other members of the IDT team.    Consultants/Specialty  GI    Code Status  Full Code    Disposition  The patient is not medically cleared for discharge to home or a post-acute facility.  Anticipate discharge to: home with close outpatient follow-up    I have placed the appropriate orders for post-discharge needs.    Review of Systems  Review of Systems   All other systems reviewed and are negative.       Physical Exam  Temp:  [36.4 °C (97.5 °F)-37.7 °C (99.8 °F)] 36.4 °C (97.5 °F)  Pulse:  [76-89] 76  Resp:  [12-18] 12  BP: ()/(44-77) 86/44  SpO2:  [83 %-98 %] 92 %    Physical Exam  General: NAD, resting comfortably  HEENT: PERRLA, EOMI  Cards: RRR  Pulm: normal respiratory effort, CTAB, no wheezes or rhonchi  Abdomen: LUQ pain  MSK: normal ROM of upper and lower extremities  Neuro: CN II-XII grossly intact, sensation/strength intact, AAOx3  Psych: Appropriate mood   Fluids    Intake/Output Summary (Last 24 hours) at 5/30/2025 1440  Last data filed at 5/30/2025 1340  Gross per 24 hour   Intake 640 ml   Output 275 ml   Net 365 ml         Laboratory  Recent Labs     05/28/25  1450 05/29/25  0059   WBC 11.1* 10.0   RBC 4.15* 4.11*   HEMOGLOBIN 12.3* 12.3*   HEMATOCRIT 38.5* 38.6*   MCV 92.8 93.9   MCH 29.6 29.9   MCHC 31.9* 31.9*   RDW 50.1* 49.7   PLATELETCT 303 297   MPV 9.6 10.0     Recent Labs     05/28/25  1450 05/29/25  0059 05/30/25  0046   SODIUM 141 140 136   POTASSIUM 3.5* 3.4* 3.6   CHLORIDE 103 106 106   CO2 26 20 20   GLUCOSE 94 75 83   BUN 12 10 7*   CREATININE 0.74 0.62 0.67   CALCIUM 9.3 8.3* 8.1*                   Imaging  [id number] [id number]   Final Result      Digitized intraoperative radiograph is submitted for review. This examination is not for diagnostic purpose but for guidance during a surgical procedure. Please see the patient's chart for full procedural details.         INTERPRETING LOCATION: [address] [facility], [place], [address]      DX-PORTABLE FLUOROSCOPY < 1 HOUR Reason For Exam: other   Final Result      Portable fluoroscopy utilized for 7.2 seconds.      INTERPRETING LOCATION: [address] [facility], [place], [address]      [id number]   Final Result      1.  Choledocholithiasis with a 7 mm stone in the distal CBD.   2.  Dilated CBD and intrahepatic bile ducts.   3.  Cholelithiasis. Dilated gallbladder with mild pericholecystic fluid.      CT-ABDOMEN-PELVIS WITH   Final Result      1.  Cholelithiasis.      2.  7 mm calcified stone appears to be present in the distal common bile duct. There is mild to moderate intrahepatic biliary ductal dilatation. There is also dilatation of the common bile duct proximal to the stone.      3.  No other acute abnormalities are identified in the abdomen or pelvis.         DX-CHEST-PORTABLE (1 VIEW)   Final Result      No evidence of acute cardiopulmonary process.           Assessment/Plan  * Choledocholithiasis  Assessment & Plan  Distal stone in CBD  Sp ERCP with stone extraction, pus drainage, I discussed findingds with DR. Rodney garcia start IV zosyn  Dc IV PPI    Cholangitis  Assessment &  Plan  CBD dilation 2/2 stone  Pus evident on ERCP  IV zosyn post op, total 5 days abx now that source control    Elevated liver enzymes- (present on admission)  Assessment & Plan  Liver enzymes are elevated secondary to the common bile duct obstruction.  Bili 2.6 now down trending  Fluid resuscitation has been ordered  MRCP with distal CBD stone  I consulted LINDA Marie  LFTS up today bili up to 4.0  Dr. Stevens performed ERCP sp stone removal, pus drainage    Hypokalemia- (present on admission)  Assessment & Plan  I will check cmp in am    Normochromic normocytic anemia- (present on admission)  Assessment & Plan  noted    Leukocytosis- (present on admission)  Assessment & Plan  Pus drainage per ERCP report  I will start abx    Gallstone pancreatitis- (present on admission)  Assessment & Plan  Nv pale stools for 10 days  Lipase 3000s  Likely 2/2 gallstone pancreatitis  Pancreatic duct is nl on MRCP  IVF 200cc/hr for today  Bladder scan as not as much UOP  Repeat lipase in AM  Repeat CMP in AM  IV dilaudid 0.5mg x 1 today and also oxy 10mg x 2  I consulted [facility] GI - sp ERCP  Lipase down trending    Cholelithiasis- (present on admission)  Assessment & Plan  I anticipate will need lap edita during this stay after ercp  I consulted DR. Finn Gen Surgery    BPH (benign prostatic hypertrophy) with urinary retention- (present on admission)  Assessment & Plan  Cw home meds    Mixed hyperlipidemia- (present on admission)  Assessment & Plan  Cw statin    DELMA on CPAP- (present on admission)  Assessment & Plan  Cpap qhs         VTE prophylaxis:  [facility]    I have performed a physical exam and reviewed and updated ROS and Plan today (5/30/2025). In review of yesterday's note (5/29/2025), there are no changes except as documented above.    I spent 55  minutes providing care for this patient.  This included face-to-face interview, physical examination.  Review of lab work including CBC, BMP, MRI Discussion with multidisciplinary  team including case management, nursing staff and pharmacy and GI

## 2025-05-30 NOTE — ASSESSMENT & PLAN NOTE
Distal stone in CBD  Sp ERCP with stone extraction, pus drainage, I discussed findingds with DR. Rodney Carlisle IV Monetsyharrison  Sp lap edita 5/31

## 2025-05-30 NOTE — CARE PLAN
The patient is Stable - Low risk of patient condition declining or worsening    Shift Goals  Clinical Goals: Patient pain will be managed at a tolerable level of 3/10 or less throughout night, Patient will remain free from fall or injury  Patient Goals: Rest comfortably, pain management  Family Goals: n/a    Progress made toward(s) clinical / shift goals:  Patient's pain maintained at a tolerable level of 3/10 or less throughout shift with medication per MAR and rest. Pt did not sustain a fall or injury during shift. Able to rest comfortably throughout shift.     Patient is not progressing towards the following goals:

## 2025-05-30 NOTE — OR NURSING
1339: Pt arrived to PACU. Simple mask 6L in place. surgical dressing placed in OR, CDI. VSS.       1351: Called Mayte boston for updates      1400: Patient is tolerating sips    1436: Patient meets criteria for  hospital room Called report to ANTONINO Nuñez

## 2025-05-30 NOTE — CONSULTS
`                                           Gastroenterology Consult Note     Date of Consult: 5/29/2025     Reason for consult: Choledocholithiasis     HPI:   74-year-old male came to the hospital with complaints of nausea vomiting and epigastric pain for more than a week.  Admission labs consistent with acute pancreatitis with a lipase of over 3000.  LFTs were abnormal with elevated transaminases and alkaline phosphatase with total bilirubin of 2.6 which trended lower after 24 hours.  MRCP today revealed dilated CBD up to 1.7 cm containing a 7 mm stone in the distal CBD.  Patient is symptomatically feeling much better with complete resolution of abdominal pain.  He is tolerating clear liquids.  Denies fever or chills.  This is the first episode of pancreatitis.     PMHX:Past Medical History[1]       Diagnosis Date    Glaucoma      Hyperlipidemia         PSurgHx: Past Surgical History[2]  No relevant past surgical history    ALLERGIES:Other environmental    Medications Ordered Prior to Encounter[3]     Current Medications[4]      Current Facility-Administered Medications:     pantoprazole (Protonix) injection 40 mg, 40 mg, Intravenous, BID, Bill Crain D.O., 40 mg at 05/29/25 1723    oxyCODONE immediate-release (Roxicodone) tablet 5 mg, 5 mg, Oral, Q3HRS PRN **OR** oxyCODONE immediate release (Roxicodone) tablet 10 mg, 10 mg, Oral, Q3HRS PRN, 10 mg at 05/29/25 1510 **OR** HYDROmorphone (Dilaudid) injection 0.5 mg, 0.5 mg, Intravenous, Q3HRS PRN, Loren Atkins M.D.    timolol (Timoptic) 0.5 % ophthalmic solution 1 Drop, 1 Drop, Both Eyes, QAM, Jeimy Hitchcock M.D., 1 Drop at 05/29/25 0534    heparin injection 5,000 Units, 5,000 Units, Subcutaneous, Q8HRS, Jeimy Hitchcock M.D., 5,000 Units at 05/29/25 1510    acetaminophen (Tylenol) tablet 650 mg, 650 mg, Oral, Q6HRS PRN, Jeimy Hitchcock M.D.    senna-docusate (Pericolace Or Senokot S) 8.6-50 MG per tablet 2 Tablet, 2 Tablet, Oral, Q EVENING, 2 Tablet at  25 1723 **AND** polyethylene glycol/lytes (Miralax) Packet 1 Packet, 1 Packet, Oral, QDAY PRN, Jeimy Hitchcock M.D.    labetalol (Normodyne/Trandate) injection 10 mg, 10 mg, Intravenous, Q4HRS PRN, Jeimy Hitchcock M.D.    ondansetron (Zofran) syringe/vial injection 4 mg, 4 mg, Intravenous, Q4HRS PRN **OR** ondansetron (Zofran ODT) dispertab 4 mg, 4 mg, Oral, Q4HRS PRN, Jeimy Hitchcock M.D.    insulin lispro (HumaLOG,AdmeLOG) subcutaneous injection, 1-6 Units, Subcutaneous, 4X/DAY ACHS **AND** POC blood glucose manual result, , , Q AC AND BEDTIME(S) **AND** NOTIFY MD and PharmD, , , Once **AND** Administer 20 grams of glucose (approximately 8 ounces of fruit juice) every 15 minutes PRN FSBG less than 70 mg/dL, , , PRN **AND** dextrose 50 % (D50W) injection 25 g, 25 g, Intravenous, Q15 MIN PRN, Jeimy Hitchcock M.D.    potassium chloride SA (Kdur) tablet 20 mEq, 20 mEq, Oral, BID, Jeimy Hitchcock M.D., 20 mEq at 25 1723    latanoprost (Xalatan) 0.005 % ophthalmic solution 1 Drop, 1 Drop, Both Eyes, QHS, Jeimy Hitchcock M.D., 1 Drop a      SocHx:   Social History     Socioeconomic History    Marital status:      Spouse name: Not on file    Number of children: 2    Years of education: Not on file    Highest education level: Not on file   Occupational History    Not on file   Tobacco Use    Smoking status: Former     Current packs/day: 0.00     Average packs/day: 1.5 packs/day for 15.0 years (22.5 ttl pk-yrs)     Types: Cigarettes     Start date: 1969     Quit date: 1984     Years since quittin.3    Smokeless tobacco: Never    Tobacco comments:     quit    Vaping Use    Vaping status: Never Used   Substance and Sexual Activity    Alcohol use: Yes     Alcohol/week: 4.2 oz     Types: 7 Glasses of wine per week     Comment: 1 glass a night    Drug use: No    Sexual activity: Not Currently     Partners: Female   Other Topics Concern    Not on file   Social History Narrative    Not on file      Social Drivers of Health     Financial Resource Strain: Low Risk  (2/27/2024)    Overall Financial Resource Strain (CARDIA)     Difficulty of Paying Living Expenses: Not hard at all   Food Insecurity: No Food Insecurity (5/28/2025)    Hunger Vital Sign     Worried About Running Out of Food in the Last Year: Never true     Ran Out of Food in the Last Year: Never true   Transportation Needs: No Transportation Needs (5/28/2025)    PRAPARE - Transportation     Lack of Transportation (Medical): No     Lack of Transportation (Non-Medical): No   Physical Activity: Not on file   Stress: Not on file   Social Connections: Not on file   Intimate Partner Violence: Not At Risk (5/28/2025)    Humiliation, Afraid, Rape, and Kick questionnaire     Fear of Current or Ex-Partner: No     Emotionally Abused: No     Physically Abused: No     Sexually Abused: No   Housing Stability: Low Risk  (5/28/2025)    Housing Stability Vital Sign     Unable to Pay for Housing in the Last Year: No     Number of Times Moved in the Last Year: 0     Homeless in the Last Year: No        FAMHx:   Family History   Problem Relation Age of Onset    Heart Attack Father     No Known Problems Mother     Cancer Sister 62        colon cancer    No Known Problems Maternal Grandmother     No Known Problems Maternal Grandfather     No Known Problems Paternal Grandmother     No Known Problems Paternal Grandfather     No Known Problems Brother     No Known Problems Brother     No Known Problems Brother     No Known Problems Brother     Sleep Apnea Neg Hx         ROS:  Constitutional: Negative for fever, chills, fatigue or unintentional weight loss  HEENT: Negative for visual changes or epistaxis  CARDIO: Negative for chest pain, palpitations  PULM: Negative for cough, breathing difficulty or hemoptysis  NEURO: Negative for seizure, stroke or mental status changes  GI: as above  : Negative for dysuria or hematuria  HEME: Negative for anemia, thrombocytopenia or  "easy bruising  MUSCULOSKELETAL: Negative for arthritis, arthralgia or joint deformities  PSYCH: Negative for major depression or other psychiatric illnesses  SKIN: Negative for pruritus     PE:  Vitals:    05/29/25 0500 05/29/25 0832 05/29/25 1510 05/29/25 1552   BP: 93/64   91/55   Pulse: 82   88   Resp: 18 18 18 18   Temp: 36.7 °C (98.1 °F)   36.7 °C (98.1 °F)   TempSrc: Temporal   Temporal   SpO2: 91%   92%   Weight:       Height:           General: Average nourished, alert and oriented, no acute distress.  HEENT: Atraumatic, normocephalic, conjunctiva clear, no jaundice  Neck: No goiter, no cervical or supraclavicular adenopathy  CVS: Regular rate and rhythm, no murmur  Pulmonary: Trachea midline, chest expansion symmetrical, normal respiratory effort, no wheezing or crackles  Abdomen: Soft, nondistended, nontender, no hepatosplenomegaly, no masses palpable, no ascites  Extremities: No edema  NEURO: Normal speech, moves all extremities, no focal deficit  Skin: No jaundice or rashes  Psychiatry: Normal affect     LABS:  Lab Results   Component Value Date/Time    SODIUM 140 05/29/2025 12:59 AM    POTASSIUM 3.4 (L) 05/29/2025 12:59 AM    CHLORIDE 106 05/29/2025 12:59 AM    CO2 20 05/29/2025 12:59 AM    GLUCOSE 75 05/29/2025 12:59 AM    BUN 10 05/29/2025 12:59 AM    CREATININE 0.62 05/29/2025 12:59 AM      Lab Results   Component Value Date/Time    WBC 10.0 05/29/2025 12:59 AM    RBC 4.11 (L) 05/29/2025 12:59 AM    HEMOGLOBIN 12.3 (L) 05/29/2025 12:59 AM    HEMATOCRIT 38.6 (L) 05/29/2025 12:59 AM    MCV 93.9 05/29/2025 12:59 AM    MCH 29.9 05/29/2025 12:59 AM    MCHC 31.9 (L) 05/29/2025 12:59 AM    MPV 10.0 05/29/2025 12:59 AM    NEUTSPOLYS 90.70 (H) 05/28/2025 02:50 PM    LYMPHOCYTES 5.00 (L) 05/28/2025 02:50 PM    MONOCYTES 3.40 05/28/2025 02:50 PM    EOSINOPHILS 0.30 05/28/2025 02:50 PM    BASOPHILS 0.10 05/28/2025 02:50 PM        No results found for: \"PROTHROMBTM\", \"INR\"   Recent Labs     05/28/25  1450 " 05/29/25  0059   ASTSGOT 138* 92*   ALTSGPT 193* 151*   TBILIRUBIN 2.6* 1.4   GLOBULIN 3.7* 3.3         IMAGING: Reviewed personally and summarized in HPI  [id number]   Final Result      1.  Choledocholithiasis with a 7 mm stone in the distal CBD.   2.  Dilated CBD and intrahepatic bile ducts.   3.  Cholelithiasis. Dilated gallbladder with mild pericholecystic fluid.      CT-ABDOMEN-PELVIS WITH   Final Result      1.  Cholelithiasis.      2.  7 mm calcified stone appears to be present in the distal common bile duct. There is mild to moderate intrahepatic biliary ductal dilatation. There is also dilatation of the common bile duct proximal to the stone.      3.  No other acute abnormalities are identified in the abdomen or pelvis.         DX-CHEST-PORTABLE (1 VIEW)   Final Result      No evidence of acute cardiopulmonary process.              ASSESSMENT:   Acute uncomplicated gallstone pancreatitis  Choledocholithiasis  Cholelithiasis     PLAN:   For management of choledocholithiasis, we will proceed with ERCP in a.m.  Discussed procedure sphincterotomy and stone removal.  Also explained in detail potential complications including perforation bleeding infection and pancreatitis.  After detailed discussion, he wishes to proceed as recommended.  For management of cholelithiasis, patient will undergo cholecystectomy after ERCP.  N.p.o. after midnight  Continue IV hydration        This note was generated using voice recognition software and has a small chance of producing errors of grammar and possibly content.  Reasonable attempt has been made to find and correct any obvious errors but expect that some may not be found prior to finalization of this note.    Fernando Marie M.D.  [facility]  [phone]        [1]   Past Medical History:  Diagnosis Date    Glaucoma     Hyperlipidemia    [2]   Past Surgical History:  Procedure Laterality Date    CATARACT PHACO WITH IOL  5/16/2013    Performed by Theodore  VESNA Guzman M.D. at SURGERY [facility] ORS    CATARACT PHACO WITH IOL  5/2/2013    Performed by Theodore Guzman M.D. at [facility] ORS   [3]   No current facility-administered medications on file prior to encounter.     Current Outpatient Medications on File Prior to Encounter   Medication Sig Dispense Refill    atorvastatin (LIPITOR) 40 MG Tab TAKE ONE TABLET BY MOUTH AT BEDTIME (Patient taking differently: Take 40 mg by mouth at bedtime. TAKE ONE TABLET BY MOUTH AT BEDTIME) 100 Tablet 3    dutasteride (AVODART) 0.5 MG capsule TAKE ONE CAPSULE BY MOUTH ONE TIME DAILY (Patient taking differently: Take 0.5 mg by mouth every day. TAKE ONE CAPSULE BY MOUTH ONE TIME DAILY) 100 Capsule 3    metFORMIN ER (GLUCOPHAGE XR) 500 MG TABLET SR 24 HR Take 1 Tablet by mouth 2 times a day. 200 Tablet 0    tamsulosin (FLOMAX) 0.4 MG capsule Take 1 Capsule by mouth every day. 100 Capsule 3    famciclovir (FAMVIR) 125 MG Tab Take 1 Tablet by mouth 3 times a day as needed (recurrent herpes outbreaks). 60 Tablet 1    Omega-3 Fatty Acids (FISH OIL) 1000 MG Cap capsule Take 1,000 mg by mouth every day.      timolol (TIMOPTIC) 0.5 % Solution Administer 1 Drop into both eyes every morning.  6    bimatoprost (LUMIGAN) 0.01 % Solution Administer 1 Drop into both eyes at bedtime.      triamcinolone acetonide (KENALOG) 0.1 % Ointment Apply thin layer onto affected twice daily as needed for symptoms. (Patient not taking: Reported on 5/28/2025) 30 g 2   [4]   Current Facility-Administered Medications:     pantoprazole (Protonix) injection 40 mg, 40 mg, Intravenous, BID, Bill Crain D.O., 40 mg at 05/29/25 1723    oxyCODONE immediate-release (Roxicodone) tablet 5 mg, 5 mg, Oral, Q3HRS PRN **OR** oxyCODONE immediate release (Roxicodone) tablet 10 mg, 10 mg, Oral, Q3HRS PRN, 10 mg at 05/29/25 1510 **OR** HYDROmorphone (Dilaudid) injection 0.5 mg, 0.5 mg, Intravenous, Q3HRS PRN, Loren Atkins M.D.    timolol (Timoptic) 0.5 %  ophthalmic solution 1 Drop, 1 Drop, Both Eyes, QAM, Jeimy Hitchcock M.D., 1 Drop at 05/29/25 0534    heparin injection 5,000 Units, 5,000 Units, Subcutaneous, Q8HRS, Jeimy Hitchcock M.D., 5,000 Units at 05/29/25 1510    acetaminophen (Tylenol) tablet 650 mg, 650 mg, Oral, Q6HRS PRN, Jeimy Hitchcock M.D.    senna-docusate (Pericolace Or Senokot S) 8.6-50 MG per tablet 2 Tablet, 2 Tablet, Oral, Q EVENING, 2 Tablet at 05/29/25 1723 **AND** polyethylene glycol/lytes (Miralax) Packet 1 Packet, 1 Packet, Oral, QDAY PRN, Jeimy Hitchcock M.D.    labetalol (Normodyne/Trandate) injection 10 mg, 10 mg, Intravenous, Q4HRS PRN, Jeimy Hitchcock M.D.    ondansetron (Zofran) syringe/vial injection 4 mg, 4 mg, Intravenous, Q4HRS PRN **OR** ondansetron (Zofran ODT) dispertab 4 mg, 4 mg, Oral, Q4HRS PRN, Jeimy Hitchcock M.D.    insulin lispro (HumaLOG,AdmeLOG) subcutaneous injection, 1-6 Units, Subcutaneous, 4X/DAY ACHS **AND** POC blood glucose manual result, , , Q AC AND BEDTIME(S) **AND** NOTIFY MD and PharmD, , , Once **AND** Administer 20 grams of glucose (approximately 8 ounces of fruit juice) every 15 minutes PRN FSBG less than 70 mg/dL, , , PRN **AND** dextrose 50 % (D50W) injection 25 g, 25 g, Intravenous, Q15 MIN PRN, Jeimy Hitchcock M.D.    potassium chloride SA (Kdur) tablet 20 mEq, 20 mEq, Oral, BID, Jeimy Hitchcock M.D., 20 mEq at 05/29/25 1723    latanoprost (Xalatan) 0.005 % ophthalmic solution 1 Drop, 1 Drop, Both Eyes, QHS, Jeimy Hitchcock M.D., 1 Drop at 05/28/25 7328

## 2025-05-31 ENCOUNTER — ANESTHESIA EVENT (OUTPATIENT)
Dept: SURGERY | Facility: MEDICAL CENTER | Age: 75
End: 2025-05-31
Payer: MEDICARE

## 2025-05-31 ENCOUNTER — ANESTHESIA (OUTPATIENT)
Dept: SURGERY | Facility: MEDICAL CENTER | Age: 75
End: 2025-05-31
Payer: MEDICARE

## 2025-05-31 ENCOUNTER — PHARMACY VISIT (OUTPATIENT)
Dept: PHARMACY | Facility: MEDICAL CENTER | Age: 75
End: 2025-05-31
Payer: COMMERCIAL

## 2025-05-31 ENCOUNTER — SURGERY (OUTPATIENT)
Age: 75
End: 2025-05-31
Payer: MEDICARE

## 2025-05-31 VITALS
DIASTOLIC BLOOD PRESSURE: 73 MMHG | SYSTOLIC BLOOD PRESSURE: 124 MMHG | BODY MASS INDEX: 25.5 KG/M2 | HEART RATE: 70 BPM | TEMPERATURE: 97.3 F | WEIGHT: 162.48 LBS | OXYGEN SATURATION: 91 % | HEIGHT: 67 IN | RESPIRATION RATE: 18 BRPM

## 2025-05-31 PROBLEM — R09.02 HYPOXIA: Status: ACTIVE | Noted: 2025-05-31

## 2025-05-31 LAB
ALBUMIN SERPL BCP-MCNC: 2.6 G/DL (ref 3.2–4.9)
ALBUMIN/GLOB SERPL: 0.8 G/DL
ALP SERPL-CCNC: 642 U/L (ref 30–99)
ALT SERPL-CCNC: 110 U/L (ref 2–50)
ANION GAP SERPL CALC-SCNC: 12 MMOL/L (ref 7–16)
AST SERPL-CCNC: 74 U/L (ref 12–45)
BASOPHILS # BLD AUTO: 0.1 % (ref 0–1.8)
BASOPHILS # BLD: 0.01 K/UL (ref 0–0.12)
BILIRUB SERPL-MCNC: 2 MG/DL (ref 0.1–1.5)
BUN SERPL-MCNC: 11 MG/DL (ref 8–22)
CALCIUM ALBUM COR SERPL-MCNC: 9.4 MG/DL (ref 8.5–10.5)
CALCIUM SERPL-MCNC: 8.3 MG/DL (ref 8.5–10.5)
CHLORIDE SERPL-SCNC: 105 MMOL/L (ref 96–112)
CO2 SERPL-SCNC: 21 MMOL/L (ref 20–33)
CREAT SERPL-MCNC: 0.71 MG/DL (ref 0.5–1.4)
EOSINOPHIL # BLD AUTO: 0 K/UL (ref 0–0.51)
EOSINOPHIL NFR BLD: 0 % (ref 0–6.9)
ERYTHROCYTE [DISTWIDTH] IN BLOOD BY AUTOMATED COUNT: 49 FL (ref 35.9–50)
GFR SERPLBLD CREATININE-BSD FMLA CKD-EPI: 95 ML/MIN/1.73 M 2
GLOBULIN SER CALC-MCNC: 3.2 G/DL (ref 1.9–3.5)
GLUCOSE BLD STRIP.AUTO-MCNC: 102 MG/DL (ref 65–99)
GLUCOSE BLD STRIP.AUTO-MCNC: 118 MG/DL (ref 65–99)
GLUCOSE BLD STRIP.AUTO-MCNC: 129 MG/DL (ref 65–99)
GLUCOSE BLD STRIP.AUTO-MCNC: 138 MG/DL (ref 65–99)
GLUCOSE SERPL-MCNC: 108 MG/DL (ref 65–99)
HCT VFR BLD AUTO: 38 % (ref 42–52)
HGB BLD-MCNC: 12.2 G/DL (ref 14–18)
IMM GRANULOCYTES # BLD AUTO: 0.04 K/UL (ref 0–0.11)
IMM GRANULOCYTES NFR BLD AUTO: 0.4 % (ref 0–0.9)
LIPASE SERPL-CCNC: 687 U/L (ref 11–82)
LYMPHOCYTES # BLD AUTO: 0.66 K/UL (ref 1–4.8)
LYMPHOCYTES NFR BLD: 6.2 % (ref 22–41)
MAGNESIUM SERPL-MCNC: 2.2 MG/DL (ref 1.5–2.5)
MCH RBC QN AUTO: 29.5 PG (ref 27–33)
MCHC RBC AUTO-ENTMCNC: 32.1 G/DL (ref 32.3–36.5)
MCV RBC AUTO: 91.8 FL (ref 81.4–97.8)
MONOCYTES # BLD AUTO: 0.47 K/UL (ref 0–0.85)
MONOCYTES NFR BLD AUTO: 4.4 % (ref 0–13.4)
NEUTROPHILS # BLD AUTO: 9.51 K/UL (ref 1.82–7.42)
NEUTROPHILS NFR BLD: 88.9 % (ref 44–72)
NRBC # BLD AUTO: 0 K/UL
NRBC BLD-RTO: 0 /100 WBC (ref 0–0.2)
PLATELET # BLD AUTO: 200 K/UL (ref 164–446)
PMV BLD AUTO: 10.1 FL (ref 9–12.9)
POTASSIUM SERPL-SCNC: 4.1 MMOL/L (ref 3.6–5.5)
PROT SERPL-MCNC: 5.8 G/DL (ref 6–8.2)
RBC # BLD AUTO: 4.14 M/UL (ref 4.7–6.1)
SODIUM SERPL-SCNC: 138 MMOL/L (ref 135–145)
WBC # BLD AUTO: 10.7 K/UL (ref 4.8–10.8)

## 2025-05-31 PROCEDURE — 82962 GLUCOSE BLOOD TEST: CPT | Mod: 91

## 2025-05-31 PROCEDURE — 700111 HCHG RX REV CODE 636 W/ 250 OVERRIDE (IP): Mod: JZ | Performed by: HOSPITALIST

## 2025-05-31 PROCEDURE — 94760 N-INVAS EAR/PLS OXIMETRY 1: CPT

## 2025-05-31 PROCEDURE — 160048 HCHG OR STATISTICAL LEVEL 1-5: Performed by: STUDENT IN AN ORGANIZED HEALTH CARE EDUCATION/TRAINING PROGRAM

## 2025-05-31 PROCEDURE — 0FT44ZZ RESECTION OF GALLBLADDER, PERCUTANEOUS ENDOSCOPIC APPROACH: ICD-10-PCS | Performed by: STUDENT IN AN ORGANIZED HEALTH CARE EDUCATION/TRAINING PROGRAM

## 2025-05-31 PROCEDURE — 83735 ASSAY OF MAGNESIUM: CPT

## 2025-05-31 PROCEDURE — 700105 HCHG RX REV CODE 258: Performed by: ANESTHESIOLOGY

## 2025-05-31 PROCEDURE — 700111 HCHG RX REV CODE 636 W/ 250 OVERRIDE (IP): Mod: JZ | Performed by: INTERNAL MEDICINE

## 2025-05-31 PROCEDURE — 700105 HCHG RX REV CODE 258: Performed by: INTERNAL MEDICINE

## 2025-05-31 PROCEDURE — 160009 HCHG ANES TIME/MIN: Performed by: STUDENT IN AN ORGANIZED HEALTH CARE EDUCATION/TRAINING PROGRAM

## 2025-05-31 PROCEDURE — 36415 COLL VENOUS BLD VENIPUNCTURE: CPT

## 2025-05-31 PROCEDURE — 160002 HCHG RECOVERY MINUTES (STAT): Performed by: STUDENT IN AN ORGANIZED HEALTH CARE EDUCATION/TRAINING PROGRAM

## 2025-05-31 PROCEDURE — 700102 HCHG RX REV CODE 250 W/ 637 OVERRIDE(OP): Performed by: INTERNAL MEDICINE

## 2025-05-31 PROCEDURE — 160042 HCHG SURGERY MINUTES - EA ADDL 1 MIN LEVEL 5: Performed by: STUDENT IN AN ORGANIZED HEALTH CARE EDUCATION/TRAINING PROGRAM

## 2025-05-31 PROCEDURE — 80053 COMPREHEN METABOLIC PANEL: CPT

## 2025-05-31 PROCEDURE — 83690 ASSAY OF LIPASE: CPT

## 2025-05-31 PROCEDURE — 700101 HCHG RX REV CODE 250: Performed by: STUDENT IN AN ORGANIZED HEALTH CARE EDUCATION/TRAINING PROGRAM

## 2025-05-31 PROCEDURE — A9270 NON-COVERED ITEM OR SERVICE: HCPCS | Performed by: HOSPITALIST

## 2025-05-31 PROCEDURE — 700104 HCHG RX REV CODE 254: Performed by: STUDENT IN AN ORGANIZED HEALTH CARE EDUCATION/TRAINING PROGRAM

## 2025-05-31 PROCEDURE — 160015 HCHG STAT PREOP MINUTES: Performed by: STUDENT IN AN ORGANIZED HEALTH CARE EDUCATION/TRAINING PROGRAM

## 2025-05-31 PROCEDURE — 700102 HCHG RX REV CODE 250 W/ 637 OVERRIDE(OP): Performed by: HOSPITALIST

## 2025-05-31 PROCEDURE — 160035 HCHG PACU - 1ST 60 MINS PHASE I: Performed by: STUDENT IN AN ORGANIZED HEALTH CARE EDUCATION/TRAINING PROGRAM

## 2025-05-31 PROCEDURE — 502714 HCHG ROBOTIC SURGERY SERVICES: Performed by: STUDENT IN AN ORGANIZED HEALTH CARE EDUCATION/TRAINING PROGRAM

## 2025-05-31 PROCEDURE — 85025 COMPLETE CBC W/AUTO DIFF WBC: CPT

## 2025-05-31 PROCEDURE — 160031 HCHG SURGERY MINUTES - 1ST 30 MINS LEVEL 5: Performed by: STUDENT IN AN ORGANIZED HEALTH CARE EDUCATION/TRAINING PROGRAM

## 2025-05-31 PROCEDURE — RXMED WILLOW AMBULATORY MEDICATION CHARGE: Performed by: INTERNAL MEDICINE

## 2025-05-31 PROCEDURE — 700111 HCHG RX REV CODE 636 W/ 250 OVERRIDE (IP): Performed by: ANESTHESIOLOGY

## 2025-05-31 PROCEDURE — 700101 HCHG RX REV CODE 250: Performed by: ANESTHESIOLOGY

## 2025-05-31 PROCEDURE — BF50200 OTHER IMAGING OF BILE DUCTS USING FLUORESCING AGENT, INDOCYANINE GREEN DYE, INTRAOPERATIVE: ICD-10-PCS | Performed by: STUDENT IN AN ORGANIZED HEALTH CARE EDUCATION/TRAINING PROGRAM

## 2025-05-31 PROCEDURE — A9270 NON-COVERED ITEM OR SERVICE: HCPCS | Performed by: INTERNAL MEDICINE

## 2025-05-31 PROCEDURE — 770001 HCHG ROOM/CARE - MED/SURG/GYN PRIV*

## 2025-05-31 PROCEDURE — 99232 SBSQ HOSP IP/OBS MODERATE 35: CPT | Performed by: INTERNAL MEDICINE

## 2025-05-31 RX ORDER — SODIUM CHLORIDE, SODIUM LACTATE, POTASSIUM CHLORIDE, CALCIUM CHLORIDE 600; 310; 30; 20 MG/100ML; MG/100ML; MG/100ML; MG/100ML
INJECTION, SOLUTION INTRAVENOUS CONTINUOUS
Status: DISCONTINUED | OUTPATIENT
Start: 2025-05-31 | End: 2025-05-31 | Stop reason: HOSPADM

## 2025-05-31 RX ORDER — SODIUM CHLORIDE, SODIUM LACTATE, POTASSIUM CHLORIDE, CALCIUM CHLORIDE 600; 310; 30; 20 MG/100ML; MG/100ML; MG/100ML; MG/100ML
INJECTION, SOLUTION INTRAVENOUS
Status: DISCONTINUED | OUTPATIENT
Start: 2025-05-31 | End: 2025-05-31 | Stop reason: SURG

## 2025-05-31 RX ORDER — SUCCINYLCHOLINE CHLORIDE 20 MG/ML
INJECTION INTRAMUSCULAR; INTRAVENOUS PRN
Status: DISCONTINUED | OUTPATIENT
Start: 2025-05-31 | End: 2025-05-31 | Stop reason: SURG

## 2025-05-31 RX ORDER — INDOCYANINE GREEN AND WATER 25 MG
2.5 KIT INJECTION ONCE
Status: COMPLETED | OUTPATIENT
Start: 2025-05-31 | End: 2025-05-31

## 2025-05-31 RX ORDER — DEXAMETHASONE SODIUM PHOSPHATE 4 MG/ML
INJECTION, SOLUTION INTRA-ARTICULAR; INTRALESIONAL; INTRAMUSCULAR; INTRAVENOUS; SOFT TISSUE PRN
Status: DISCONTINUED | OUTPATIENT
Start: 2025-05-31 | End: 2025-05-31 | Stop reason: SURG

## 2025-05-31 RX ORDER — OXYCODONE HCL 5 MG/5 ML
5 SOLUTION, ORAL ORAL
Status: DISCONTINUED | OUTPATIENT
Start: 2025-05-31 | End: 2025-05-31 | Stop reason: HOSPADM

## 2025-05-31 RX ORDER — ALBUTEROL SULFATE 5 MG/ML
2.5 SOLUTION RESPIRATORY (INHALATION)
Status: DISCONTINUED | OUTPATIENT
Start: 2025-05-31 | End: 2025-05-31 | Stop reason: HOSPADM

## 2025-05-31 RX ORDER — EPHEDRINE SULFATE 50 MG/ML
5 INJECTION, SOLUTION INTRAVENOUS
Status: DISCONTINUED | OUTPATIENT
Start: 2025-05-31 | End: 2025-05-31 | Stop reason: HOSPADM

## 2025-05-31 RX ORDER — HALOPERIDOL 5 MG/ML
1 INJECTION INTRAMUSCULAR
Status: DISCONTINUED | OUTPATIENT
Start: 2025-05-31 | End: 2025-05-31 | Stop reason: HOSPADM

## 2025-05-31 RX ORDER — ROCURONIUM BROMIDE 10 MG/ML
INJECTION, SOLUTION INTRAVENOUS PRN
Status: DISCONTINUED | OUTPATIENT
Start: 2025-05-31 | End: 2025-05-31 | Stop reason: SURG

## 2025-05-31 RX ORDER — LIDOCAINE HYDROCHLORIDE 40 MG/ML
SOLUTION TOPICAL PRN
Status: DISCONTINUED | OUTPATIENT
Start: 2025-05-31 | End: 2025-05-31 | Stop reason: SURG

## 2025-05-31 RX ORDER — KETOROLAC TROMETHAMINE 15 MG/ML
INJECTION, SOLUTION INTRAMUSCULAR; INTRAVENOUS PRN
Status: DISCONTINUED | OUTPATIENT
Start: 2025-05-31 | End: 2025-05-31 | Stop reason: SURG

## 2025-05-31 RX ORDER — MAGNESIUM SULFATE HEPTAHYDRATE 40 MG/ML
INJECTION, SOLUTION INTRAVENOUS PRN
Status: DISCONTINUED | OUTPATIENT
Start: 2025-05-31 | End: 2025-05-31 | Stop reason: SURG

## 2025-05-31 RX ORDER — LIDOCAINE HYDROCHLORIDE 20 MG/ML
INJECTION, SOLUTION EPIDURAL; INFILTRATION; INTRACAUDAL; PERINEURAL PRN
Status: DISCONTINUED | OUTPATIENT
Start: 2025-05-31 | End: 2025-05-31 | Stop reason: SURG

## 2025-05-31 RX ORDER — PROMETHAZINE HYDROCHLORIDE 25 MG/1
25 TABLET ORAL EVERY 6 HOURS PRN
Status: DISCONTINUED | OUTPATIENT
Start: 2025-05-31 | End: 2025-06-01 | Stop reason: HOSPADM

## 2025-05-31 RX ORDER — OXYCODONE HYDROCHLORIDE 5 MG/1
5 TABLET ORAL EVERY 6 HOURS PRN
Qty: 20 TABLET | Refills: 0 | Status: SHIPPED | OUTPATIENT
Start: 2025-05-31 | End: 2025-06-06

## 2025-05-31 RX ORDER — ONDANSETRON 2 MG/ML
INJECTION INTRAMUSCULAR; INTRAVENOUS PRN
Status: DISCONTINUED | OUTPATIENT
Start: 2025-05-31 | End: 2025-05-31 | Stop reason: SURG

## 2025-05-31 RX ORDER — HYDRALAZINE HYDROCHLORIDE 20 MG/ML
5 INJECTION INTRAMUSCULAR; INTRAVENOUS
Status: DISCONTINUED | OUTPATIENT
Start: 2025-05-31 | End: 2025-05-31 | Stop reason: HOSPADM

## 2025-05-31 RX ORDER — BUPIVACAINE HYDROCHLORIDE AND EPINEPHRINE 5; 5 MG/ML; UG/ML
INJECTION, SOLUTION EPIDURAL; INTRACAUDAL; PERINEURAL
Status: DISCONTINUED | OUTPATIENT
Start: 2025-05-31 | End: 2025-05-31 | Stop reason: HOSPADM

## 2025-05-31 RX ORDER — SODIUM CHLORIDE 9 MG/ML
INJECTION, SOLUTION INTRAVENOUS
Status: ACTIVE
Start: 2025-05-31 | End: 2025-06-01

## 2025-05-31 RX ORDER — OXYCODONE HCL 5 MG/5 ML
2.5 SOLUTION, ORAL ORAL
Status: DISCONTINUED | OUTPATIENT
Start: 2025-05-31 | End: 2025-05-31 | Stop reason: HOSPADM

## 2025-05-31 RX ORDER — LABETALOL HYDROCHLORIDE 5 MG/ML
5 INJECTION, SOLUTION INTRAVENOUS
Status: DISCONTINUED | OUTPATIENT
Start: 2025-05-31 | End: 2025-05-31 | Stop reason: HOSPADM

## 2025-05-31 RX ADMIN — MAGNESIUM SULFATE HEPTAHYDRATE 2 G: 2 INJECTION, SOLUTION INTRAVENOUS at 11:53

## 2025-05-31 RX ADMIN — LATANOPROST 1 DROP: 50 SOLUTION OPHTHALMIC at 22:15

## 2025-05-31 RX ADMIN — SUGAMMADEX 200 MG: 100 INJECTION, SOLUTION INTRAVENOUS at 12:47

## 2025-05-31 RX ADMIN — LIDOCAINE HYDROCHLORIDE 100 MG: 20 INJECTION, SOLUTION EPIDURAL; INFILTRATION; INTRACAUDAL; PERINEURAL at 11:43

## 2025-05-31 RX ADMIN — HEPARIN SODIUM 5000 UNITS: 5000 INJECTION, SOLUTION INTRAVENOUS; SUBCUTANEOUS at 22:15

## 2025-05-31 RX ADMIN — HEPARIN SODIUM 5000 UNITS: 5000 INJECTION, SOLUTION INTRAVENOUS; SUBCUTANEOUS at 05:36

## 2025-05-31 RX ADMIN — PROPOFOL 100 MG: 10 INJECTION, EMULSION INTRAVENOUS at 11:50

## 2025-05-31 RX ADMIN — FENTANYL CITRATE 25 MCG: 50 INJECTION, SOLUTION INTRAMUSCULAR; INTRAVENOUS at 12:10

## 2025-05-31 RX ADMIN — BUPIVACAINE HYDROCHLORIDE AND EPINEPHRINE BITARTRATE 30 ML: 5; .0091 INJECTION, SOLUTION EPIDURAL; INTRACAUDAL; PERINEURAL at 12:08

## 2025-05-31 RX ADMIN — PIPERACILLIN AND TAZOBACTAM 3.38 G: 3; .375 INJECTION, POWDER, FOR SOLUTION INTRAVENOUS at 12:00

## 2025-05-31 RX ADMIN — ONDANSETRON 4 MG: 2 INJECTION INTRAMUSCULAR; INTRAVENOUS at 16:52

## 2025-05-31 RX ADMIN — ONDANSETRON 8 MG: 2 INJECTION INTRAMUSCULAR; INTRAVENOUS at 12:47

## 2025-05-31 RX ADMIN — ROCURONIUM BROMIDE 20 MG: 10 INJECTION INTRAVENOUS at 11:53

## 2025-05-31 RX ADMIN — LIDOCAINE HYDROCHLORIDE 4 ML: 40 SOLUTION TOPICAL at 11:50

## 2025-05-31 RX ADMIN — ROCURONIUM BROMIDE 10 MG: 10 INJECTION INTRAVENOUS at 12:29

## 2025-05-31 RX ADMIN — KETOROLAC TROMETHAMINE 15 MG: 15 INJECTION, SOLUTION INTRAMUSCULAR; INTRAVENOUS at 12:47

## 2025-05-31 RX ADMIN — POTASSIUM CHLORIDE 20 MEQ: 1500 TABLET, EXTENDED RELEASE ORAL at 05:36

## 2025-05-31 RX ADMIN — PIPERACILLIN AND TAZOBACTAM 3.38 G: 3; .375 INJECTION, POWDER, FOR SOLUTION INTRAVENOUS at 04:34

## 2025-05-31 RX ADMIN — SUCCINYLCHOLINE CHLORIDE 80 MG: 20 INJECTION, SOLUTION INTRAMUSCULAR; INTRAVENOUS at 11:50

## 2025-05-31 RX ADMIN — SODIUM CHLORIDE: 9 INJECTION, SOLUTION INTRAVENOUS at 16:57

## 2025-05-31 RX ADMIN — INDOCYANINE GREEN AND WATER 2.5 MG: KIT at 11:35

## 2025-05-31 RX ADMIN — SODIUM CHLORIDE, POTASSIUM CHLORIDE, SODIUM LACTATE AND CALCIUM CHLORIDE: 600; 310; 30; 20 INJECTION, SOLUTION INTRAVENOUS at 11:41

## 2025-05-31 RX ADMIN — SENNOSIDES AND DOCUSATE SODIUM 2 TABLET: 50; 8.6 TABLET ORAL at 22:14

## 2025-05-31 RX ADMIN — DEXAMETHASONE SODIUM PHOSPHATE 8 MG: 4 INJECTION INTRA-ARTICULAR; INTRALESIONAL; INTRAMUSCULAR; INTRAVENOUS; SOFT TISSUE at 11:53

## 2025-05-31 RX ADMIN — PIPERACILLIN AND TAZOBACTAM 3.38 G: 3; .375 INJECTION, POWDER, FOR SOLUTION INTRAVENOUS at 22:13

## 2025-05-31 RX ADMIN — PROMETHAZINE HYDROCHLORIDE 25 MG: 25 TABLET ORAL at 18:00

## 2025-05-31 RX ADMIN — TIMOLOL MALEATE 1 DROP: 5 SOLUTION OPHTHALMIC at 05:36

## 2025-05-31 ASSESSMENT — COGNITIVE AND FUNCTIONAL STATUS - GENERAL
CLIMB 3 TO 5 STEPS WITH RAILING: A LITTLE
WALKING IN HOSPITAL ROOM: A LITTLE
MOBILITY SCORE: 22
SUGGESTED CMS G CODE MODIFIER DAILY ACTIVITY: CJ
HELP NEEDED FOR BATHING: A LITTLE
SUGGESTED CMS G CODE MODIFIER MOBILITY: CJ
DAILY ACTIVITIY SCORE: 22
DRESSING REGULAR LOWER BODY CLOTHING: A LITTLE

## 2025-05-31 ASSESSMENT — PAIN DESCRIPTION - PAIN TYPE
TYPE: SURGICAL PAIN
TYPE: ACUTE PAIN
TYPE: SURGICAL PAIN

## 2025-05-31 NOTE — PROGRESS NOTES
1915 Received report from day shift RN. Patient is awake and alert, resting in bed. A&O X4, room air. Unlabored respiration. Pt denies pain and discomfort. Care plan discussed including Pain management and ambulation. Call light within reach. Personal belongings within reach. No needs required at this time. Safety precautions in place.

## 2025-05-31 NOTE — CONSULTS
Thoracic & General Surgery Consultation      Date of Service  5/30/2025    Referring Physician  Loren Atkins M.D.    Consulting Physician  Gerald Finn M.D.    Reason for Consultation  Goals of pancreatitis    History of Presenting Illness  Mr. Jamal Dee is a 74 y.o. male who presented 5/28/2025 with abdominal pain and was found to have gallstone pancreatitis.  He underwent ERCP on 5/30/2025 and had a sphincterotomy and stone removal.  His pain is much better.  He is not nauseated.  He has never had abdominal surgery before.  He is not on any blood thinners.    Review of Systems  All other systems reviewed and negative except as noted above    Past Medical History   has a past medical history of Glaucoma and Hyperlipidemia.    Surgical History   has a past surgical history that includes cataract phaco with iol (5/2/2013); cataract phaco with iol (5/16/2013); and pr ercp,diagnostic (N/A, 5/30/2025).    Family History  family history includes Cancer (age of onset: 62) in his sister; Heart Attack in his father; No Known Problems in his brother, brother, brother, brother, maternal grandfather, maternal grandmother, mother, paternal grandfather, and paternal grandmother.    Social History   reports that he quit smoking about 41 years ago. His smoking use included cigarettes. He started smoking about 56 years ago. He has a 22.5 pack-year smoking history. He has never used smokeless tobacco. He reports current alcohol use of about 4.2 oz of alcohol per week. He reports that he does not use drugs.    Medications  Current Medications[1]    Allergies  Allergies[2]    Physical Exam  Temp:  [36.2 °C (97.2 °F)-37.7 °C (99.8 °F)] 36.8 °C (98.2 °F)  Pulse:  [60-89] 69  Resp:  [12-18] 16  BP: ()/(44-77) 111/64  SpO2:  [83 %-98 %] 94 %    GEN: Healthy appearing, well developed, no acute distress.  PSYCH: Alert and oriented x3. Normal  memory, mood, and affect.  HEENT     -Head: Normocephalic,  atraumatic     -Eyes: PERRL, No discharge or redness;     -Ears: External ears are normal.      -Nose: Normal nares.     -Throat: moist mucus membranes, good dentition    NECK: Supple, trachea midline with no masses.  CV: RRR, radial pulses 2+, brisk cap refill on hands  LUNGS: no labored breathing on room air, no wheezing  ABD: Soft, mild tenderness to mid abdomen, nondistended  SKIN: Warm, well perfused. No skin rashes or abnormal lesions.  MSK: strength 5/5 throughout. No deformities  EXT: No clubbing, cyanosis, or edema.  NEURO: No focal deficits      Fluids  Date 05/30/25 0700 - 05/31/25 0659   Shift 9352-8161 7411-2739 4240-9306 24 Hour Total   INTAKE   P.O.  240  240   I.V. 400   400   Shift Total 400 240  640   OUTPUT   Urine 125   125   Shift Total 125   125   Weight (kg) 73.7 73.7 73.7 73.7       Laboratory  Recent Labs     05/28/25  1450 05/29/25  0059   WBC 11.1* 10.0   RBC 4.15* 4.11*   HEMOGLOBIN 12.3* 12.3*   HEMATOCRIT 38.5* 38.6*   MCV 92.8 93.9   MCH 29.6 29.9   MCHC 31.9* 31.9*   RDW 50.1* 49.7   PLATELETCT 303 297   MPV 9.6 10.0     Recent Labs     05/28/25  1450 05/29/25  0059 05/30/25  0046   SODIUM 141 140 136   POTASSIUM 3.5* 3.4* 3.6   CHLORIDE 103 106 106   CO2 26 20 20   GLUCOSE 94 75 83   BUN 12 10 7*   CREATININE 0.74 0.62 0.67   CALCIUM 9.3 8.3* 8.1*                     Imaging  [id number] DUCTS   Final Result      Digitized intraoperative radiograph is submitted for review. This examination is not for diagnostic purpose but for guidance during a surgical procedure. Please see the patient's chart for full procedural details.         INTERPRETING LOCATION: [address] [place] [address], [address]      DX-PORTABLE FLUOROSCOPY < 1 HOUR Reason For Exam: other   Final Result      Portable fluoroscopy utilized for 7.2 seconds.      INTERPRETING LOCATION: [facility], [address]      [id number]/[id number]   Final Result      1.  Choledocholithiasis with a 7 mm stone in the distal CBD.   2.  Dilated CBD  and intrahepatic bile ducts.   3.  Cholelithiasis. Dilated gallbladder with mild pericholecystic fluid.      CT-ABDOMEN-PELVIS WITH   Final Result      1.  Cholelithiasis.      2.  7 mm calcified stone appears to be present in the distal common bile duct. There is mild to moderate intrahepatic biliary ductal dilatation. There is also dilatation of the common bile duct proximal to the stone.      3.  No other acute abnormalities are identified in the abdomen or pelvis.         DX-CHEST-PORTABLE (1 VIEW)   Final Result      No evidence of acute cardiopulmonary process.          Assessment/Plan  This is a 74 y.o. male who presents with abdominal pain was found to have acute gallstone pancreatitis.  Intermittent ERCP on 5/30/2025 with sphincterotomy and drainage of stone and pus.  Will plan for robotic cholecystectomy tomorrow.  I discussed the risk and benefits with patient and he is in agreement to proceed.      Gerald Finn MD  Thoracic & General Surgeon  [facility]  [phone]           [1]   Current Facility-Administered Medications   Medication Dose Route Frequency Provider Last Rate Last Admin    lactated ringers infusion   Intravenous Continuous Bill Crain D.O. 100 mL/hr at 05/30/25 0338 New Bag at 05/30/25 0338    piperacillin-tazobactam (Zosyn) 3.375 g in  mL IVPB  3.375 g Intravenous Q8HRS Loren Atkins M.D.        NS infusion   Intravenous Continuous Loren Atkins M.D. 75 mL/hr at 05/30/25 1630 New Bag at 05/30/25 1630    oxyCODONE immediate-release (Roxicodone) tablet 5 mg  5 mg Oral Q3HRS PRN Loren Atkins M.D.        Or    oxyCODONE immediate release (Roxicodone) tablet 10 mg  10 mg Oral Q3HRS PRN Loren Atkins M.D.   10 mg at 05/30/25 0222    Or    HYDROmorphone (Dilaudid) injection 0.5 mg  0.5 mg Intravenous Q3HRS PRN Loren Atkins M.D.   0.5 mg at 05/30/25 0020    timolol (Timoptic) 0.5 % ophthalmic solution 1 Drop  1 Drop Both Eyes RAVEN Munson  MIRELA Hitchcock   1 Drop at 05/30/25 0507    heparin injection 5,000 Units  5,000 Units Subcutaneous Q8HRS Jeimy Hitchcock M.D.   5,000 Units at 05/30/25 0507    acetaminophen (Tylenol) tablet 650 mg  650 mg Oral Q6HRS PRN Jeimy Hitchcock M.D.        senna-docusate (Pericolace Or Senokot S) 8.6-50 MG per tablet 2 Tablet  2 Tablet Oral Q EVENING Jeimy Hitchcock M.D.   2 Tablet at 05/30/25 1721    And    polyethylene glycol/lytes (Miralax) Packet 1 Packet  1 Packet Oral QDAY PRN Jeimy Hitchcock M.D.        labetalol (Normodyne/Trandate) injection 10 mg  10 mg Intravenous Q4HRS PRN Jeimy Hitchcock M.D.        ondansetron (Zofran) syringe/vial injection 4 mg  4 mg Intravenous Q4HRS PRN Jeimy Hitchcock M.D.        Or    ondansetron (Zofran ODT) dispertab 4 mg  4 mg Oral Q4HRS PRN Jeimy Hitchcock M.D.        insulin lispro (HumaLOG,AdmeLOG) subcutaneous injection  1-6 Units Subcutaneous 4X/DAY ACHS Jeimy Hitchcock M.D.        And    dextrose 50 % (D50W) injection 25 g  25 g Intravenous Q15 MIN PRN Jeimy Hitchcock M.D.        potassium chloride SA (Kdur) tablet 20 mEq  20 mEq Oral BID Jeimy Hitchcock M.D.   20 mEq at 05/30/25 1721    latanoprost (Xalatan) 0.005 % ophthalmic solution 1 Drop  1 Drop Both Eyes QHS Jeimy Hitchcock M.D.   1 Drop at 05/29/25 2152   [2]   Allergies  Allergen Reactions    Other Environmental Unspecified     Goose down

## 2025-05-31 NOTE — PROGRESS NOTES
Received report from pacu, pt back to room via hospital bed. AAOx4, VSS on 2L oxygen. Pt denies pain or nausea. 4x lap sites to abdomen CDI. Wife at bedside. Pt updated on plan of care, safety precautions in place. Pt educated to call for assistance.

## 2025-05-31 NOTE — ANESTHESIA PROCEDURE NOTES
Airway    Date/Time: 5/31/2025 11:50 AM    Performed by: Kari Maravilla M.D.  Authorized by: Kari Maravilla M.D.    Location:  OR  Urgency:  Elective  Indications for Airway Management:  Anesthesia      Spontaneous Ventilation: absent    Sedation Level:  Deep  Preoxygenated: Yes    Patient Position:  Sniffing  Mask Difficulty Assessment:  0 - not attempted  Final Airway Type:  Endotracheal airway  Final Endotracheal Airway:  ETT  Cuffed: Yes    Technique Used for Successful ETT Placement:  Direct laryngoscopy    Insertion Site:  Oral  Blade Type:  Awais  Laryngoscope Blade/Videolaryngoscope Blade Size:  4  ETT Size (mm):  7.5  Measured from:  Teeth  ETT to Teeth (cm):  23  Placement Verified by: auscultation and capnometry    Cormack-Lehane Classification:  Grade I - full view of glottis  Number of Attempts at Approach:  1

## 2025-05-31 NOTE — PROGRESS NOTES
Hospital Medicine Daily Progress Note    Date of Service  5/31/2025    Chief Complaint  Jamal Dee is a 74 y.o. male admitted 5/28/2025 with nv, LUQ pain    Hospital Course  75 yo with hx of gallstones presented with n/v, pale stool, LUQ pain found to have pancreatitis with lipase in 3000s and elev bili c/f gallstone pancreatitis and biliary obstruction. MRCP w CBD stone, GI consulted. Sp ERCP by DR. Stevens with stone extraction and pus drainage. IV abx started. Gen Surge consulted.    Interval Problem Update  - awaiting lap edita  - bili down trending  - lipase down trending    I have discussed this patient's plan of care and discharge plan at IDT rounds today with Case Management, Nursing, Nursing leadership, and other members of the IDT team.    Consultants/Specialty  GI  [facility]    Code Status  Full Code    Disposition  The patient is not medically cleared for discharge to home or a post-acute facility.  Anticipate discharge to: home with close outpatient follow-up    I have placed the appropriate orders for post-discharge needs.    Review of Systems  Review of Systems   All other systems reviewed and are negative.       Physical Exam  Temp:  [35.9 °C (96.6 °F)-36.8 °C (98.2 °F)] 35.9 °C (96.6 °F)  Pulse:  [48-70] 55  Resp:  [14-20] 16  BP: ()/(50-76) 137/76  SpO2:  [92 %-100 %] 97 %    Physical Exam  General: NAD, resting comfortably  HEENT: PERRLA, EOMI  Cards: RRR  Pulm: normal respiratory effort, CTAB, no wheezes or rhonchi  Abdomen: LUQ pain  MSK: normal ROM of upper and lower extremities  Neuro: CN II-XII grossly intact, sensation/strength intact, AAOx3  Psych: Appropriate mood   Fluids    Intake/Output Summary (Last 24 hours) at 5/31/2025 1446  Last data filed at 5/31/2025 1357  Gross per 24 hour   Intake 1030 ml   Output 170 ml   Net 860 ml        Laboratory  Recent Labs     05/28/25  1450 05/29/25  0059 05/31/25  0044   WBC 11.1* 10.0 10.7   RBC 4.15* 4.11* 4.14*   HEMOGLOBIN 12.3* 12.3*  12.2*   HEMATOCRIT 38.5* 38.6* 38.0*   MCV 92.8 93.9 91.8   MCH 29.6 29.9 29.5   MCHC 31.9* 31.9* 32.1*   RDW 50.1* 49.7 49.0   PLATELETCT 303 297 200   MPV 9.6 10.0 10.1     Recent Labs     05/29/25  0059 05/30/25  0046 05/31/25  0044   SODIUM 140 136 138   POTASSIUM 3.4* 3.6 4.1   CHLORIDE 106 106 105   CO2 20 20 21   GLUCOSE 75 83 108*   BUN 10 7* 11   CREATININE 0.62 0.67 0.71   CALCIUM 8.3* 8.1* 8.3*                   Imaging  [id number] DUCTS   Final Result      Digitized intraoperative radiograph is submitted for review. This examination is not for diagnostic purpose but for guidance during a surgical procedure. Please see the patient's chart for full procedural details.         INTERPRETING LOCATION: [address] [place] [address], [address]      DX-PORTABLE FLUOROSCOPY < 1 HOUR Reason For Exam: other   Final Result      Portable fluoroscopy utilized for 7.2 seconds.      INTERPRETING LOCATION: [address] [place] [address], [address]      [id number]   Final Result      1.  Choledocholithiasis with a 7 mm stone in the distal CBD.   2.  Dilated CBD and intrahepatic bile ducts.   3.  Cholelithiasis. Dilated gallbladder with mild pericholecystic fluid.      CT-ABDOMEN-PELVIS WITH   Final Result      1.  Cholelithiasis.      2.  7 mm calcified stone appears to be present in the distal common bile duct. There is mild to moderate intrahepatic biliary ductal dilatation. There is also dilatation of the common bile duct proximal to the stone.      3.  No other acute abnormalities are identified in the abdomen or pelvis.         DX-CHEST-PORTABLE (1 VIEW)   Final Result      No evidence of acute cardiopulmonary process.           Assessment/Plan  * Choledocholithiasis  Assessment & Plan  Distal stone in CBD  Sp ERCP with stone extraction, pus drainage, I discussed findingds with DR. Rodney Carlisle IV Zosyharrison  Sp lap edita 5/31    Hypoxia  Assessment & Plan  Post op  Encourage IS    Cholangitis  Assessment & Plan  CBD dilation 2/2  stone  Pus evident on ERCP  IV zosyn post op, total 5 days abx now that source control  5/31 sp lap edita by     Elevated liver enzymes- (present on admission)  Assessment & Plan  Liver enzymes are elevated secondary to the common bile duct obstruction.  Bili 2.6 now down trending  Fluid resuscitation has been ordered  MRCP with distal CBD stone  I consulted LINDA Marie  LFTS up today bili up to 4.0  Dr. Stevens performed ERCP sp stone removal, pus drainage  LFTS improving    Hypokalemia- (present on admission)  Assessment & Plan  Resolved  Check cmp in am    Normochromic normocytic anemia- (present on admission)  Assessment & Plan  noted    Leukocytosis- (present on admission)  Assessment & Plan  Pus drainage per ERCP report  Cw iv zosyn    Gallstone pancreatitis- (present on admission)  Assessment & Plan  Nv pale stools for 10 days  Lipase 3000s  Likely 2/2 gallstone pancreatitis  Pancreatic duct is nl on MRCP  IVF 200cc/hr for today  Bladder scan as not as much UOP  Repeat lipase in AM  Repeat CMP in AM  IV dilaudid 0.5mg x 1 today and also oxy 10mg x 2  I consulted [facility] - sp ERCP  Lipase down trending    Cholelithiasis- (present on admission)  Assessment & Plan  I anticipate will need lap edita during this stay after ercp  I consulted DR. Finn Gen Surgery sp lap edita 5/31    BPH (benign prostatic hypertrophy) with urinary retention- (present on admission)  Assessment & Plan  Cw home meds    Mixed hyperlipidemia- (present on admission)  Assessment & Plan  Cw statin    DELMA on CPAP- (present on admission)  Assessment & Plan  Cpap qhs         VTE prophylaxis:  [facility]    I have performed a physical exam and reviewed and updated ROS and Plan today (5/31/2025). In review of yesterday's note (5/30/2025), there are no changes except as documented above.    I spent 40 minutes providing care for this patient.  This included face-to-face interview, physical examination.  Review of lab work including CBC,  BMP, MRI Discussion with multidisciplinary team including case management, nursing staff and pharmacy and GI

## 2025-05-31 NOTE — ANESTHESIA TIME REPORT
Anesthesia Start and Stop Event Times       Date Time Event    5/31/2025 1058 Ready for Procedure     1141 Anesthesia Start     1307 Anesthesia Stop          Responsible Staff  05/31/25      Name Role Begin End    Kari Maravilla M.D. Anesth 1141 1307          Overtime Reason:  no overtime (within assigned shift)    Comments:

## 2025-05-31 NOTE — PROGRESS NOTES
4 Eyes Skin Assessment Completed by ANTONINO Harvey and ANTONINO Leung.    Skin assessment is primarily focused on high risk bony prominences. Pay special attention to skin beneath and around medical devices, high risk bony prominences, skin to skin areas and areas where the patient lacks sensation to feel pain and areas where the patient previously had breakdown.     Head (Occipital):  WDL   Ears (Under Medical Devices): WDL   Nose (Under Medical Devices): WDL   Mouth:  WDL   Neck: WDL   Breast/Chest:  WDL   Shoulder Blades:  WDL   Spine:   WDL   (R) Arm/Elbow/Hand: WDL   (L) Arm/Elbow/Hand: WDL   Abdomen: Incision- dermabond    Pannus/Groin:  WDL   Sacrum/Coccyx:   WDL   (R) Ischial Tuberosity (Sit Bones):  WDL   (L) Ischial Tuberosity (Sit Bones):  WDL   (R) Leg:  WDL   (L) Leg:  WDL   (R) Heel:  WDL   (R) Foot/Toe: WDL   (L) Heel: WDL   (L) Foot/Toe:  WDL       DEVICES IN USE:   Respiratory Devices:  Nasal cannula  Feeding Devices:  N/A   Lines & BP Monitoring Devices:  Peripheral IV, BP cuff, and Pulse ox    Orthopedic Devices:  N/A  Miscellaneous Devices:  N/A    PROTOCOL INTERVENTIONS:   Standard/Trauma Bed:  Already in place  Nasal Cannula with Gray Foams:  Already in place    WOUND PHOTOS:   N/A no wounds identified    WOUND CONSULT:   N/A, no advanced wound care needs identified

## 2025-05-31 NOTE — ANESTHESIA PREPROCEDURE EVALUATION
Case: [id number] Date/Time: 05/31/25 1120    Procedure: CHOLECYSTECTOMY, ROBOT-ASSISTED, USING DA GILLES XI    Anesthesia type: General    Location:  OR  / SURGERY [facility]    Surgeons: Gerald Finn M.D.          73yo M here for robotic lap edita    No issues with anesthesia yesterday for his ERCP    Uses CPAP 8 qhs      Relevant Problems   ANESTHESIA   (positive) DELMA on CPAP      CARDIAC   (positive) Atherosclerosis of aorta (HCC)      Other   (positive) Cholangitis   (positive) Choledocholithiasis   (positive) Cholelithiasis   (positive) Former smoker   (positive) Gallstone pancreatitis       Physical Exam    Airway   Mallampati: III  TM distance: >3 FB  Neck ROM: full       Cardiovascular - normal exam  Rhythm: regular  Rate: normal    (-) murmur     Dental - normal exam           Pulmonary - normal examBreath sounds clear to auscultation     Abdominal    Neurological - normal exam                   Anesthesia Plan    ASA 2       Plan - general       Airway plan will be ETT          Induction: intravenous    Postoperative Plan: Postoperative administration of opioids is intended.    Pertinent diagnostic labs and testing reviewed    Informed Consent:    Anesthetic plan and risks discussed with patient.    Use of blood products discussed with: patient whom consented to blood products.

## 2025-05-31 NOTE — OP REPORT
Operative Report    Date: 5/31/2025    Surgeon: Gerald Finn M.D.    Assistant: none    Preoperative Diagnosis: choledocholithiasis, gallstone pancreatitis    Postoperative Diagnosis: Same    Procedure Performed: Laparoscopic cholecystectomy with intraoperative cholangiogram using indocyanine green    Findings: Large distended gallbladder with omental adhesions to the gallbladder.  The cystic duct was large.  This was identified using indocyanine green cholangiography.  The cystic duct common duct anatomy was able to be visualized using indocyanine green cholangiography.  There were multiple cystic lesions throughout the liver      Procedure in detail: The patient was brought to the operating room and was placed in the supine position where general endotracheal anesthesia was induced. SCDs were in place and functioning. Preoperative antibiotics were given before incision time. The patient's abdomen was prepped and draped in the usual sterile fashion. A final timeout was performed identifying the correct patient and procedure to be performed.     A stab incision was made in left upper quadrant and a Veress needle inserted into the abdomen.  The peritoneal cavity was insufflated to 15 mmHg.  A trocar was then inserted.  On inspection with camera there was no injury from the Veress needle or trocar placement.  There were some adhesions to the anterior abdominal wall in the midline which were taken down bluntly with a camera.  I then inserted 3 more 8 mm robotic ports,  in the left abdomen, right abdomen and right lateral abdomen.     The gallbladder was identified, it appeared large and distended.  There were adhesions to the gallbladder from the omentum.. The gallbladder was retracted cephalad and caudally using gallbladder graspers. Using the electrocautery, we were able to peel the overlying peritoneum off the cystic duct, and circumferentially dissect it. We used electrocautery to futher remove the peritoneum off  the gallbladder. We then were able to further dissect Calot's triangle until we identified the cystic artery, which was circumferrentially dissected.      Using hem-o-lock clips, we then doubly clipped the cystic duct distally and divided it. We then clipped the cystic artery and divided it.Then, using electrocautery, we removed the gallbladder from the liver bed. After ensuring gallbladder bed hemostasis with cautery, we removed the gallbladder and placed it in an endocatch bag, and removed it from the epigastric port site.     The right upper quadrant was suctioned to remove blood. We inspected the cystic duct and artery stumps, and found them to be intact. The liver bed was hemostatic.     The trocars were removed under direct visualization.     The fascia was closed with 0 Vicryl suture at the left abdomen port. The skin of all incisions were closed with monocryl suture. Dermabond was then applied to all incisions.     All sponge, needle, and instrument counts were reported as correct x2 at the end of the procedure. The patient tolerated the procedure well and left the operating room for the recovery room in stable and satisfactory condition.       Estimated Blood Loss: 10 mL    Specimens: gallbladder for permanent pathology    Disposition: PACU then floor    Gerald Finn MD  Thoracic & General Surgeon  [facility]  [phone]

## 2025-05-31 NOTE — OR NURSING
1120 : Pt allergies and NPO status verified. Pt verbalizes understanding of pain scale, expected course of stay, and plan of care. Surgical site/procedure verified with pt and MD. Triple aim completed. New IV access established. All questions answered. Bed in lowest position, call light within reach.

## 2025-05-31 NOTE — CARE PLAN
The patient is Stable - Low risk of patient condition declining or worsening    Shift Goals  Clinical Goals: Patient's pain managed to 4/10 or less post prn meds per MAR, remain NPO with sips until procedure.  Patient Goals: Get better son.  Family Goals: n/a    Progress made toward(s) clinical / shift goals:  Patient remained NPO until procedure, pain managed to 4/10 or less post prn meds per MAR.    Patient is not progressing towards the following goals:

## 2025-05-31 NOTE — CARE PLAN
The patient is Stable - Low risk of patient condition declining or worsening    Shift Goals  Clinical Goals: OR today, pain tolerable at 5/10 or less, comfort  Patient Goals: comfort, surgery done, rest  Family Goals: n/a    Progress made toward(s) clinical / shift goals:  lap edita completed, pt doing well postop- denies pain or nausea. Progressing on goals.     Patient is not progressing towards the following goals:

## 2025-05-31 NOTE — CARE PLAN
The patient is Stable - Low risk of patient condition declining or worsening    Shift Goals  Clinical Goals: Pain management.Monitor lab and V/S. Free from falls.  Patient Goals: Prepare forsurgery tomorrow  Family Goals: n/a    Progress made toward(s) clinical / shift goals:  Pt denied any pain and nausea. V/S stable. No critical lab reported. Pt ambulated 400 ft in hallway without walker and assistance. NPO/sip maintained throughout the shift. Pt remains free from falls.     Patient is not progressing towards the following goals:

## 2025-05-31 NOTE — ANESTHESIA POSTPROCEDURE EVALUATION
Patient: Jamal Dee    Procedure Summary       Date: 05/31/25 Room / Location:  OR  / SURGERY [facility]    Anesthesia Start: 1141 Anesthesia Stop: 1307    Procedure: CHOLECYSTECTOMY, ROBOT-ASSISTED, USING DA GILLES XI (Abdomen) Diagnosis: (choledocholithiasis, gallstone pancreatitis)    Surgeons: Gerald Finn M.D. Responsible Provider: Kari Maravilla M.D.    Anesthesia Type: general ASA Status: 2            Final Anesthesia Type: general  Last vitals  BP   Blood Pressure : 125/71    Temp   36.1 °C (97 °F)    Pulse   (!) 51   Resp   17    SpO2   97 %      Anesthesia Post Evaluation    Patient location during evaluation: PACU  Patient participation: complete - patient participated  Level of consciousness: awake and alert    Airway patency: patent  Anesthetic complications: no  Cardiovascular status: hemodynamically stable  Respiratory status: acceptable  Hydration status: euvolemic    PONV: none          No notable events documented.     Nurse Pain Score: 0 (NPRS)

## 2025-05-31 NOTE — OR NURSING
1300- Patient arrived to PACU. Received report from anesthesia and OR nurse. x4 lap sites w/ dermabond in place to abdomen CDI with good capillary refill. Cold pack applied to abdomen.    1319- Patient's wife Mayte updated via phone call. Patient's wife verbalized she is coming to the hospital and will bring patients home CPAP machine with her. All questions answered.     1330- Tolerating small sips of water. Patient denies pain and nausea.     1348- Report given to Wes MUÑIZ. All questions answered.     1400- Patient meets criteria to transfer out of Recovery.     1415- Patient transport at bedside. Pt transferred to room 2202 via hospital bed on 2L oxygen. Cold pack transferred with patient.

## 2025-05-31 NOTE — PROGRESS NOTES
Received report from night shift RN, assumed care of pt at 0715. AAOx4, VSS. Pt denies pain or nausea. Pt updated on plan of care for the day. OR scheduled for 1135. Safety precautions in place, pt educated to call for assistance.   0745- report given to preop RN.   1039- pt to preop via bed with transport.

## 2025-06-01 ENCOUNTER — PHARMACY VISIT (OUTPATIENT)
Dept: PHARMACY | Facility: MEDICAL CENTER | Age: 75
End: 2025-06-01
Payer: COMMERCIAL

## 2025-06-01 VITALS
SYSTOLIC BLOOD PRESSURE: 117 MMHG | HEIGHT: 67 IN | BODY MASS INDEX: 25.5 KG/M2 | RESPIRATION RATE: 17 BRPM | OXYGEN SATURATION: 94 % | WEIGHT: 162.48 LBS | HEART RATE: 52 BPM | DIASTOLIC BLOOD PRESSURE: 69 MMHG | TEMPERATURE: 98.3 F

## 2025-06-01 LAB
ALBUMIN SERPL BCP-MCNC: 2.4 G/DL (ref 3.2–4.9)
ALBUMIN/GLOB SERPL: 0.8 G/DL
ALP SERPL-CCNC: 494 U/L (ref 30–99)
ALT SERPL-CCNC: 91 U/L (ref 2–50)
ANION GAP SERPL CALC-SCNC: 10 MMOL/L (ref 7–16)
AST SERPL-CCNC: 59 U/L (ref 12–45)
BILIRUB SERPL-MCNC: 1.1 MG/DL (ref 0.1–1.5)
BUN SERPL-MCNC: 13 MG/DL (ref 8–22)
CALCIUM ALBUM COR SERPL-MCNC: 8.9 MG/DL (ref 8.5–10.5)
CALCIUM SERPL-MCNC: 7.6 MG/DL (ref 8.5–10.5)
CHLORIDE SERPL-SCNC: 108 MMOL/L (ref 96–112)
CO2 SERPL-SCNC: 20 MMOL/L (ref 20–33)
CREAT SERPL-MCNC: 0.65 MG/DL (ref 0.5–1.4)
ERYTHROCYTE [DISTWIDTH] IN BLOOD BY AUTOMATED COUNT: 48.6 FL (ref 35.9–50)
GFR SERPLBLD CREATININE-BSD FMLA CKD-EPI: 98 ML/MIN/1.73 M 2
GLOBULIN SER CALC-MCNC: 2.9 G/DL (ref 1.9–3.5)
GLUCOSE BLD STRIP.AUTO-MCNC: 92 MG/DL (ref 65–99)
GLUCOSE SERPL-MCNC: 101 MG/DL (ref 65–99)
HCT VFR BLD AUTO: 38.9 % (ref 42–52)
HGB BLD-MCNC: 12.3 G/DL (ref 14–18)
MCH RBC QN AUTO: 28.8 PG (ref 27–33)
MCHC RBC AUTO-ENTMCNC: 31.6 G/DL (ref 32.3–36.5)
MCV RBC AUTO: 91.1 FL (ref 81.4–97.8)
PATHOLOGY CONSULT NOTE: NORMAL
PLATELET # BLD AUTO: 243 K/UL (ref 164–446)
PMV BLD AUTO: 10.1 FL (ref 9–12.9)
POTASSIUM SERPL-SCNC: 4.3 MMOL/L (ref 3.6–5.5)
PROT SERPL-MCNC: 5.3 G/DL (ref 6–8.2)
RBC # BLD AUTO: 4.27 M/UL (ref 4.7–6.1)
SODIUM SERPL-SCNC: 138 MMOL/L (ref 135–145)
WBC # BLD AUTO: 11.9 K/UL (ref 4.8–10.8)

## 2025-06-01 PROCEDURE — 80053 COMPREHEN METABOLIC PANEL: CPT

## 2025-06-01 PROCEDURE — 85027 COMPLETE CBC AUTOMATED: CPT

## 2025-06-01 PROCEDURE — 99239 HOSP IP/OBS DSCHRG MGMT >30: CPT | Performed by: INTERNAL MEDICINE

## 2025-06-01 PROCEDURE — 82962 GLUCOSE BLOOD TEST: CPT

## 2025-06-01 PROCEDURE — 94760 N-INVAS EAR/PLS OXIMETRY 1: CPT

## 2025-06-01 PROCEDURE — 88304 TISSUE EXAM BY PATHOLOGIST: CPT | Performed by: PATHOLOGY

## 2025-06-01 PROCEDURE — RXMED WILLOW AMBULATORY MEDICATION CHARGE: Performed by: INTERNAL MEDICINE

## 2025-06-01 PROCEDURE — 700111 HCHG RX REV CODE 636 W/ 250 OVERRIDE (IP): Mod: JZ | Performed by: INTERNAL MEDICINE

## 2025-06-01 PROCEDURE — 36415 COLL VENOUS BLD VENIPUNCTURE: CPT

## 2025-06-01 PROCEDURE — 700111 HCHG RX REV CODE 636 W/ 250 OVERRIDE (IP): Performed by: HOSPITALIST

## 2025-06-01 PROCEDURE — 88304 TISSUE EXAM BY PATHOLOGIST: CPT | Mod: 26 | Performed by: PATHOLOGY

## 2025-06-01 PROCEDURE — 700105 HCHG RX REV CODE 258: Performed by: INTERNAL MEDICINE

## 2025-06-01 RX ORDER — ONDANSETRON 4 MG/1
4 TABLET, ORALLY DISINTEGRATING ORAL EVERY 4 HOURS PRN
Qty: 10 TABLET | Refills: 0 | Status: SHIPPED | OUTPATIENT
Start: 2025-06-01 | End: 2025-06-06

## 2025-06-01 RX ADMIN — PIPERACILLIN AND TAZOBACTAM 3.38 G: 3; .375 INJECTION, POWDER, FOR SOLUTION INTRAVENOUS at 04:39

## 2025-06-01 RX ADMIN — SODIUM CHLORIDE: 9 INJECTION, SOLUTION INTRAVENOUS at 05:45

## 2025-06-01 RX ADMIN — TIMOLOL MALEATE 1 DROP: 5 SOLUTION OPHTHALMIC at 05:46

## 2025-06-01 RX ADMIN — HEPARIN SODIUM 5000 UNITS: 5000 INJECTION, SOLUTION INTRAVENOUS; SUBCUTANEOUS at 05:46

## 2025-06-01 ASSESSMENT — PAIN DESCRIPTION - PAIN TYPE: TYPE: ACUTE PAIN;SURGICAL PAIN

## 2025-06-01 NOTE — CARE PLAN
The patient is Stable - Low risk of patient condition declining or worsening    Shift Goals  Clinical Goals: Pt will ambulate in hallway, tolerate full iquid diet. Monitor lab and v/s. Free from falls.  Patient Goals: Rest and sleep without nausea  Family Goals: n/a    Progress made toward(s) clinical / shift goals:  Pt ambulated to bathroom twice, refused to ambulate in hallway. Pt denied nausea and pain. Tolerated full liquid diet and IV abx. V/S stable. No critical lab reported. Pt remains free from falls during the shift.     Patient is not progressing towards the following goals:

## 2025-06-01 NOTE — PROGRESS NOTES
0700 Received report from Night shift nurse  0755 Performed assessment  Pt is A&Ox4, complains of 4/10 pain; declines medicinal intervention; cold pack applied, Updated on POC: mobilize, manage pain, D/C home  Call light within reach, hourly rounding in place, bed in lowest position, bed alarm refused, pt educated, charge notified.    1315: Called [facility] as his meds were delivered and they were not supposed to be. Previous voalte communication was that pt wanted to pick them up. I asked if that would interfere with them picking up at [facility], they said it would. I asked if they could reverse this so that the patient can  at [facility]. They will put a request for the  to  at the 5pm drop off time. Called pharmacy here and they said that they would store the medications in a safe spot until then. Charge notified

## 2025-06-01 NOTE — DISCHARGE SUMMARY
Hospital Medicine Discharge Note     Admit Date:  5/28/2025       Discharge Date:   6/1/2025  LOS: 4 days     Primary Care Provider:    Rosina Agosto M.D.    Attending Physician:  Loren Atkins M.D.     Discharge Diagnoses:   Principal Problem:    Choledocholithiasis  Active Problems:    DELMA on CPAP    Mixed hyperlipidemia    BPH (benign prostatic hypertrophy) with urinary retention    Cholelithiasis    Gallstone pancreatitis    Leukocytosis    Normochromic normocytic anemia    Hypokalemia    Elevated liver enzymes    Cholangitis    Hypoxia        Hospital Summary (Brief Narrative):         75 yo with hx of gallstones presented with n/v, pale stool, LUQ pain found to have pancreatitis with lipase in 3000s and elev bili c/f gallstone pancreatitis and biliary obstruction. MRCP w distal CBD stone. DHA consulted sp ERCP by Dr. Stevens with stone extraction and pus drainage on 5/30. IV abx started given presence of pus, patient had no systemic signs indicative of infection. [facility] consulted and Dr. Finn performed elective lap edita on 5/31. Pt did well post op, able to tolerate diet and have a bowel movement.  Given Augmenti to finish total 5 day abx course post operatively.       Disposition:   Discharge home    Condition:  Stable    Activity:   As tolerated     Diet:   Low fat diet    Discharge Medications:           Medication List        START taking these medications        Instructions   amoxicillin-clavulanate 875-125 MG Tabs  Commonly known as: Augmentin   Take 1 Tablet by mouth 2 times a day for 3 days.  Dose: 1 Tablet     ondansetron 4 MG Tbdp  Commonly known as: Zofran ODT   Take 1 Tablet by mouth every four hours as needed for Nausea/Vomiting (give PO if IV route is unavailable.).  Dose: 4 mg     oxyCODONE immediate-release 5 MG Tabs  Commonly known as: Roxicodone   Take 1 Tablet by mouth every 6 hours as needed for Severe Pain (mild pain) for up to 7 days.  Dose: 5 mg            CHANGE how you take these  medications        Instructions   atorvastatin 40 MG Tabs  What changed:   how much to take  how to take this  when to take this  Commonly known as: Lipitor   TAKE ONE TABLET BY MOUTH AT BEDTIME     dutasteride 0.5 MG capsule  What changed:   how much to take  how to take this  when to take this  Commonly known as: Avodart   TAKE ONE CAPSULE BY MOUTH ONE TIME DAILY            CONTINUE taking these medications        Instructions   bimatoprost 0.01 % Soln  Commonly known as: Lumigan   Administer 1 Drop into both eyes at bedtime.  Dose: 1 Drop     famciclovir 125 MG Tabs  Commonly known as: Famvir   Take 1 Tablet by mouth 3 times a day as needed (recurrent herpes outbreaks).  Dose: 125 mg     fish oil 1000 MG Caps capsule   Take 1,000 mg by mouth every day.  Dose: 1,000 mg     metFORMIN  MG Tb24  Commonly known as: Glucophage XR   Take 1 Tablet by mouth 2 times a day.  Dose: 500 mg     tamsulosin 0.4 MG capsule  Commonly known as: Flomax   Take 1 Capsule by mouth every day.  Dose: 0.4 mg     timolol 0.5 % Soln  Commonly known as: Timoptic   Administer 1 Drop into both eyes every morning.  Dose: 1 Drop            STOP taking these medications      triamcinolone acetonide 0.1 % Oint  Commonly known as: Kenalog                Follow up appointment details :      I encouraged him to call his PCP to confirm follow up after discharge.    Future Appointments   Date Time Provider Department [place]   7/15/2025 10:15 AM LAB ESTER Wade   7/29/2025  3:00 PM MIRELA Barbosa [facility]         Consultants:      DHA  [facility]    Studies:    Imaging/ Testing:      [id number] DUCTS   Final Result      Digitized intraoperative radiograph is submitted for review. This examination is not for diagnostic purpose but for guidance during a surgical procedure. Please see the patient's chart for full procedural details.         INTERPRETING LOCATION: [address] [place] [address], [id number]      DX-PORTABLE FLUOROSCOPY < 1 HOUR Reason For  Exam: other   Final Result      Portable fluoroscopy utilized for 7.2 seconds.      INTERPRETING LOCATION: [address] [facility], [place], [address]      [id number]   Final Result      1.  Choledocholithiasis with a 7 mm stone in the distal CBD.   2.  Dilated CBD and intrahepatic bile ducts.   3.  Cholelithiasis. Dilated gallbladder with mild pericholecystic fluid.      CT-ABDOMEN-PELVIS WITH   Final Result      1.  Cholelithiasis.      2.  7 mm calcified stone appears to be present in the distal common bile duct. There is mild to moderate intrahepatic biliary ductal dilatation. There is also dilatation of the common bile duct proximal to the stone.      3.  No other acute abnormalities are identified in the abdomen or pelvis.         DX-CHEST-PORTABLE (1 VIEW)   Final Result      No evidence of acute cardiopulmonary process.          Procedures:        As above      Instructions:      The were given instructions to return to the ER if patient's condition worsens      Time Spent on Discharge:     Discharge instructions were discussed with the patient at bedside. Patient  expressed understanding and agreed to comply with all discharge instructions.    37 minutes were spent in the discharge planning and management of this  patient, including more than 50% of the time spent face to face in   Counseling.

## 2025-06-01 NOTE — PROGRESS NOTES
1911 Received report from day shift RN. Patient is awake and alert, resting in bed. A&O X4, room air. Unlabored respiration. Surgical lapa site x4 to abd open to air, dermabond. Care plan discussed including ambulation and pain/ nausea management. Call light within reach. Personal belongings within reach. No needs required at this time. Safety precautions in place.

## 2025-06-02 ENCOUNTER — PATIENT OUTREACH (OUTPATIENT)
Dept: MEDICAL GROUP | Facility: MEDICAL CENTER | Age: 75
End: 2025-06-02
Payer: MEDICARE

## 2025-06-02 ENCOUNTER — DOCUMENTATION (OUTPATIENT)
Dept: HEALTH INFORMATION MANAGEMENT | Facility: OTHER | Age: 75
End: 2025-06-02
Payer: MEDICARE

## 2025-06-02 NOTE — PROGRESS NOTES
Transitional Care Management  TCM Outreach Date and Time: Filed (6/2/2025 10:33 AM)    Discharge Questions  Actual Discharge Date: 06/01/25  Now that you are home, how are you feeling?: Good (Pt reports being sore, but oxycodone helps; took one this morning.)  Did you receive any new prescriptions?: Yes (Start: Augmentin, Zofran, Roxicodone     Stop: Kenalog)  Were you able to get them filled?: Yes  Meds to Bed or Pharmacy filled?: Meds to Bed  Do you have any questions about your current medications or new medications (Review Med Rec)?: No  Did you have any durable medical equipment ordered?: No  Do you have a follow up appointment scheduled with your PCP?: Yes  Appointment Date: 06/06/25  Appointment Time: 0920  Any issues or paperwork you wish to discuss with your PCP?: Yes (Please specify) (Hosptial stay)  Are you (patient) able to get to the appointment?: Yes  If Home Health was ordered, have they contacted you (Patient): Not Applicable  Did you have enough support after your last discharge?: Yes  Does this patient qualify for the CCM program?: Yes (Refer pt to Marlen Abad)    Transitional Care  Number of attempts made to contact patient: 1  Current or previous attempts completed within two business days of discharge? : Yes  Provided education regarding treatment plan, medications, self-management, ADLs?: Yes (ED precautions reviewed; if pain become unmanagable with Oxycodone pt should return to ED)  Has patient completed an Advanced Directive?: No  Is there anything else I can help you with?: No    Discharge Summary  Chief Complaint: Chest Pain  Admitting Diagnosis: Acute gallstone pancreatitis  Discharge Diagnosis: Choledocholithiasis      Pt denies other questions or concerns at this time.    Kellen Mendez R.N.

## 2025-06-06 ENCOUNTER — OFFICE VISIT (OUTPATIENT)
Dept: MEDICAL GROUP | Facility: MEDICAL CENTER | Age: 75
End: 2025-06-06
Payer: MEDICARE

## 2025-06-06 VITALS
HEIGHT: 67 IN | DIASTOLIC BLOOD PRESSURE: 60 MMHG | SYSTOLIC BLOOD PRESSURE: 122 MMHG | OXYGEN SATURATION: 97 % | TEMPERATURE: 97.6 F | BODY MASS INDEX: 25.26 KG/M2 | WEIGHT: 160.94 LBS | RESPIRATION RATE: 14 BRPM | HEART RATE: 54 BPM

## 2025-06-06 DIAGNOSIS — K80.01 CALCULUS OF GALLBLADDER WITH ACUTE CHOLECYSTITIS AND OBSTRUCTION: ICD-10-CM

## 2025-06-06 DIAGNOSIS — D72.829 LEUKOCYTOSIS, UNSPECIFIED TYPE: ICD-10-CM

## 2025-06-06 DIAGNOSIS — E61.1 LOW SERUM IRON: ICD-10-CM

## 2025-06-06 DIAGNOSIS — R74.8 ELEVATED LIVER ENZYMES: ICD-10-CM

## 2025-06-06 DIAGNOSIS — K85.10 GALLSTONE PANCREATITIS: ICD-10-CM

## 2025-06-06 DIAGNOSIS — E87.6 HYPOKALEMIA: ICD-10-CM

## 2025-06-06 DIAGNOSIS — K83.09 CHOLANGITIS: ICD-10-CM

## 2025-06-06 DIAGNOSIS — Z09 HOSPITAL DISCHARGE FOLLOW-UP: Primary | ICD-10-CM

## 2025-06-06 DIAGNOSIS — K80.50 CHOLEDOCHOLITHIASIS: ICD-10-CM

## 2025-06-06 DIAGNOSIS — D64.9 NORMOCHROMIC NORMOCYTIC ANEMIA: ICD-10-CM

## 2025-06-06 DIAGNOSIS — L30.9 HAND DERMATITIS: ICD-10-CM

## 2025-06-06 PROBLEM — K80.20 CHOLELITHIASIS: Status: RESOLVED | Noted: 2023-07-20 | Resolved: 2025-06-06

## 2025-06-06 PROBLEM — R09.02 HYPOXIA: Status: RESOLVED | Noted: 2025-05-31 | Resolved: 2025-06-06

## 2025-06-06 RX ORDER — IRON,CARBONYL/ASCORBIC ACID 65MG-125MG
1 TABLET ORAL DAILY
Qty: 90 TABLET | Refills: 0 | Status: SHIPPED | OUTPATIENT
Start: 2025-06-06

## 2025-06-06 RX ORDER — ONDANSETRON 4 MG/1
4 TABLET, ORALLY DISINTEGRATING ORAL EVERY 8 HOURS PRN
Qty: 30 TABLET | Refills: 0 | Status: SHIPPED | OUTPATIENT
Start: 2025-06-06 | End: 2025-07-06

## 2025-06-06 RX ORDER — CLOBETASOL PROPIONATE 0.5 MG/G
OINTMENT TOPICAL
Qty: 60 G | Refills: 1 | Status: SHIPPED | OUTPATIENT
Start: 2025-06-06

## 2025-06-06 ASSESSMENT — FIBROSIS 4 INDEX: FIB4 SCORE: 1.91

## 2025-06-06 NOTE — PROGRESS NOTES
Subjective:     Jamal Dee is a 74 y.o. male who presents for Hospital Follow-up.    Transitional Care Management  TCM Outreach Date and Time: Filed (6/2/2025 10:33 AM)    Discharge Questions  Actual Discharge Date: 06/01/25  Now that you are home, how are you feeling?: Good (Pt reports being sore, but oxycodone helps; took one this morning.)  Did you receive any new prescriptions?: Yes (Start: Augmentin, Zofran, Roxicodone     Stop: Kenalog)  Were you able to get them filled?: Yes  Meds to Bed or Pharmacy filled?: Meds to Bed  Do you have any questions about your current medications or new medications (Review Med Rec)?: No  Did you have any durable medical equipment ordered?: No  Do you have a follow up appointment scheduled with your PCP?: Yes  Appointment Date: 06/06/25  Appointment Time: 0920  Any issues or paperwork you wish to discuss with your PCP?: Yes (Please specify) (Hosptial stay)  Are you (patient) able to get to the appointment?: Yes  If Home Health was ordered, have they contacted you (Patient): Not Applicable  Did you have enough support after your last discharge?: Yes  Does this patient qualify for the CCM program?: Yes (Refer pt to Marlen Abad)    Transitional Care  Number of attempts made to contact patient: 1  Current or previous attempts completed within two business days of discharge? : Yes  Provided education regarding treatment plan, medications, self-management, ADLs?: Yes (ED precautions reviewed; if pain become unmanagable with Oxycodone pt should return to ED)  Has patient completed an Advanced Directive?: No  Is there anything else I can help you with?: No    Discharge Summary  Chief Complaint: Chest Pain  Admitting Diagnosis: Acute gallstone pancreatitis  Discharge Diagnosis: Choledocholithiasis        HPI:     Patient is a 73 yo male with hyperlipidemia, sleep apnea, prediabetes, BPH who was admitted to the hospital on 5/28/2025 for acute development of abdominal pain,  nausea, vomiting.  He was found to have acute pancreatitis, acute hepatitis secondary to cholelithiasis and biliary obstruction.  Patient underwent ERCP with distal common bile duct stone extraction on 5/30/2025.  He was started on IV antibiotic due to the present purulent material after ERCP. patient did not have any systemic symptoms.  On 5/31/2025, patient underwent cholecystectomy.  Postop course is uncomplicated.  Patient was discharged with Augmentin which she has completed.  He continues to do well after discharge.  Abdominal wound is healing appropriately.  Denies any fever, chills, nausea, vomiting, hematochezia, melena.  Patient no longer requires opioid for pain control.  He takes Tylenol or ibuprofen as needed.    Patient used to drink alcohol, 1 glasses of wine nightly.  Patient has stopped doing so since discharge.    He continues to take the rest of his medication as directed.  He had appointment for postop follow-up with his surgeon next week.  He still has intermittent nausea that is controlled with Zofran.    Current medicines (including reconciliation performed today)  Current Outpatient Medications   Medication Sig Dispense Refill    Iron-Vitamin C  MG Tab Take 1 Tablet by mouth every day. 90 Tablet 0    ondansetron (ZOFRAN ODT) 4 MG TABLET DISPERSIBLE Take 1 Tablet by mouth every 8 hours as needed for Nausea/Vomiting for up to 30 days. 30 Tablet 0    clobetasol (TEMOVATE) 0.05 % Ointment Apply to affected areas twice daily until symptoms resolve. 60 g 1    atorvastatin (LIPITOR) 40 MG Tab TAKE ONE TABLET BY MOUTH AT BEDTIME 100 Tablet 3    dutasteride (AVODART) 0.5 MG capsule TAKE ONE CAPSULE BY MOUTH ONE TIME DAILY 100 Capsule 3    metFORMIN ER (GLUCOPHAGE XR) 500 MG TABLET SR 24 HR Take 1 Tablet by mouth 2 times a day. 200 Tablet 0    tamsulosin (FLOMAX) 0.4 MG capsule Take 1 Capsule by mouth every day. 100 Capsule 3    famciclovir (FAMVIR) 125 MG Tab Take 1 Tablet by mouth 3 times a day  "as needed (recurrent herpes outbreaks). 60 Tablet 1    Omega-3 Fatty Acids (FISH OIL) 1000 MG Cap capsule Take 1,000 mg by mouth every day.      timolol (TIMOPTIC) 0.5 % Solution Administer 1 Drop into both eyes every morning.  6    bimatoprost (LUMIGAN) 0.01 % Solution Administer 1 Drop into both eyes at bedtime.       No current facility-administered medications for this visit.       Allergies:   Other environmental    Social History     Tobacco Use    Smoking status: Former     Current packs/day: 0.00     Average packs/day: 1.5 packs/day for 15.0 years (22.5 ttl pk-yrs)     Types: Cigarettes     Start date: 1969     Quit date: 1984     Years since quittin.3    Smokeless tobacco: Never    Tobacco comments:     quit    Vaping Use    Vaping status: Never Used   Substance Use Topics    Alcohol use: Yes     Alcohol/week: 4.2 oz     Types: 7 Glasses of wine per week     Comment: 1 glass a night    Drug use: No         Objective:     Vitals:    25 0901   BP: 122/60   BP Location: Left arm   Patient Position: Sitting   BP Cuff Size: Adult   Pulse: (!) 54   Resp: 14   Temp: 36.4 °C (97.6 °F)   TempSrc: Temporal   SpO2: 97%   Weight: 73 kg (160 lb 15 oz)   Height: 1.702 m (5' 7\")     Body mass index is 25.21 kg/m².    Physical Exam:  Physical Exam  Constitutional:       Appearance: Normal appearance.   Cardiovascular:      Rate and Rhythm: Normal rate and regular rhythm.      Pulses: Normal pulses.      Heart sounds: Normal heart sounds. No murmur heard.     No friction rub. No gallop.   Pulmonary:      Effort: Pulmonary effort is normal. No respiratory distress.      Breath sounds: Normal breath sounds. No stridor. No wheezing, rhonchi or rales.   Chest:      Chest wall: No tenderness.   Abdominal:      General: Abdomen is flat. Bowel sounds are normal.      Palpations: Abdomen is soft.      Comments: Abdominal wounds are healing appropriately.  Negative bleeding or discharge.   Skin:     General: " Skin is warm and dry.   Neurological:      Mental Status: He is alert.       Assessment and Plan:     1. Hospital discharge follow-up (Primary)  2. Choledocholithiasis  3. Cholangitis  4. Calculus of gallbladder with acute cholecystitis and obstruction  5. Elevated liver enzymes  6. Gallstone pancreatitis  7. Hypokalemia  8. Leukocytosis, unspecified type  Patient was admitted to the hospital on 5/28/2025 for acute pancreatitis, acute hepatitis, acute cholecystitis secondary to common bile duct obstruction by gallstone.  Patient underwent successful ERCP and cholecystectomy.  He was on IV antibiotics during hospital admission.  He was discharged with oral Augmentin which he completed.  He continues to do well after discharge.  The wounds are healing appropriately.  Pain is now controlled with Tylenol.  He still has intermittent nausea which is controlled with Zofran.  - LIPASE; Future  - CBC, CMP  - ondansetron (ZOFRAN ODT) 4 MG TABLET DISPERSIBLE; Take 1 Tablet by mouth every 8 hours as needed for Nausea/Vomiting for up to 30 days.  Dispense: 30 Tablet; Refill: 0  - Comp Metabolic Panel; Future  - CBC WITH DIFFERENTIAL; Future  - Continue Tylenol as needed for pain  - Activity modification  - Dietary modification  - Keep appointment to follow-up with surgery next week  - ER precautions discussed.    9. Normochromic normocytic anemia  10. Low serum iron  Patient was found to have mild normocytic anemia after the surgery.  Iron studies showed low serum iron concerning for iron deficiency anemia secondary to blood loss during the surgery.  Patient is asymptomatic.  He does not have any dietary restrictions.  - CBC WITH DIFFERENTIAL; Future  - VITAMIN B12; Future  - TSH WITH REFLEX TO FT4; Future  - IRON/TOTAL IRON BIND; Future  - FERRITIN; Future  - FOLATE; Future  - Iron-Vitamin C  MG Tab; Take 1 Tablet by mouth every day.  Dispense: 90 Tablet; Refill: 0  - follow up in 3 months.  ER precautions  discussed.    11. Hand dermatitis  - clobetasol (TEMOVATE) 0.05 % Ointment; Apply to affected areas twice daily until symptoms resolve.  Dispense: 60 g; Refill: 1     - Chart and discharge summary were reviewed.   - Hospitalization and results reviewed with patient.   - Medications reviewed including instructions regarding high risk medications, dosing and side effects.  - Recommended Services: No services needed at this time  - Advance directive/POLST on file?  No     Follow-up:Return in about 3 months (around 9/6/2025) for Multiple issues.    Face-to-face transitional care management services with HIGH (today's visit is within days post discharge & LACE+ score 59+) medical decision complexity were provided.

## 2025-07-15 ENCOUNTER — HOSPITAL ENCOUNTER (OUTPATIENT)
Dept: LAB | Facility: MEDICAL CENTER | Age: 75
End: 2025-07-15
Attending: FAMILY MEDICINE
Payer: MEDICARE

## 2025-07-15 DIAGNOSIS — E87.6 HYPOKALEMIA: ICD-10-CM

## 2025-07-15 DIAGNOSIS — D72.829 LEUKOCYTOSIS, UNSPECIFIED TYPE: ICD-10-CM

## 2025-07-15 DIAGNOSIS — K85.10 GALLSTONE PANCREATITIS: ICD-10-CM

## 2025-07-15 DIAGNOSIS — D64.9 NORMOCHROMIC NORMOCYTIC ANEMIA: ICD-10-CM

## 2025-07-15 DIAGNOSIS — E61.1 LOW SERUM IRON: ICD-10-CM

## 2025-07-15 LAB
ALBUMIN SERPL BCP-MCNC: 3.9 G/DL (ref 3.2–4.9)
ALBUMIN/GLOB SERPL: 1.4 G/DL
ALP SERPL-CCNC: 84 U/L (ref 30–99)
ALT SERPL-CCNC: 23 U/L (ref 2–50)
ANION GAP SERPL CALC-SCNC: 9 MMOL/L (ref 7–16)
AST SERPL-CCNC: 23 U/L (ref 12–45)
BASOPHILS # BLD AUTO: 0.9 % (ref 0–1.8)
BASOPHILS # BLD: 0.05 K/UL (ref 0–0.12)
BILIRUB SERPL-MCNC: 0.5 MG/DL (ref 0.1–1.5)
BUN SERPL-MCNC: 14 MG/DL (ref 8–22)
CALCIUM ALBUM COR SERPL-MCNC: 9 MG/DL (ref 8.5–10.5)
CALCIUM SERPL-MCNC: 8.9 MG/DL (ref 8.5–10.5)
CHLORIDE SERPL-SCNC: 111 MMOL/L (ref 96–112)
CO2 SERPL-SCNC: 25 MMOL/L (ref 20–33)
CREAT SERPL-MCNC: 0.78 MG/DL (ref 0.5–1.4)
EOSINOPHIL # BLD AUTO: 0.19 K/UL (ref 0–0.51)
EOSINOPHIL NFR BLD: 3.5 % (ref 0–6.9)
ERYTHROCYTE [DISTWIDTH] IN BLOOD BY AUTOMATED COUNT: 52.6 FL (ref 35.9–50)
FERRITIN SERPL-MCNC: 163 NG/ML (ref 22–322)
FOLATE SERPL-MCNC: 12.9 NG/ML
GFR SERPLBLD CREATININE-BSD FMLA CKD-EPI: 93 ML/MIN/1.73 M 2
GLOBULIN SER CALC-MCNC: 2.7 G/DL (ref 1.9–3.5)
GLUCOSE SERPL-MCNC: 96 MG/DL (ref 65–99)
HCT VFR BLD AUTO: 41.6 % (ref 42–52)
HGB BLD-MCNC: 13.2 G/DL (ref 14–18)
IMM GRANULOCYTES # BLD AUTO: 0.03 K/UL (ref 0–0.11)
IMM GRANULOCYTES NFR BLD AUTO: 0.6 % (ref 0–0.9)
IRON SATN MFR SERPL: 27 % (ref 15–55)
IRON SERPL-MCNC: 74 UG/DL (ref 50–180)
LIPASE SERPL-CCNC: 83 U/L (ref 11–82)
LYMPHOCYTES # BLD AUTO: 1.09 K/UL (ref 1–4.8)
LYMPHOCYTES NFR BLD: 20.2 % (ref 22–41)
MCH RBC QN AUTO: 29.7 PG (ref 27–33)
MCHC RBC AUTO-ENTMCNC: 31.7 G/DL (ref 32.3–36.5)
MCV RBC AUTO: 93.5 FL (ref 81.4–97.8)
MONOCYTES # BLD AUTO: 0.36 K/UL (ref 0–0.85)
MONOCYTES NFR BLD AUTO: 6.7 % (ref 0–13.4)
NEUTROPHILS # BLD AUTO: 3.68 K/UL (ref 1.82–7.42)
NEUTROPHILS NFR BLD: 68.1 % (ref 44–72)
NRBC # BLD AUTO: 0 K/UL
NRBC BLD-RTO: 0 /100 WBC (ref 0–0.2)
PLATELET # BLD AUTO: 186 K/UL (ref 164–446)
PMV BLD AUTO: 10.5 FL (ref 9–12.9)
POTASSIUM SERPL-SCNC: 4.1 MMOL/L (ref 3.6–5.5)
PROT SERPL-MCNC: 6.6 G/DL (ref 6–8.2)
RBC # BLD AUTO: 4.45 M/UL (ref 4.7–6.1)
SODIUM SERPL-SCNC: 145 MMOL/L (ref 135–145)
TIBC SERPL-MCNC: 271 UG/DL (ref 250–450)
TSH SERPL DL<=0.005 MIU/L-ACNC: 2.39 UIU/ML (ref 0.38–5.33)
UIBC SERPL-MCNC: 197 UG/DL (ref 110–370)
VIT B12 SERPL-MCNC: 891 PG/ML (ref 211–911)
WBC # BLD AUTO: 5.4 K/UL (ref 4.8–10.8)

## 2025-07-15 PROCEDURE — 83690 ASSAY OF LIPASE: CPT

## 2025-07-15 PROCEDURE — 83550 IRON BINDING TEST: CPT

## 2025-07-15 PROCEDURE — 83540 ASSAY OF IRON: CPT

## 2025-07-15 PROCEDURE — 82728 ASSAY OF FERRITIN: CPT

## 2025-07-15 PROCEDURE — 80053 COMPREHEN METABOLIC PANEL: CPT

## 2025-07-15 PROCEDURE — 82607 VITAMIN B-12: CPT

## 2025-07-15 PROCEDURE — 84443 ASSAY THYROID STIM HORMONE: CPT

## 2025-07-15 PROCEDURE — 36415 COLL VENOUS BLD VENIPUNCTURE: CPT

## 2025-07-15 PROCEDURE — 82746 ASSAY OF FOLIC ACID SERUM: CPT

## 2025-07-15 PROCEDURE — 85025 COMPLETE CBC W/AUTO DIFF WBC: CPT

## 2025-07-17 ENCOUNTER — RESULTS FOLLOW-UP (OUTPATIENT)
Dept: MEDICAL GROUP | Facility: MEDICAL CENTER | Age: 75
End: 2025-07-17
Payer: MEDICARE

## 2025-07-29 ENCOUNTER — OFFICE VISIT (OUTPATIENT)
Dept: MEDICAL GROUP | Facility: MEDICAL CENTER | Age: 75
End: 2025-07-29
Payer: MEDICARE

## 2025-07-29 ENCOUNTER — HOSPITAL ENCOUNTER (OUTPATIENT)
Dept: RADIOLOGY | Facility: MEDICAL CENTER | Age: 75
End: 2025-07-29
Attending: FAMILY MEDICINE
Payer: MEDICARE

## 2025-07-29 DIAGNOSIS — R22.2 MASS OF CHEST WALL, RIGHT: ICD-10-CM

## 2025-07-29 PROBLEM — Z90.49 HISTORY OF CHOLECYSTECTOMY: Status: ACTIVE | Noted: 2025-06-10

## 2025-07-29 PROCEDURE — 71046 X-RAY EXAM CHEST 2 VIEWS: CPT

## 2025-07-29 ASSESSMENT — FIBROSIS 4 INDEX: FIB4 SCORE: 1.93

## 2025-08-03 ENCOUNTER — RESULTS FOLLOW-UP (OUTPATIENT)
Dept: MEDICAL GROUP | Facility: MEDICAL CENTER | Age: 75
End: 2025-08-03
Payer: MEDICARE

## (undated) DEVICE — FORCEP RADIAL JAW 4 STANDARD CAPACITY W/NEEDLE 240CM (40EA/BX)

## (undated) DEVICE — SUTURE 4-0 MONOCRYL PLUS PS-2 - 27 INCH (36/BX)

## (undated) DEVICE — TUBING CLEARLINK DUO-VENT - C-FLO (48EA/CA)

## (undated) DEVICE — SENSOR OXIMETER ADULT SPO2 RD SET (20EA/BX)

## (undated) DEVICE — CHLORAPREP 26 ML APPLICATOR - ORANGE TINT(25/CA)

## (undated) DEVICE — OBTURATOR BLADELESS STANDARD 8MM (6EA/BX)

## (undated) DEVICE — GOWN SURGEONS LARGE - (32/CA)

## (undated) DEVICE — BALLOON RETRIEVAL EXTRACTOR PRO RX 9-12MM

## (undated) DEVICE — GLOVE BIOGEL SZ 7 SURGICAL PF LTX - (50PR/BX 4BX/CA)

## (undated) DEVICE — BLADE SURGICAL #11 - (50/BX)

## (undated) DEVICE — DRAPE IOBAN II INCISE 23X17 - (10EA/BX 4BX/CA)

## (undated) DEVICE — TUBE SUCTION YANKAUER 1/4 X 6FT (50EA/CA)"

## (undated) DEVICE — DRAPE COLUMN BOX OF 20

## (undated) DEVICE — CLIP APPLIER LARGE DA VINCI 100X'S REUSABLE

## (undated) DEVICE — BOVIE BLADE COATED &INSULATED - 25/PK

## (undated) DEVICE — SET LEADWIRE 5 LEAD BEDSIDE DISPOSABLE ECG (1SET OF 5/EA)

## (undated) DEVICE — GLOVE CHEMO/AUTOPSY MEDIUM - POWDER FREE (50/BX)

## (undated) DEVICE — SPHINCTEROTOME CLEVER CUT

## (undated) DEVICE — LACTATED RINGERS INJ 1000 ML - (14EA/CA 60CA/PF)

## (undated) DEVICE — TOWEL STOP TIMEOUT SAFETY FLAG (40EA/CA)

## (undated) DEVICE — SYRINGE DISP. 60 CC LL - (30/BX, 12BX/CA)**WHEN THESE ARE GONE ORDER #500206**

## (undated) DEVICE — SET EXTENSION WITH 2 PORTS (48EA/CA) ***PART #2C8610 IS A SUBSTITUTE*****

## (undated) DEVICE — SUTURE GENERAL

## (undated) DEVICE — NEEDLE INSFL 120MM 14GA VRRS - (20/BX)

## (undated) DEVICE — SYSTEM CLEARIFY VISUALIZATION (10EA/PK)

## (undated) DEVICE — CANNULA O2 COMFORT SOFT EAR ADULT 7 FT TUBING (50/CA)

## (undated) DEVICE — BASIN EMESIS DISP. - (250/CA)

## (undated) DEVICE — SLEEVE, VASO, THIGH, MED

## (undated) DEVICE — COVER MAYO STAND X-LG - (22EA/CA)

## (undated) DEVICE — BLOCK BITE ENDOSCOPIC 2809 - (100/BX) INTERMEDIATE

## (undated) DEVICE — SPONGE GAUZE NON-STERILE 4X4 - (2000/CA 10PK/CA)

## (undated) DEVICE — GUIDE JAGWIRE 035 STRAIGHT (2EA/BX)

## (undated) DEVICE — KIT CUSTOM PROCEDURE SINGLE FOR ENDO (15/CA)

## (undated) DEVICE — BAG RETRIEVAL 5MM (10EA/BX)

## (undated) DEVICE — DERMABOND ADVANCED - (12EA/BX)

## (undated) DEVICE — SUTURE 0 VICRYL PLUS UR-6 - 27 INCH (36/BX)

## (undated) DEVICE — GLOVE BIOGEL PI INDICATOR SZ 6.5 SURGICAL PF LF - (50/BX 4BX/CA)

## (undated) DEVICE — SEAL UNIVERSAL 5MM-12MM (10EA/BX)

## (undated) DEVICE — DRAPE ARM BOX OF 20

## (undated) DEVICE — ELECTRODE DUAL RETURN W/ CORD - (50/PK)

## (undated) DEVICE — GLOVE SZ 7.5 BIOGEL PI MICRO - PF LF (50PR/BX)

## (undated) DEVICE — CLIP HEMOLOCK PURPLE - (14/BX)

## (undated) DEVICE — CANISTER SUCTION 3000ML MECHANICAL FILTER AUTO SHUTOFF MEDI-VAC NONSTERILE LF DISP (40EA/CA)

## (undated) DEVICE — MASK WITH FACE SHIELD (25/BX 4BX/CA)

## (undated) DEVICE — GOWN WARMING STANDARD FLEX - (30/CA)

## (undated) DEVICE — WATER IRRIGATION STERILE 1000ML (12EA/CA)